# Patient Record
Sex: FEMALE | Race: WHITE | NOT HISPANIC OR LATINO | Employment: OTHER | URBAN - METROPOLITAN AREA
[De-identification: names, ages, dates, MRNs, and addresses within clinical notes are randomized per-mention and may not be internally consistent; named-entity substitution may affect disease eponyms.]

---
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
## 2023-08-31 PROBLEM — I51.89 IMPAIRED CARDIAC FUNCTION: Status: ACTIVE | Noted: 2023-08-31

## 2023-08-31 PROBLEM — R55 NEAR SYNCOPE: Status: ACTIVE | Noted: 2023-08-31

## 2023-08-31 PROBLEM — F03.90 DEMENTIA (MULTI): Status: ACTIVE | Noted: 2023-08-31

## 2023-08-31 PROBLEM — S09.90XA INJURY OF HEAD: Status: ACTIVE | Noted: 2023-08-31

## 2023-08-31 PROBLEM — I25.10 CORONARY ARTERY DISEASE: Status: ACTIVE | Noted: 2023-08-31

## 2023-08-31 PROBLEM — I10 ESSENTIAL HYPERTENSION: Status: ACTIVE | Noted: 2023-08-31

## 2023-08-31 PROBLEM — I10 HYPERTENSIVE DISORDER: Status: ACTIVE | Noted: 2023-08-31

## 2023-08-31 PROBLEM — R40.1 CLOUDED CONSCIOUSNESS: Status: ACTIVE | Noted: 2023-08-31

## 2023-08-31 PROBLEM — R53.1 ASTHENIA: Status: ACTIVE | Noted: 2023-08-31

## 2023-08-31 PROBLEM — R41.0 DELIRIUM: Status: ACTIVE | Noted: 2023-08-31

## 2023-08-31 PROBLEM — S01.511A LACERATION OF LOWER LIP: Status: ACTIVE | Noted: 2023-08-31

## 2023-08-31 PROBLEM — F41.9 ANXIETY: Status: ACTIVE | Noted: 2023-08-31

## 2023-08-31 PROBLEM — I49.9 CARDIAC RHYTHM DISORDER OR DISTURBANCE OR CHANGE: Status: ACTIVE | Noted: 2023-08-31

## 2023-08-31 PROBLEM — S49.90XA INJURY OF UPPER EXTREMITY: Status: ACTIVE | Noted: 2023-08-31

## 2023-08-31 PROBLEM — R42 LIGHTHEADEDNESS: Status: ACTIVE | Noted: 2023-08-31

## 2023-08-31 PROBLEM — S00.83XA CONTUSION OF FACE: Status: ACTIVE | Noted: 2023-08-31

## 2023-08-31 PROBLEM — R07.9 CHEST PAIN: Status: ACTIVE | Noted: 2023-08-31

## 2023-08-31 RX ORDER — METOPROLOL TARTRATE 25 MG/1
25 TABLET, FILM COATED ORAL DAILY
COMMUNITY

## 2023-08-31 RX ORDER — ATORVASTATIN CALCIUM 80 MG/1
1 TABLET, FILM COATED ORAL NIGHTLY
COMMUNITY

## 2023-08-31 RX ORDER — NAPROXEN SODIUM 220 MG/1
1 TABLET, FILM COATED ORAL 2 TIMES DAILY
COMMUNITY

## 2023-08-31 RX ORDER — LOSARTAN POTASSIUM 25 MG/1
1 TABLET ORAL DAILY
COMMUNITY

## 2023-10-10 ENCOUNTER — OFFICE VISIT (OUTPATIENT)
Dept: CARDIOLOGY | Facility: CLINIC | Age: 83
End: 2023-10-10
Payer: MEDICARE

## 2023-10-10 VITALS
HEIGHT: 62 IN | SYSTOLIC BLOOD PRESSURE: 148 MMHG | BODY MASS INDEX: 27.23 KG/M2 | HEART RATE: 57 BPM | TEMPERATURE: 98.6 F | DIASTOLIC BLOOD PRESSURE: 74 MMHG | WEIGHT: 148 LBS | RESPIRATION RATE: 18 BRPM | OXYGEN SATURATION: 98 %

## 2023-10-10 DIAGNOSIS — I25.118 CORONARY ARTERY DISEASE OF NATIVE ARTERY OF NATIVE HEART WITH STABLE ANGINA PECTORIS (CMS-HCC): Primary | ICD-10-CM

## 2023-10-10 DIAGNOSIS — I25.2 MI, OLD: ICD-10-CM

## 2023-10-10 DIAGNOSIS — I10 ESSENTIAL HYPERTENSION: ICD-10-CM

## 2023-10-10 PROCEDURE — 93000 ELECTROCARDIOGRAM COMPLETE: CPT | Performed by: INTERNAL MEDICINE

## 2023-10-10 PROCEDURE — 99213 OFFICE O/P EST LOW 20 MIN: CPT | Performed by: INTERNAL MEDICINE

## 2023-10-10 PROCEDURE — 1159F MED LIST DOCD IN RCRD: CPT | Performed by: INTERNAL MEDICINE

## 2023-10-10 PROCEDURE — 1126F AMNT PAIN NOTED NONE PRSNT: CPT | Performed by: INTERNAL MEDICINE

## 2023-10-10 PROCEDURE — 3078F DIAST BP <80 MM HG: CPT | Performed by: INTERNAL MEDICINE

## 2023-10-10 PROCEDURE — 3077F SYST BP >= 140 MM HG: CPT | Performed by: INTERNAL MEDICINE

## 2023-10-10 RX ORDER — MEMANTINE HYDROCHLORIDE 10 MG/1
5 TABLET ORAL DAILY
COMMUNITY
Start: 2023-08-12

## 2023-10-10 RX ORDER — DILTIAZEM HYDROCHLORIDE 240 MG/1
240 CAPSULE, COATED, EXTENDED RELEASE ORAL DAILY
COMMUNITY
Start: 2023-08-23 | End: 2023-12-22 | Stop reason: HOSPADM

## 2023-10-10 RX ORDER — DONEPEZIL HYDROCHLORIDE 10 MG/1
10 TABLET, FILM COATED ORAL NIGHTLY
COMMUNITY
Start: 2023-08-01

## 2023-10-10 ASSESSMENT — PATIENT HEALTH QUESTIONNAIRE - PHQ9
SUM OF ALL RESPONSES TO PHQ9 QUESTIONS 1 AND 2: 0
2. FEELING DOWN, DEPRESSED OR HOPELESS: NOT AT ALL
1. LITTLE INTEREST OR PLEASURE IN DOING THINGS: NOT AT ALL

## 2023-10-10 ASSESSMENT — ENCOUNTER SYMPTOMS
DEPRESSION: 0
OCCASIONAL FEELINGS OF UNSTEADINESS: 1
LOSS OF SENSATION IN FEET: 0

## 2023-10-10 ASSESSMENT — PAIN SCALES - GENERAL: PAINLEVEL: 0-NO PAIN

## 2023-10-10 NOTE — PROGRESS NOTES
"Subjective   Patient ID: Riya Hernandez is a 83 y.o. female who presents for Follow-up (Mrs\ Ms. Hernandez is present for 6 month  Follow up with Dr. Bright ).  HPI  83 the patient apparently is limited functionality underlying multiple cardiac risk factor, but condition  History of dementia, history of CAD, in the month of March of this year patient had a acute inferoposterior MI status post PCI of left circumflex artery was done.  Patient had a PCI of her mid circumflex with a drug-eluting stent so far stable currently patient on wheelchair-bound.  Moderate lesions in LAD could manage medically at the moment.  Patient currently on aspirin, Lipitor 80 mg, diltiazem 2040 mg once a day, losartan 25 mg tablet, metoprolol Lopressor 25 mg tablet, Brilinta 90 mg 1 tablet p.o. twice daily.  Patient denies any chest pain tightness limited functionality.  Currently with family.  Review of Systems  Unremarkable      4/12/2023    12:00 AM 10/10/2023     9:52 AM   Vitals   Systolic 124 148   Diastolic 66 74   Heart Rate 50 57   Temp  37 °C (98.6 °F)   Resp 18 18   Height (in) 1.575 m (5' 2\") 1.575 m (5' 2\")   Weight (lb) 143 148   BMI 26.16 kg/m2 27.07 kg/m2   BSA (m2) 1.68 m2 1.71 m2   Visit Report  Report     \  Objective   Physical Exam  General Cardiology:  General Appearance: Alert, oriented and in no acute distress.  HEENT: extra ocular movements intact (EOMI), pupils equal,  round, reactive to light and accommodation (PERRLA).  Carotid Upstroke: no bruit, normal.  Jugular Venous Distention (JVD): flat.  Chest: normal.  Lungs: Clear to auscultation,   Heart Sounds: no S3 or S4, normal S1, S2, regular rate.  Murmur, Click, Gallop: Soft systolic murmur.  Abdomen: no hepatomegaly, no masses felt, soft.  Extremities: Trace leg edema.  Peripheral pulses: 2 plus bilateral.  NEUROLOGY Cranial nerves II-XII grossly intact.    Assessment/Plan        Patient has above underlying multiple cardiac risk factors history of CAD, MI, " hypertension hyperlipidemia with dementia and limited functionality currently wheelchair-bound.  Continue current guideline directed medical therapy post MI  Continue aspirin, beta-blocker, ACE inhibitor, dual antiplatelet therapy 1 year post PCI.  EKG shows sinus bradycardia 55 with a nonspecific ST-T changes  Advised patient for CHF diet to avoid SALTY 6/equals 1,000MG per DAY, Advised patient to watch out for diarrhea, dehydration and dizziness with CHF care plan. Advised patient for CHF diet to avoid SALTY 6/equals 1,000MG per DAY, Guideline directed medical therapy for CHF and CMP.

## 2023-12-19 ENCOUNTER — APPOINTMENT (OUTPATIENT)
Dept: RADIOLOGY | Facility: HOSPITAL | Age: 83
DRG: 554 | End: 2023-12-19
Payer: MEDICARE

## 2023-12-19 ENCOUNTER — HOSPITAL ENCOUNTER (INPATIENT)
Facility: HOSPITAL | Age: 83
LOS: 3 days | Discharge: NURSING, REHAB OR LONG-TERM CARE | DRG: 554 | End: 2023-12-22
Attending: EMERGENCY MEDICINE | Admitting: INTERNAL MEDICINE
Payer: MEDICARE

## 2023-12-19 DIAGNOSIS — M16.12 OSTEOARTHRITIS OF LEFT HIP, UNSPECIFIED OSTEOARTHRITIS TYPE: ICD-10-CM
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
## 2023-12-19 DIAGNOSIS — I10 ESSENTIAL HYPERTENSION: ICD-10-CM

## 2023-12-19 DIAGNOSIS — M25.552 LEFT HIP PAIN: ICD-10-CM

## 2023-12-19 DIAGNOSIS — R40.1 CLOUDED CONSCIOUSNESS: ICD-10-CM

## 2023-12-19 DIAGNOSIS — R26.2 INABILITY TO WALK: ICD-10-CM

## 2023-12-19 LAB
ALBUMIN SERPL-MCNC: 3.7 G/DL (ref 3.5–5)
ALP BLD-CCNC: 244 U/L (ref 35–125)
ALT SERPL-CCNC: 16 U/L (ref 5–40)
ANION GAP SERPL CALC-SCNC: 10 MMOL/L
APPEARANCE UR: ABNORMAL
AST SERPL-CCNC: 18 U/L (ref 5–40)
BASOPHILS # BLD AUTO: 0.03 X10*3/UL (ref 0–0.1)
BASOPHILS NFR BLD AUTO: 0.3 %
BILIRUB SERPL-MCNC: 0.3 MG/DL (ref 0.1–1.2)
BILIRUB UR STRIP.AUTO-MCNC: NEGATIVE MG/DL
BUN SERPL-MCNC: 20 MG/DL (ref 8–25)
CALCIUM SERPL-MCNC: 8.9 MG/DL (ref 8.5–10.4)
CHLORIDE SERPL-SCNC: 107 MMOL/L (ref 97–107)
CO2 SERPL-SCNC: 22 MMOL/L (ref 24–31)
COLOR UR: ABNORMAL
CREAT SERPL-MCNC: 1.1 MG/DL (ref 0.4–1.6)
EOSINOPHIL # BLD AUTO: 0.13 X10*3/UL (ref 0–0.4)
EOSINOPHIL NFR BLD AUTO: 1.3 %
ERYTHROCYTE [DISTWIDTH] IN BLOOD BY AUTOMATED COUNT: 17.2 % (ref 11.5–14.5)
FLUAV RNA RESP QL NAA+PROBE: NOT DETECTED
FLUBV RNA RESP QL NAA+PROBE: NOT DETECTED
GFR SERPL CREATININE-BSD FRML MDRD: 49 ML/MIN/1.73M*2
GLUCOSE SERPL-MCNC: 91 MG/DL (ref 65–99)
GLUCOSE UR STRIP.AUTO-MCNC: NORMAL MG/DL
HCT VFR BLD AUTO: 34.7 % (ref 36–46)
HGB BLD-MCNC: 11.1 G/DL (ref 12–16)
IMM GRANULOCYTES # BLD AUTO: 0.04 X10*3/UL (ref 0–0.5)
IMM GRANULOCYTES NFR BLD AUTO: 0.4 % (ref 0–0.9)
KETONES UR STRIP.AUTO-MCNC: NEGATIVE MG/DL
LEUKOCYTE ESTERASE UR QL STRIP.AUTO: NEGATIVE
LYMPHOCYTES # BLD AUTO: 1.4 X10*3/UL (ref 0.8–3)
LYMPHOCYTES NFR BLD AUTO: 14.5 %
MCH RBC QN AUTO: 27.5 PG (ref 26–34)
MCHC RBC AUTO-ENTMCNC: 32 G/DL (ref 32–36)
MCV RBC AUTO: 86 FL (ref 80–100)
MONOCYTES # BLD AUTO: 0.82 X10*3/UL (ref 0.05–0.8)
MONOCYTES NFR BLD AUTO: 8.5 %
NEUTROPHILS # BLD AUTO: 7.26 X10*3/UL (ref 1.6–5.5)
NEUTROPHILS NFR BLD AUTO: 75 %
NITRITE UR QL STRIP.AUTO: NEGATIVE
NRBC BLD-RTO: 0 /100 WBCS (ref 0–0)
PH UR STRIP.AUTO: 5.5 [PH]
PLATELET # BLD AUTO: 344 X10*3/UL (ref 150–450)
POTASSIUM SERPL-SCNC: 4.2 MMOL/L (ref 3.4–5.1)
PROT SERPL-MCNC: 7.1 G/DL (ref 5.9–7.9)
PROT UR STRIP.AUTO-MCNC: NEGATIVE MG/DL
RBC # BLD AUTO: 4.03 X10*6/UL (ref 4–5.2)
RBC # UR STRIP.AUTO: NEGATIVE /UL
SARS-COV-2 RNA RESP QL NAA+PROBE: NOT DETECTED
SODIUM SERPL-SCNC: 139 MMOL/L (ref 133–145)
SP GR UR STRIP.AUTO: 1.01
UROBILINOGEN UR STRIP.AUTO-MCNC: NORMAL MG/DL
WBC # BLD AUTO: 9.7 X10*3/UL (ref 4.4–11.3)

## 2023-12-19 PROCEDURE — 93010 ELECTROCARDIOGRAM REPORT: CPT | Performed by: INTERNAL MEDICINE

## 2023-12-19 PROCEDURE — 96372 THER/PROPH/DIAG INJ SC/IM: CPT | Performed by: INTERNAL MEDICINE

## 2023-12-19 PROCEDURE — 2500000001 HC RX 250 WO HCPCS SELF ADMINISTERED DRUGS (ALT 637 FOR MEDICARE OP): Performed by: CLINICAL NURSE SPECIALIST

## 2023-12-19 PROCEDURE — 1100000001 HC PRIVATE ROOM DAILY

## 2023-12-19 PROCEDURE — 85025 COMPLETE CBC W/AUTO DIFF WBC: CPT | Performed by: CLINICAL NURSE SPECIALIST

## 2023-12-19 PROCEDURE — 99285 EMERGENCY DEPT VISIT HI MDM: CPT | Performed by: EMERGENCY MEDICINE

## 2023-12-19 PROCEDURE — 2500000004 HC RX 250 GENERAL PHARMACY W/ HCPCS (ALT 636 FOR OP/ED): Performed by: INTERNAL MEDICINE

## 2023-12-19 PROCEDURE — 73700 CT LOWER EXTREMITY W/O DYE: CPT | Mod: LT

## 2023-12-19 PROCEDURE — 71046 X-RAY EXAM CHEST 2 VIEWS: CPT | Mod: FY

## 2023-12-19 PROCEDURE — 36415 COLL VENOUS BLD VENIPUNCTURE: CPT | Performed by: CLINICAL NURSE SPECIALIST

## 2023-12-19 PROCEDURE — 80053 COMPREHEN METABOLIC PANEL: CPT | Performed by: CLINICAL NURSE SPECIALIST

## 2023-12-19 PROCEDURE — 87636 SARSCOV2 & INF A&B AMP PRB: CPT | Performed by: CLINICAL NURSE SPECIALIST

## 2023-12-19 PROCEDURE — 73502 X-RAY EXAM HIP UNI 2-3 VIEWS: CPT | Mod: LT,FY

## 2023-12-19 PROCEDURE — 81003 URINALYSIS AUTO W/O SCOPE: CPT | Performed by: CLINICAL NURSE SPECIALIST

## 2023-12-19 RX ORDER — DILTIAZEM HYDROCHLORIDE 180 MG/1
180 CAPSULE, COATED, EXTENDED RELEASE ORAL DAILY
Status: DISCONTINUED | OUTPATIENT
Start: 2023-12-19 | End: 2023-12-22 | Stop reason: HOSPADM

## 2023-12-19 RX ORDER — HYDROCODONE BITARTRATE AND ACETAMINOPHEN 5; 325 MG/1; MG/1
1 TABLET ORAL EVERY 6 HOURS PRN
Status: DISCONTINUED | OUTPATIENT
Start: 2023-12-19 | End: 2023-12-22 | Stop reason: HOSPADM

## 2023-12-19 RX ORDER — NAPROXEN SODIUM 220 MG/1
81 TABLET, FILM COATED ORAL DAILY
Status: DISCONTINUED | OUTPATIENT
Start: 2023-12-19 | End: 2023-12-19

## 2023-12-19 RX ORDER — ACETAMINOPHEN 325 MG/1
650 TABLET ORAL EVERY 4 HOURS PRN
Status: DISCONTINUED | OUTPATIENT
Start: 2023-12-19 | End: 2023-12-22 | Stop reason: HOSPADM

## 2023-12-19 RX ORDER — DONEPEZIL HYDROCHLORIDE 10 MG/1
10 TABLET, FILM COATED ORAL NIGHTLY
Status: DISCONTINUED | OUTPATIENT
Start: 2023-12-19 | End: 2023-12-22 | Stop reason: HOSPADM

## 2023-12-19 RX ORDER — ATORVASTATIN CALCIUM 80 MG/1
80 TABLET, FILM COATED ORAL NIGHTLY
Status: DISCONTINUED | OUTPATIENT
Start: 2023-12-19 | End: 2023-12-22 | Stop reason: HOSPADM

## 2023-12-19 RX ORDER — PANTOPRAZOLE SODIUM 40 MG/1
40 TABLET, DELAYED RELEASE ORAL
Status: DISCONTINUED | OUTPATIENT
Start: 2023-12-20 | End: 2023-12-22 | Stop reason: HOSPADM

## 2023-12-19 RX ORDER — LORAZEPAM 0.5 MG/1
0.5 TABLET ORAL ONCE
Status: COMPLETED | OUTPATIENT
Start: 2023-12-19 | End: 2023-12-19

## 2023-12-19 RX ORDER — PANTOPRAZOLE SODIUM 40 MG/10ML
40 INJECTION, POWDER, LYOPHILIZED, FOR SOLUTION INTRAVENOUS
Status: DISCONTINUED | OUTPATIENT
Start: 2023-12-20 | End: 2023-12-22 | Stop reason: HOSPADM

## 2023-12-19 RX ORDER — NAPROXEN SODIUM 220 MG/1
81 TABLET, FILM COATED ORAL 2 TIMES DAILY
Status: DISCONTINUED | OUTPATIENT
Start: 2023-12-20 | End: 2023-12-22 | Stop reason: HOSPADM

## 2023-12-19 RX ORDER — ACETAMINOPHEN 160 MG/5ML
650 SOLUTION ORAL EVERY 4 HOURS PRN
Status: DISCONTINUED | OUTPATIENT
Start: 2023-12-19 | End: 2023-12-22 | Stop reason: HOSPADM

## 2023-12-19 RX ORDER — METOPROLOL TARTRATE 25 MG/1
25 TABLET, FILM COATED ORAL DAILY
Status: DISCONTINUED | OUTPATIENT
Start: 2023-12-19 | End: 2023-12-22 | Stop reason: HOSPADM

## 2023-12-19 RX ORDER — BISACODYL 5 MG
10 TABLET, DELAYED RELEASE (ENTERIC COATED) ORAL DAILY PRN
Status: DISCONTINUED | OUTPATIENT
Start: 2023-12-19 | End: 2023-12-22 | Stop reason: HOSPADM

## 2023-12-19 RX ORDER — LIDOCAINE 560 MG/1
1 PATCH PERCUTANEOUS; TOPICAL; TRANSDERMAL DAILY
Status: DISCONTINUED | OUTPATIENT
Start: 2023-12-19 | End: 2023-12-22 | Stop reason: HOSPADM

## 2023-12-19 RX ORDER — MEMANTINE HYDROCHLORIDE 5 MG/1
5 TABLET ORAL DAILY
Status: DISCONTINUED | OUTPATIENT
Start: 2023-12-19 | End: 2023-12-22 | Stop reason: HOSPADM

## 2023-12-19 RX ORDER — LOSARTAN POTASSIUM 25 MG/1
25 TABLET ORAL DAILY
Status: DISCONTINUED | OUTPATIENT
Start: 2023-12-19 | End: 2023-12-22 | Stop reason: HOSPADM

## 2023-12-19 RX ORDER — ACETAMINOPHEN 650 MG/1
650 SUPPOSITORY RECTAL EVERY 4 HOURS PRN
Status: DISCONTINUED | OUTPATIENT
Start: 2023-12-19 | End: 2023-12-22 | Stop reason: HOSPADM

## 2023-12-19 RX ORDER — ENOXAPARIN SODIUM 100 MG/ML
40 INJECTION SUBCUTANEOUS EVERY 24 HOURS
Status: DISCONTINUED | OUTPATIENT
Start: 2023-12-19 | End: 2023-12-22 | Stop reason: HOSPADM

## 2023-12-19 RX ADMIN — LORAZEPAM 0.5 MG: 0.5 TABLET ORAL at 16:45

## 2023-12-19 RX ADMIN — ENOXAPARIN SODIUM 40 MG: 40 INJECTION SUBCUTANEOUS at 20:59

## 2023-12-19 SDOH — SOCIAL STABILITY: SOCIAL INSECURITY: WERE YOU ABLE TO COMPLETE ALL THE BEHAVIORAL HEALTH SCREENINGS?: YES

## 2023-12-19 SDOH — SOCIAL STABILITY: SOCIAL INSECURITY: ARE THERE ANY APPARENT SIGNS OF INJURIES/BEHAVIORS THAT COULD BE RELATED TO ABUSE/NEGLECT?: NO

## 2023-12-19 SDOH — SOCIAL STABILITY: SOCIAL INSECURITY: HAVE YOU HAD THOUGHTS OF HARMING ANYONE ELSE?: NO

## 2023-12-19 SDOH — SOCIAL STABILITY: SOCIAL INSECURITY: HAS ANYONE EVER THREATENED TO HURT YOUR FAMILY OR YOUR PETS?: NO

## 2023-12-19 SDOH — SOCIAL STABILITY: SOCIAL INSECURITY: ARE YOU OR HAVE YOU BEEN THREATENED OR ABUSED PHYSICALLY, EMOTIONALLY, OR SEXUALLY BY ANYONE?: NO

## 2023-12-19 SDOH — SOCIAL STABILITY: SOCIAL INSECURITY: DO YOU FEEL UNSAFE GOING BACK TO THE PLACE WHERE YOU ARE LIVING?: NO

## 2023-12-19 SDOH — SOCIAL STABILITY: SOCIAL INSECURITY: DOES ANYONE TRY TO KEEP YOU FROM HAVING/CONTACTING OTHER FRIENDS OR DOING THINGS OUTSIDE YOUR HOME?: NO

## 2023-12-19 SDOH — SOCIAL STABILITY: SOCIAL INSECURITY: ABUSE: ADULT

## 2023-12-19 SDOH — SOCIAL STABILITY: SOCIAL INSECURITY: DO YOU FEEL ANYONE HAS EXPLOITED OR TAKEN ADVANTAGE OF YOU FINANCIALLY OR OF YOUR PERSONAL PROPERTY?: NO

## 2023-12-19 ASSESSMENT — PAIN SCALES - GENERAL
PAINLEVEL_OUTOF10: 0 - NO PAIN

## 2023-12-19 ASSESSMENT — ACTIVITIES OF DAILY LIVING (ADL)
ADEQUATE_TO_COMPLETE_ADL: YES
WALKS IN HOME: INDEPENDENT
BATHING: INDEPENDENT
FEEDING YOURSELF: INDEPENDENT
ASSISTIVE_DEVICE: WALKER
HEARING - RIGHT EAR: FUNCTIONAL
PATIENT'S MEMORY ADEQUATE TO SAFELY COMPLETE DAILY ACTIVITIES?: YES
DRESSING YOURSELF: INDEPENDENT
TOILETING: INDEPENDENT
HEARING - LEFT EAR: FUNCTIONAL
GROOMING: INDEPENDENT
LACK_OF_TRANSPORTATION: NO
JUDGMENT_ADEQUATE_SAFELY_COMPLETE_DAILY_ACTIVITIES: YES

## 2023-12-19 ASSESSMENT — LIFESTYLE VARIABLES
SKIP TO QUESTIONS 9-10: 1
AUDIT-C TOTAL SCORE: 0
HOW MANY STANDARD DRINKS CONTAINING ALCOHOL DO YOU HAVE ON A TYPICAL DAY: PATIENT DOES NOT DRINK
HOW OFTEN DO YOU HAVE 6 OR MORE DRINKS ON ONE OCCASION: NEVER
HOW OFTEN DO YOU HAVE A DRINK CONTAINING ALCOHOL: NEVER
AUDIT-C TOTAL SCORE: 0

## 2023-12-19 ASSESSMENT — COGNITIVE AND FUNCTIONAL STATUS - GENERAL
PATIENT BASELINE BEDBOUND: NO
STANDING UP FROM CHAIR USING ARMS: A LITTLE
MOBILITY SCORE: 23
WALKING IN HOSPITAL ROOM: A LITTLE
CLIMB 3 TO 5 STEPS WITH RAILING: A LITTLE
DRESSING REGULAR LOWER BODY CLOTHING: A LITTLE
TOILETING: A LITTLE
DAILY ACTIVITIY SCORE: 23

## 2023-12-19 ASSESSMENT — PATIENT HEALTH QUESTIONNAIRE - PHQ9
2. FEELING DOWN, DEPRESSED OR HOPELESS: NOT AT ALL
SUM OF ALL RESPONSES TO PHQ9 QUESTIONS 1 & 2: 0
1. LITTLE INTEREST OR PLEASURE IN DOING THINGS: NOT AT ALL

## 2023-12-19 ASSESSMENT — COLUMBIA-SUICIDE SEVERITY RATING SCALE - C-SSRS
2. HAVE YOU ACTUALLY HAD ANY THOUGHTS OF KILLING YOURSELF?: NO
6. HAVE YOU EVER DONE ANYTHING, STARTED TO DO ANYTHING, OR PREPARED TO DO ANYTHING TO END YOUR LIFE?: NO
1. IN THE PAST MONTH, HAVE YOU WISHED YOU WERE DEAD OR WISHED YOU COULD GO TO SLEEP AND NOT WAKE UP?: NO

## 2023-12-19 ASSESSMENT — PAIN - FUNCTIONAL ASSESSMENT
PAIN_FUNCTIONAL_ASSESSMENT: FLACC (FACE, LEGS, ACTIVITY, CRY, CONSOLABILITY)
PAIN_FUNCTIONAL_ASSESSMENT: 0-10

## 2023-12-19 NOTE — ED PROVIDER NOTES
Department of Emergency Medicine   ED  Provider Note  Admit Date/RoomTime: 12/19/2023 12:42 PM  ED Room: ST25/ST25        History of Present Illness:  Chief Complaint   Patient presents with    Fall     Fall, left hip pain now         Riya Hernandez is a 83 y.o. female history of Alzheimer's, hypertension coronary artery disease, hyperlipidemia, presenting to the ED for fall with left hip pain.  Family believes that she may have fallen last week.  Was evaluated seen to have no pain she is unsure if she fell again today both falls were unwitnessed.  But today has not been able to bear weight on her left leg.  She complains of pain in her left hip.  Denies chest pain shortness of breath no nausea vomiting or dizziness.    Review of Systems:   Pertinent positives and negatives are stated within HPI, all other systems reviewed and are negative.        --------------------------------------------- PAST HISTORY ---------------------------------------------  Past Medical History:  has a past medical history of Alzheimer disease (CMS/HCC), Anxiety, Arrhythmia, Chest pain, Coronary artery disease, Delirium, Delirium, Dementia (CMS/HCC), Hyperlipidemia, Hypertension, Lightheadedness, Myocardial infarction (CMS/HCC), and Near syncope.  Past Surgical History:  has a past surgical history that includes Cardiac catheterization and Coronary stent placement.  Social History:  reports that she has been smoking cigarettes. She has a 2.50 pack-year smoking history. She has never been exposed to tobacco smoke. She has never used smokeless tobacco. She reports that she does not drink alcohol and does not use drugs.  Family History: family history includes Heart disease in her brother and mother; Sudden death in her brother; triple bypass in her mother.. Unless otherwise noted, family history is non contributory  The patient’s home medications have been reviewed.  Allergies: Patient has no known  "allergies.        ---------------------------------------------------PHYSICAL EXAM--------------------------------------    GENERAL APPEARANCE: Awake and alert.   VITAL SIGNS: As per the nurses' triage record.   HEENT: Normocephalic, atraumatic.  No raccoon eyes or montesinos signs noted.  No epistaxis noted no bite to the tongue or lip.  Extraocular muscles are intact. Pupils equal round and reactive to light. Conjunctiva are pink. Negative scleral icterus. Mucous membranes are moist. Tongue in the midline. Pharynx was without erythema or exudates, uvula midline  NECK: Soft Nontender and supple, full gross ROM, no meningeal signs.  No pain palpation cervical spine no step-offs crepitus or bruising  CHEST: Nontender to palpation. Clear to auscultation bilaterally. No rales, rhonchi, or wheezing.   HEART: S1, S2. Regular rate and rhythm. No murmurs, gallops or rubs.  Strong and equal pulses in the extremities.   ABDOMEN: Soft, nontender, nondistended, positive bowel sounds, no palpable masses.  MUSCULCSKELETAL: Left lower extremity: Able to straight leg with no difficulty.  Pain with palpation over the left buttocks no bruising no rashes lesions or sores noted.  Peripheral pulses intact.  Moving all extremities no difficulty no bruising or edema noted.  NEUROLOGICAL: Awake, alert and oriented x 3. Power intact in the upper and lower extremities. Sensation is intact to light touch in the upper and lower extremities.   IMMUNOLOGICAL: No lymphatic streaking noted   DERM: No petechiae, rashes, or ecchymoses.          ------------------------- NURSING NOTES AND VITALS REVIEWED ---------------------------  The nursing notes within the ED encounter and vital signs as below have been reviewed by myself  /63   Pulse 76   Temp 36.5 °C (97.7 °F) (Oral)   Resp 16   Ht 1.6 m (5' 3\")   Wt 62.6 kg (138 lb)   SpO2 97%   BMI 24.45 kg/m²     Oxygen Saturation Interpretation: Normal      The patient’s available past medical " records and past encounters were reviewed.          -----------------------DIAGNOSTIC RESULTS------------------------  LABS:    Labs Reviewed   CBC WITH AUTO DIFFERENTIAL - Abnormal       Result Value    WBC 9.7      nRBC 0.0      RBC 4.03      Hemoglobin 11.1 (*)     Hematocrit 34.7 (*)     MCV 86      MCH 27.5      MCHC 32.0      RDW 17.2 (*)     Platelets 344      Neutrophils % 75.0      Immature Granulocytes %, Automated 0.4      Lymphocytes % 14.5      Monocytes % 8.5      Eosinophils % 1.3      Basophils % 0.3      Neutrophils Absolute 7.26 (*)     Immature Granulocytes Absolute, Automated 0.04      Lymphocytes Absolute 1.40      Monocytes Absolute 0.82 (*)     Eosinophils Absolute 0.13      Basophils Absolute 0.03     COMPREHENSIVE METABOLIC PANEL - Abnormal    Glucose 91      Sodium 139      Potassium 4.2      Chloride 107      Bicarbonate 22 (*)     Urea Nitrogen 20      Creatinine 1.10      eGFR 49 (*)     Calcium 8.9      Albumin 3.7      Alkaline Phosphatase 244 (*)     Total Protein 7.1      AST 18      Bilirubin, Total 0.3      ALT 16      Anion Gap 10     URINALYSIS WITH REFLEX MICROSCOPIC - Abnormal    Color, Urine Light-Yellow      Appearance, Urine Turbid (*)     Specific Gravity, Urine 1.014      pH, Urine 5.5      Protein, Urine NEGATIVE      Glucose, Urine Normal      Blood, Urine NEGATIVE      Ketones, Urine NEGATIVE      Bilirubin, Urine NEGATIVE      Urobilinogen, Urine Normal      Nitrite, Urine NEGATIVE      Leukocyte Esterase, Urine NEGATIVE     SARS-COV-2 PCR, SYMPTOMATIC - Normal    Coronavirus 2019, PCR Not Detected      Narrative:     This assay has received FDA Emergency Use Authorization (EUA) and is only authorized for the duration of time that circumstances exist to justify the authorization of the emergency use of in vitro diagnostic tests for the detection of SARS-CoV-2 virus and/or diagnosis of COVID-19 infection under section 564(b)(1) of the Act, 21 U.S.C. 360bbb-3(b)(1). This  assay is an in vitro diagnostic nucleic acid amplification test for the qualitative detection of SARS-CoV-2 from nasopharyngeal specimens and has been validated for use at [facility]. Negative results do not preclude COVID-19 infections and should not be used as the sole basis for diagnosis, treatment, or other management decisions.     INFLUENZA A AND B PCR - Normal    Flu A Result Not Detected      Flu B Result Not Detected      Narrative:     This assay is an in vitro diagnostic multiplex nucleic acid amplification test for the detection and discrimination of Influenza A & B from nasopharyngeal specimens, and has been validated for use at [facility]. Negative results do not preclude Influenza A/B infections, and should not be used as the sole basis for diagnosis, treatment, or other management decisions. If Influenza A/B and RSV PCR results are negative, testing for Parainfluenza virus, Adenovirus and Metapneumovirus is routinely performed for [facility] [place] oncology and intensive care inpatients, and is available on other patients by placing an add-on request.       As interpreted by me, the above displayed labs are abnormal. All other labs obtained during this visit were within normal range or not returned as of this dictation.      EKG Interpretation  EKG per attending note no ST elevation or arrhythmia noted. Dr. Pickering        XR chest 2 views   Final Result   No acute cardiopulmonary process.             Signed by: Waldo Waldrop 12/19/2023 3:25 PM   Dictation workstation:   [id number]      CT hip left wo IV contrast   Final Result   No evidence for acute fracture or dislocation. Moderate degenerative   change of the left hip joint.        Signed by: Waldo Waldrop 12/19/2023 4:29 PM   Dictation workstation:   [id number]      XR hip left with pelvis when performed 2 or 3 views   Final Result   No evidence for acute fracture or dislocation.         Mild-moderate degenerative change of the hip joints.        Signed by: Waldo Waldrop 12/19/2023 2:13 PM   Dictation workstation:   [id number]              XR chest 2 views   Final Result   No acute cardiopulmonary process.             Signed by: Waldo Waldrop 12/19/2023 3:25 PM   Dictation workstation:   [id number]      CT hip left wo IV contrast   Final Result   No evidence for acute fracture or dislocation. Moderate degenerative   change of the left hip joint.        Signed by: Waldo Waldrop 12/19/2023 4:29 PM   Dictation workstation:   [id number]      XR hip left with pelvis when performed 2 or 3 views   Final Result   No evidence for acute fracture or dislocation.        Mild-moderate degenerative change of the hip joints.        Signed by: Waldo Waldrop 12/19/2023 2:13 PM   Dictation workstation:   [id number]              ------------------------------ ED COURSE/MEDICAL DECISION MAKING----------------------  Medical Decision Making:   Exam: A medically appropriate exam performed, outlined above, given the known history and presentation.    History obtained from: Review of medical record nursing notes patient's family patient is a poor historian secondary to her Alzheimer's dementia.      Social Determinants of Health considered during this visit: lives at home with family      PAST MEDICAL HISTORY/Chronic Conditions Affecting Care     has a past medical history of Alzheimer disease (CMS/HCC), Anxiety, Arrhythmia, Chest pain, Coronary artery disease, Delirium, Delirium, Dementia (CMS/HCC), Hyperlipidemia, Hypertension, Lightheadedness, Myocardial infarction (CMS/HCC), and Near syncope.       CC/HPI Summary, Social Determinants of health, Records Reviewed, DDx, testing done/not done, ED Course, Reassessment, disposition considerations/shared decision making with patient, consults, disposition:   Presents with left hip pain unsure if she had a second fall but not unable to bear  weight  Plan  X-ray left hip-No evidence for acute fracture or dislocation.    Mild-moderate degenerative change of the hip joints.  X-ray negative.  Try to ambulate patient unable to ambulate due to severe pain in the left hip with left leg appearing to give out on patient.  CT of the hip ordered as well as additional testing with plan for admission due to inability to ambulate  Chest x-ray-No acute cardiopulmonary process.   CT left hip-No evidence for acute fracture or dislocation. Moderate degenerative  change of the left hip joint.      EKG  CBC  COVID   flu   CMP  Urine    Medical Decision Making/Differential Diagnosis:  Differentials include not limited to fracture versus contusion versus sprain strain.  Versus arthritic changes  COVID-negative  Flu negative  Glucose 91  Electrolytes within normal limits  BUN 20 with creatinine 1.1  Bicarb 22  LFTs within normal meds  Alkaline phosphatase 244  Urine is not consistent with UTI  White blood cell count 9.7  Hemoglobin 11.1 no signs of bleeding    83-year-old female with significant history of Alzheimer's dementia resides with her daughter presents with fall with worsening pain after she had a second fall today causing the pain to increase.  Now unable to bear weight.  Attempted to ambulate the patient myself patient is unable to bear weight on the left leg.  X-ray showed no evidence of acute fracture or dislocation mild to moderate degenerative changes of the hip joints.  CT of the hip was ordered.  Reviewed CT with orthopedic surgeon Dr. Funes.  Due to patient's inability to ambulate recommend admission for PT OT for possible rehab placement.  CT of the hip showed no evidence of acute fracture or dislocation moderate degenerative changes of the left hip joint.  Case was discussed with hospitalist who agreed to admit the patient under his service.  Basic lab work ordered for admission COVID-negative flu negative electrolytes within normal limits normal renal  function mild electrolyte imbalance noted.  Alkaline phosphatase elevated otherwise LFT unremarkables.  Anemia noted with no signs of active bleeding.  No elevation white blood cell count indicate infection.  Urine is not consistent with UTI.  EKG showed no arrhythmia or ST elevation.  Chest x-ray showed no acute cardiopulmonary process.  Patient was seen and evaluated attending physician Dr. Pickering   Discussed test results with patient and family family feels patient not safe to go home.  She is unable to ambulate.  Reports patient does get agitated at times has had Ativan in the past with improvement.  1 dose of Ativan provided in the emergency department.  PROCEDURES  Unless otherwise noted below, none      CONSULTS:   None      ED Course as of 12/19/23 1713 Tue Dec 19, 2023   1442 Attempted to ambulate patient.  Patient was unable to take a full step without the left leg giving out on her.  With complaints of pain at the hip. [TB]   1619 Reviewed case with orthopedic surgeon Dr. Covington.  Discussed patient's CT with him.  Reviewed the CT did not see an obvious fracture but patient is unable to ambulate would recommend admission to the hospital possible PT OT rehab and he would see the patient in consult believe that her pain is mostly related to arthritic changes.  Unless radiology was able to determine a unseen fracture [TB]   1642 Discussed case with Dr. Reese hospitalist agreed to admit the patient under his service for further evaluation and treatment [TB]      ED Course User Index  [TB] Keiko Nguyen, APRN-CNP         Diagnoses as of 12/19/23 1713   Left hip pain   Osteoarthritis of left hip, unspecified osteoarthritis type   Inability to walk         This patient has remained hemodynamically stable during their ED course.      Critical Care: none       Counseling:  The emergency provider has spoken with the patient and family and discussed today’s results, in addition to providing specific details  for the plan of care and counseling regarding the diagnosis and prognosis.  Questions are answered at this time and they are agreeable with the plan.         --------------------------------- IMPRESSION AND DISPOSITION ---------------------------------    IMPRESSION  1. Left hip pain    2. Osteoarthritis of left hip, unspecified osteoarthritis type    3. Inability to walk        DISPOSITION  Disposition: Admit hospitalist service  Patient condition is stable        NOTE: This report was transcribed using voice recognition software. Every effort was made to ensure accuracy; however, inadvertent computerized transcription errors may be present      Keiko Nguyen, MATHEUS-CNP  12/19/23 [id number]

## 2023-12-19 NOTE — CARE PLAN
Problem: Pain  Goal: My pain/discomfort is manageable  Outcome: Progressing     Problem: Safety  Goal: Patient will be injury free during hospitalization  Outcome: Progressing  Goal: I will remain free of falls  Outcome: Progressing     Problem: Daily Care  Goal: Daily care needs are met  Outcome: Progressing     Problem: Psychosocial Needs  Goal: Demonstrates ability to cope with hospitalization/illness  Outcome: Progressing  Goal: Collaborate with me, my family, and caregiver to identify my specific goals  Outcome: Progressing  Flowsheets (Taken 12/19/2023 3710)  Cultural Requests During Hospitalization: denies  Spiritual Requests During Hospitalization: denies     Problem: Discharge Barriers  Goal: My discharge needs are met  Outcome: Progressing     Problem: Skin  Goal: Decreased wound size/increased tissue granulation at next dressing change  Outcome: Progressing  Goal: Participates in plan/prevention/treatment measures  Outcome: Progressing  Goal: Prevent/manage excess moisture  Outcome: Progressing  Goal: Prevent/minimize sheer/friction injuries  Outcome: Progressing  Goal: Promote/optimize nutrition  Outcome: Progressing  Goal: Promote skin healing  Outcome: Progressing     Problem: Fall/Injury  Goal: Not fall by end of shift  Outcome: Progressing  Goal: Be free from injury by end of the shift  Outcome: Progressing  Goal: Verbalize understanding of personal risk factors for fall in the hospital  Outcome: Progressing  Goal: Verbalize understanding of risk factor reduction measures to prevent injury from fall in the home  Outcome: Progressing  Goal: Use assistive devices by end of the shift  Outcome: Progressing  Goal: Pace activities to prevent fatigue by end of the shift  Outcome: Progressing   The patient's goals for the shift include      The clinical goals for the shift include saftey and comfort    Over the shift, the patient did not make progress toward the following goals. Barriers to progression  include weakness. Recommendations to address these barriers include PT/OT eval, safe ambulation.

## 2023-12-19 NOTE — H&P
"[facility] MEDICINE   HISTORY AND PHYSICAL EXAMINATION       NAME: Riya Hernandez MR#: [id number]   ATTENDING: Jitendra Engle  [facility]#: [id number]   SEX: female ROOM: [facility]   : 1940   ADMIT DATE: 2023      Chief Complaint:     \"My left hip hurts\"  History of Present Illness:   Riya Hernandez is a 83 y.o. year-old female with multiple medical conditions who presents to the ED on 2023 for worsening left hip pain. Family believes that she may have fallen last week.  Poor historian and she is unsure if she fell again today both falls were unwitnessed.  Patient denies any head trauma, palpitations or loss of consciousness during any of those falls but today has not been able to bear weight on her left leg.  She complains of pain in her left hip.  Denies chest pain shortness of breath no nausea vomiting or dizziness.  In the ED, imaging studies including CT of the leg showed no evidence of acute fractures or dislocations but patient was unable to ambulate when ER staff tried walking the patient. case with orthopedic surgeon Dr. Covington.  CT did not see an obvious fracture but patient is unable to ambulate would recommend admission to the hospital possible PT OT rehab and he would see the patient in consult believe that her pain is mostly related to arthritic changes.     The patient per the records has had no previous diagnosis of cancer, CVA, seizures, lupus, diabetes, hepatitis, tuberculosis, rheumatic fever, DVTs, PEs, thyroid, kidney conditions or sleep apnea in the past. The patient prior to admission was taking multiple medications for hypertension, hyperlipidemia, CAD, Alzheimer's dementia and osteoarthritis.  Per the daughter, patient has had cardiac stents in the past and is on aspirin as well as Brilinta. By the time that I rounded, the patient on RA, reports \"left hip still hurts\" but denies any CP/N/V/D/F/C/abdominal pain.  Medications:     No " current facility-administered medications on file prior to encounter.     Current Outpatient Medications on File Prior to Encounter   Medication Sig Dispense Refill    aspirin 81 mg chewable tablet Chew 1 tablet (81 mg) once daily. TAKE WITH BRILINTA Oral for 90      atorvastatin (Lipitor) 80 mg tablet Take 1 tablet (80 mg) by mouth once daily at bedtime. For 90      dilTIAZem CD (Cardizem CD) 240 mg 24 hr capsule Take 1 capsule (240 mg) by mouth once daily.      donepezil (Aricept) 10 mg tablet Take 1 tablet (10 mg) by mouth once daily at bedtime.      losartan (Cozaar) 25 mg tablet Take 1 tablet (25 mg) by mouth once daily. For 90      memantine (Namenda) 10 mg tablet Take 0.5 tablets (5 mg) by mouth once daily.      metoprolol tartrate (Lopressor) 25 mg tablet Take 1 tablet (25 mg) by mouth once daily. For 90      ticagrelor (Brilinta) 90 mg tablet Take 1 tablet (90 mg) by mouth 2 times a day. For 90       Allergies:   Patient has no known allergies.    Past Medical History:      She has a past medical history of Alzheimer disease (CMS/[facility]), Anxiety, Arrhythmia, Chest pain, Coronary artery disease, Delirium, Delirium, Dementia (CMS/H[facility]), Hyperlipidemia, Hypertension, Lightheadedness, Myocardial infarction (CMS/[facility]), and Near syncope.    Surgical History  She has a past surgical history that includes Cardiac catheterization and Coronary stent placement.    Social History:   She reports that she has been smoking cigarettes. She has a 2.50 pack-year smoking history. She has never been exposed to tobacco smoke. She has never used smokeless tobacco. She reports that she does not drink alcohol and does not use drugs.    Pt did is a wheelchair prior to their hospitalization and lives with her daughter as well as son-in-law.    Family History:     Family History   Problem Relation Name Age of Onset    Heart disease Mother      Other (triple bypass) Mother      Heart disease Brother      Sudden death Brother           "cardiac death     Mother  at age 81 from \"heart problems\".  Father  age 68 from colon cancer.    Review of systems:     All other systems reviewed and negative unless stated in the history of present illness.    Physical Exam:   Blood pressure 152/63, pulse 76, temperature 36.5 °C (97.7 °F), temperature source Oral, resp. rate 16, height 1.6 m (5' 3\"), weight 62.6 kg (138 lb), SpO2 97 %.  GENERAL: A  female who is  A&O x 2-3 and is following commands but confused at times.  HEENT: Normocephalic, atraumatic.  Pupils 2-4 mm OU.  Oral mucosa pink and moist without ulceration.   NECK: Supple, trachea is midline without bruits.   CARDIOVASCULAR: Regular rate and rhythm. S1, S2. No obvious S3, S4.   PULMONARY: Decreased breath sounds at the bases without rales or rhonchi .  GASTROINTESTINAL: Abdomen soft, nontender with positive bowel sounds are present.  EXTREMITIES:  Bilateral lower extremity 1/4 peripheral edema with pulses palpable throughout.   NEUROLOGIC: Cranial nerves II-XII grossly intact except for known chronic hearing and visual impairments. Muscle strength is 5/5 and DTRs are 2/4 throughout.There is no facial asymmetry and no resting tremors present  MUSCULOSKELETAL:There is left hip tenderness to palpation elicited.   LYMPHATICS: There is no obvious palpable axillary or inguinal adenopathy.   SKIN: There are mutiple echymotic areas in the left hip area without any ulcerations  OSTEOPATHIC EXAMINATION: No significant somatic dysfunction of clinical significance identified outside of pelvic and sacral area.    Labs/Imaging Studies:     Results for orders placed or performed during the hospital encounter of 23 (from the past 24 hour(s))   CBC and Auto Differential   Result Value Ref Range    WBC 9.7 4.4 - 11.3 x10*3/uL    nRBC 0.0 0.0 - 0.0 /100 WBCs    RBC 4.03 4.00 - 5.20 x10*6/uL    Hemoglobin 11.1 (L) 12.0 - 16.0 g/dL    Hematocrit 34.7 (L) 36.0 - 46.0 %    MCV 86 80 - 100 fL    MCH " 27.5 26.0 - 34.0 pg    MCHC 32.0 32.0 - 36.0 g/dL    RDW 17.2 (H) 11.5 - 14.5 %    Platelets 344 150 - 450 x10*3/uL    Neutrophils % 75.0 40.0 - 80.0 %    Immature Granulocytes %, Automated 0.4 0.0 - 0.9 %    Lymphocytes % 14.5 13.0 - 44.0 %    Monocytes % 8.5 2.0 - 10.0 %    Eosinophils % 1.3 0.0 - 6.0 %    Basophils % 0.3 0.0 - 2.0 %    Neutrophils Absolute 7.26 (H) 1.60 - 5.50 x10*3/uL    Immature Granulocytes Absolute, Automated 0.04 0.00 - 0.50 x10*3/uL    Lymphocytes Absolute 1.40 0.80 - 3.00 x10*3/uL    Monocytes Absolute 0.82 (H) 0.05 - 0.80 x10*3/uL    Eosinophils Absolute 0.13 0.00 - 0.40 x10*3/uL    Basophils Absolute 0.03 0.00 - 0.10 x10*3/uL   Comprehensive metabolic panel   Result Value Ref Range    Glucose 91 65 - 99 mg/dL    Sodium 139 133 - 145 mmol/L    Potassium 4.2 3.4 - 5.1 mmol/L    Chloride 107 97 - 107 mmol/L    Bicarbonate 22 (L) 24 - 31 mmol/L    Urea Nitrogen 20 8 - 25 mg/dL    Creatinine 1.10 0.40 - 1.60 mg/dL    eGFR 49 (L) >60 mL/min/1.73m*2    Calcium 8.9 8.5 - 10.4 mg/dL    Albumin 3.7 3.5 - 5.0 g/dL    Alkaline Phosphatase 244 (H) 35 - 125 U/L    Total Protein 7.1 5.9 - 7.9 g/dL    AST 18 5 - 40 U/L    Bilirubin, Total 0.3 0.1 - 1.2 mg/dL    ALT 16 5 - 40 U/L    Anion Gap 10 <=19 mmol/L   Urinalysis with Reflex Microscopic   Result Value Ref Range    Color, Urine Light-Yellow Light-Yellow, Yellow, Dark-Yellow    Appearance, Urine Turbid (N) Clear    Specific Gravity, Urine 1.014 1.005 - 1.035    pH, Urine 5.5 5.0, 5.5, 6.0, 6.5, 7.0, 7.5, 8.0    Protein, Urine NEGATIVE NEGATIVE, 10 (TRACE), 20 (TRACE) mg/dL    Glucose, Urine Normal Normal mg/dL    Blood, Urine NEGATIVE NEGATIVE    Ketones, Urine NEGATIVE NEGATIVE mg/dL    Bilirubin, Urine NEGATIVE NEGATIVE    Urobilinogen, Urine Normal Normal mg/dL    Nitrite, Urine NEGATIVE NEGATIVE    Leukocyte Esterase, Urine NEGATIVE NEGATIVE   SARS-CoV-2 RT PCR   Result Value Ref Range    Coronavirus 2019, PCR Not Detected Not Detected    Influenza A, and B PCR   Result Value Ref Range    Flu A Result Not Detected Not Detected    Flu B Result Not Detected Not Detected          XR chest 2 views   Final Result   No acute cardiopulmonary process.             Signed by: Waldo Waldrop 12/19/2023 3:25 PM   Dictation workstation:   [id number]       CT hip left wo IV contrast   Final Result   No evidence for acute fracture or dislocation. Moderate degenerative   change of the left hip joint.        Signed by: Waldo Waldrop 12/19/2023 4:29 PM   Dictation workstation:   [id number]       XR hip left with pelvis when performed 2 or 3 views   Final Result   No evidence for acute fracture or dislocation.        Mild-moderate degenerative change of the hip joints.        Signed by: Waldo Waldrop 12/19/2023 2:13 PM   Dictation workstation:   [id number]             XR chest 2 views   Final Result   No acute cardiopulmonary process.             Signed by: Waldo Waldrop 12/19/2023 3:25 PM   Dictation workstation:   [id number]       CT hip left wo IV contrast   Final Result   No evidence for acute fracture or dislocation. Moderate degenerative   change of the left hip joint.        Signed by: Waldo Waldrop 12/19/2023 4:29 PM   Dictation workstation:   [id number]       XR hip left with pelvis when performed 2 or 3 views   Final Result   No evidence for acute fracture or dislocation.        Mild-moderate degenerative change of the hip joints.        Signed by: Waldo Waldrop 12/19/2023 2:13 PM   Dictation workstation:   [id number]         DVT PROPHYLAXIS: Lovenox     Assessment/Impression:     82 yo female S/P fall  Worsening left hip pain due to above  Acute on chronic microcytic anemia  Essential hypertension  Hyperlipidemia  CAD with previous stent placements  Alzheimer's dementia  Osteoarthritis  Deconditioning and worsening mobility    Plan:   -The patient's labs, imaging studies and vital signs are noted with the case discussed with  the nursing staff. BP is being monitored for now on the patient's current medications (Lopressor, Cozaar and Cardizem) with repeat labs ordered in the am.  -ED case with orthopedic surgeon Dr. Covington.  Discussed patient's CT with him.  Reviewed the CT did not see an obvious fracture but patient is unable to ambulate would recommend admission to the hospital possible PT OT rehab and he would see the patient in consult believe that her pain is mostly related to arthritic changes.   -CBC noted with no need for transfusions unless pt develops severe bleeding, platelet counts less than 10,000 or Hgb becomes < 7.  -The surgeon has given instructions for wound care as well as post surgical DVT prophylaxis.  -The results of the renal labs are noted and will monitor closely. The patient's Is and Os are being monitored and the patient continues to be off nephrotoxic agents as much as possible. The patient is on IVFs as well.  -Discharge planner is on the case with PT and OT following as well.  -The patient continues to be on the rest of their chronic home medications for Alzheimer's dementia, osteoarthritis, etc.  Case discussed with patient's daughter on 12/19 and updated them of the patient's current condition as well as answered all of their questions to her satisfaction.  Approximately 50% time spent in discussing case with family  -Patient is a full code. Please see physician orders and further recommendations to follow.      Time spent examining the patient, reviewing the chart and managing the patient's care is from 40 minutes with approximately 50 percent of the time spent in coordinating care with the treatment team.    Signed:    Hector BERRY    12/19/2023    Portions of this note were dictated using [employer] and reviewed . While every effort was made to correct mistranscriptions, minor grammatical or typographical errors may be present from machine dictation

## 2023-12-20 LAB
ANION GAP SERPL CALC-SCNC: 9 MMOL/L
ATRIAL RATE: 71 BPM
BUN SERPL-MCNC: 23 MG/DL (ref 8–25)
CALCIUM SERPL-MCNC: 8.8 MG/DL (ref 8.5–10.4)
CHLORIDE SERPL-SCNC: 110 MMOL/L (ref 97–107)
CO2 SERPL-SCNC: 22 MMOL/L (ref 24–31)
CREAT SERPL-MCNC: 1.1 MG/DL (ref 0.4–1.6)
ERYTHROCYTE [DISTWIDTH] IN BLOOD BY AUTOMATED COUNT: 17.2 % (ref 11.5–14.5)
GFR SERPL CREATININE-BSD FRML MDRD: 49 ML/MIN/1.73M*2
GLUCOSE SERPL-MCNC: 99 MG/DL (ref 65–99)
HCT VFR BLD AUTO: 33.5 % (ref 36–46)
HGB BLD-MCNC: 10.8 G/DL (ref 12–16)
MAGNESIUM SERPL-MCNC: 2.2 MG/DL (ref 1.6–3.1)
MCH RBC QN AUTO: 27.9 PG (ref 26–34)
MCHC RBC AUTO-ENTMCNC: 32.2 G/DL (ref 32–36)
MCV RBC AUTO: 87 FL (ref 80–100)
NRBC BLD-RTO: 0 /100 WBCS (ref 0–0)
P AXIS: 47 DEGREES
P OFFSET: 169 MS
P ONSET: 140 MS
PHOSPHATE SERPL-MCNC: 4.2 MG/DL (ref 2.5–4.5)
PLATELET # BLD AUTO: 312 X10*3/UL (ref 150–450)
POTASSIUM SERPL-SCNC: 4.5 MMOL/L (ref 3.4–5.1)
PR INTERVAL: 176 MS
Q ONSET: 228 MS
QRS COUNT: 11 BEATS
QRS DURATION: 76 MS
QT INTERVAL: 428 MS
QTC CALCULATION(BAZETT): 465 MS
QTC FREDERICIA: 452 MS
R AXIS: 69 DEGREES
RBC # BLD AUTO: 3.87 X10*6/UL (ref 4–5.2)
SODIUM SERPL-SCNC: 141 MMOL/L (ref 133–145)
T AXIS: 50 DEGREES
T OFFSET: 442 MS
VENTRICULAR RATE: 71 BPM
WBC # BLD AUTO: 8.4 X10*3/UL (ref 4.4–11.3)

## 2023-12-20 PROCEDURE — 1100000001 HC PRIVATE ROOM DAILY

## 2023-12-20 PROCEDURE — 2500000001 HC RX 250 WO HCPCS SELF ADMINISTERED DRUGS (ALT 637 FOR MEDICARE OP): Performed by: INTERNAL MEDICINE

## 2023-12-20 PROCEDURE — 84100 ASSAY OF PHOSPHORUS: CPT | Performed by: INTERNAL MEDICINE

## 2023-12-20 PROCEDURE — 36415 COLL VENOUS BLD VENIPUNCTURE: CPT | Performed by: INTERNAL MEDICINE

## 2023-12-20 PROCEDURE — 83735 ASSAY OF MAGNESIUM: CPT | Performed by: INTERNAL MEDICINE

## 2023-12-20 PROCEDURE — 2500000004 HC RX 250 GENERAL PHARMACY W/ HCPCS (ALT 636 FOR OP/ED): Performed by: INTERNAL MEDICINE

## 2023-12-20 PROCEDURE — 2500000005 HC RX 250 GENERAL PHARMACY W/O HCPCS: Performed by: INTERNAL MEDICINE

## 2023-12-20 PROCEDURE — 96372 THER/PROPH/DIAG INJ SC/IM: CPT | Performed by: INTERNAL MEDICINE

## 2023-12-20 PROCEDURE — 80048 BASIC METABOLIC PNL TOTAL CA: CPT | Performed by: INTERNAL MEDICINE

## 2023-12-20 PROCEDURE — 85027 COMPLETE CBC AUTOMATED: CPT | Performed by: INTERNAL MEDICINE

## 2023-12-20 RX ADMIN — DILTIAZEM HYDROCHLORIDE 180 MG: 180 CAPSULE, COATED, EXTENDED RELEASE ORAL at 09:51

## 2023-12-20 RX ADMIN — TICAGRELOR 90 MG: 90 TABLET ORAL at 20:38

## 2023-12-20 RX ADMIN — ASPIRIN 81 MG CHEWABLE TABLET 81 MG: 81 TABLET CHEWABLE at 20:38

## 2023-12-20 RX ADMIN — PANTOPRAZOLE SODIUM 40 MG: 40 TABLET, DELAYED RELEASE ORAL at 06:54

## 2023-12-20 RX ADMIN — METOPROLOL TARTRATE 25 MG: 25 TABLET, FILM COATED ORAL at 09:51

## 2023-12-20 RX ADMIN — ASPIRIN 81 MG CHEWABLE TABLET 81 MG: 81 TABLET CHEWABLE at 09:51

## 2023-12-20 RX ADMIN — MEMANTINE 5 MG: 5 TABLET ORAL at 09:51

## 2023-12-20 RX ADMIN — ATORVASTATIN CALCIUM 80 MG: 80 TABLET, FILM COATED ORAL at 20:38

## 2023-12-20 RX ADMIN — LIDOCAINE 1 PATCH: 4 PATCH TOPICAL at 09:50

## 2023-12-20 RX ADMIN — TICAGRELOR 90 MG: 90 TABLET ORAL at 09:52

## 2023-12-20 RX ADMIN — ENOXAPARIN SODIUM 40 MG: 40 INJECTION SUBCUTANEOUS at 20:38

## 2023-12-20 RX ADMIN — LOSARTAN POTASSIUM 25 MG: 50 TABLET, FILM COATED ORAL at 09:51

## 2023-12-20 RX ADMIN — DONEPEZIL HYDROCHLORIDE 10 MG: 10 TABLET, FILM COATED ORAL at 20:38

## 2023-12-20 ASSESSMENT — COGNITIVE AND FUNCTIONAL STATUS - GENERAL
DAILY ACTIVITIY SCORE: 19
WALKING IN HOSPITAL ROOM: A LITTLE
PERSONAL GROOMING: A LITTLE
HELP NEEDED FOR BATHING: A LITTLE
DRESSING REGULAR UPPER BODY CLOTHING: A LITTLE
CLIMB 3 TO 5 STEPS WITH RAILING: A LITTLE
WALKING IN HOSPITAL ROOM: A LITTLE
MOVING TO AND FROM BED TO CHAIR: A LITTLE
CLIMB 3 TO 5 STEPS WITH RAILING: A LOT
DRESSING REGULAR LOWER BODY CLOTHING: A LITTLE
STANDING UP FROM CHAIR USING ARMS: A LITTLE
DRESSING REGULAR LOWER BODY CLOTHING: A LITTLE
STANDING UP FROM CHAIR USING ARMS: A LITTLE
DAILY ACTIVITIY SCORE: 22
TOILETING: A LITTLE
MOBILITY SCORE: 21
PERSONAL GROOMING: A LITTLE
MOBILITY SCORE: 19

## 2023-12-20 ASSESSMENT — PAIN SCALES - GENERAL
PAINLEVEL_OUTOF10: 0 - NO PAIN

## 2023-12-20 ASSESSMENT — PAIN - FUNCTIONAL ASSESSMENT
PAIN_FUNCTIONAL_ASSESSMENT: 0-10

## 2023-12-20 NOTE — PROGRESS NOTES
Occupational Therapy    Evaluation    Patient Name: Riya Hernandez  MRN: [id number]  Today's Date: 12/20/2023    General:  General  Missed Visit: Yes  Missed Visit Reason: Cancel  Prior to Session Communication: Bedside nurse  General Comment: Patient with active bedrest orders - notified nurse and MD of same

## 2023-12-20 NOTE — PROGRESS NOTES
Occupational Therapy                 Therapy Communication Note    Patient Name: Riya Hernandez  MRN: [id number]  Today's Date: 12/20/2023     Discipline: Occupational Therapy    Missed Visit Reason: Missed Visit Reason: Cancel, Other (Comment) (Awaiting ortho consult)    Missed Time: Cancel    Comment:

## 2023-12-20 NOTE — CONSULTS
"Reason For Consult  Leg pain    History Of Present Illness  Riya Hernandez is a 83 y.o. female presenting with pain in the left leg after a fall.  Patient states she is not quite sure when she fell may have been a couple days ago may have been yesterday either way she states the leg is feeling much better she can move around pretty happily is not really bothering her terribly much at all no other new concerns.     Past Medical History  She has a past medical history of Alzheimer disease (CMS/[facility]), Anxiety, Arrhythmia, Chest pain, Coronary artery disease, Delirium, Delirium, Dementia (CMS/[facility]), Hyperlipidemia, Hypertension, Lightheadedness, Myocardial infarction (CMS/[facility]), and Near syncope.    Surgical History  She has a past surgical history that includes Cardiac catheterization and Coronary stent placement.     Social History  She reports that she has been smoking cigarettes. She has a 2.50 pack-year smoking history. She has never been exposed to tobacco smoke. She has never used smokeless tobacco. She reports that she does not drink alcohol and does not use drugs.    Family History  Family History   Problem Relation Name Age of Onset    Heart disease Mother      Other (triple bypass) Mother      Heart disease Brother      Sudden death Brother          cardiac death        Allergies  Patient has no known allergies.    Review of Systems  No complaints     Physical Exam  Normocephalic atraumatic respirations nonlabored regular rate and rhythm abdomen soft nontender no tense palpation about the left hip or leg no pain with passive or active flexion extension internal/external rotation 5/5 strength with flexion extension no calf pain tenderness swelling sensation intact light touch over the median radial and ulnar distributions     Last Recorded Vitals  Blood pressure 133/53, pulse 78, temperature 36.3 °C (97.3 °F), temperature source Temporal, resp. rate 16, height 1.6 m (5' 3\"), weight 62.6 kg (138 lb), SpO2 96 " %.    Relevant Results  X-ray and CT scan of the left hip and leg do not show any fractures dislocation or any significant degenerative changes     Assessment/Plan     83-year-old female with a left hip contusion discussed with her that it looks like she is has a contusion she may be up weightbearing as therapy was tolerated really seems to follow-up on an as-needed basis should any issues arise.      Anil Fournier MD

## 2023-12-20 NOTE — PROGRESS NOTES
Physical Therapy                 Therapy Communication Note    Patient Name: Riya Hernandez  MRN: [id number]  Today's Date: 12/20/2023     Discipline: Physical Therapy    Missed Visit Reason: Missed Visit Reason: Cancel    Missed Time: Cancel    Comment: Pt currently w/ bedrest orders until 1736 today. Pt was (-) for Lt hip fx per CT, awaiting further input and clearance for activity as appropriate. Nurse informed.    Addendum 1540: no updated activity orders at this time.

## 2023-12-20 NOTE — PROGRESS NOTES
Spiritual Care Visit    Clinical Encounter Type  Visited With: Patient and family together  Routine Visit: Introduction  Continue Visiting: Yes         Values/Beliefs  Spiritual Requests During Hospitalization: Anointing & Communion today    Sacramental Encounters  Communion: Patient wants communion  Communion Given Indicator: Yes  Sacrament of Sick-Anointing: Anointed     Daughter: Pooja Negron

## 2023-12-20 NOTE — PROGRESS NOTES
"HOSPITALIST  PROGRESS NOTE   Riya Hernandez    :  1940    Medical Record:  [id number]    DATE OF SERVICE: 2023  ADMIT DAY: 1.    Subjective:  Riya Hernandez is a 83 y.o. year-old female who was admitted on 2023 for a fall about a week ago that led to intractable left hip pain and inability to ambulate.  The patient's labs, imaging studies and vital signs are noted with the case discussed with the nursing staff. The patient was seen and examined and the chart was reviewed. The patient is being seen for management of their BP along with the pt's other medical conditions. Today pt reports \"still feeling weak\" but she denies any F/C/CP/SOB/N/V/D/Abd pain.    Objective:  Vitals:    23 0700   BP: 133/53   Pulse: 78   Resp: 16   Temp: 36.3 °C (97.3 °F)   SpO2: 96%        I/O last 3 completed shifts:  In: 100 (1.6 mL/kg) [P.O.:100]  Out: 200 (3.2 mL/kg) [Urine:200 (0.1 mL/kg/hr)]  Weight: 62.6 kg   No intake/output data recorded.  Pulmonary: RA  GENERAL: A  female who is  A&O x 2-3 and is following commands but confused at times.  HEENT: Normocephalic, atraumatic.  Pupils 2-4 mm OU.  Oral mucosa pink and moist without ulceration.   NECK: Supple, trachea is midline without bruits.   CARDIOVASCULAR: Regular rate and rhythm. S1, S2. No obvious S3, S4.   PULMONARY: Decreased breath sounds at the bases without rales or rhonchi .  GASTROINTESTINAL: Abdomen soft, nontender with positive bowel sounds are present.  EXTREMITIES:  Bilateral lower extremity 1/4 peripheral edema with pulses palpable throughout.   NEUROLOGIC: Cranial nerves II-XII grossly intact except for known chronic hearing and visual impairments. Muscle strength is 5/5 and DTRs are 2/4 throughout.There is no facial asymmetry and no resting tremors present  MUSCULOSKELETAL:There is left hip tenderness to palpation elicited.     LABS:  Results for orders placed or performed during the hospital encounter of 23 (from the " past 24 hour(s))   CBC and Auto Differential   Result Value Ref Range    WBC 9.7 4.4 - 11.3 x10*3/uL    nRBC 0.0 0.0 - 0.0 /100 WBCs    RBC 4.03 4.00 - 5.20 x10*6/uL    Hemoglobin 11.1 (L) 12.0 - 16.0 g/dL    Hematocrit 34.7 (L) 36.0 - 46.0 %    MCV 86 80 - 100 fL    MCH 27.5 26.0 - 34.0 pg    MCHC 32.0 32.0 - 36.0 g/dL    RDW 17.2 (H) 11.5 - 14.5 %    Platelets 344 150 - 450 x10*3/uL    Neutrophils % 75.0 40.0 - 80.0 %    Immature Granulocytes %, Automated 0.4 0.0 - 0.9 %    Lymphocytes % 14.5 13.0 - 44.0 %    Monocytes % 8.5 2.0 - 10.0 %    Eosinophils % 1.3 0.0 - 6.0 %    Basophils % 0.3 0.0 - 2.0 %    Neutrophils Absolute 7.26 (H) 1.60 - 5.50 x10*3/uL    Immature Granulocytes Absolute, Automated 0.04 0.00 - 0.50 x10*3/uL    Lymphocytes Absolute 1.40 0.80 - 3.00 x10*3/uL    Monocytes Absolute 0.82 (H) 0.05 - 0.80 x10*3/uL    Eosinophils Absolute 0.13 0.00 - 0.40 x10*3/uL    Basophils Absolute 0.03 0.00 - 0.10 x10*3/uL   Comprehensive metabolic panel   Result Value Ref Range    Glucose 91 65 - 99 mg/dL    Sodium 139 133 - 145 mmol/L    Potassium 4.2 3.4 - 5.1 mmol/L    Chloride 107 97 - 107 mmol/L    Bicarbonate 22 (L) 24 - 31 mmol/L    Urea Nitrogen 20 8 - 25 mg/dL    Creatinine 1.10 0.40 - 1.60 mg/dL    eGFR 49 (L) >60 mL/min/1.73m*2    Calcium 8.9 8.5 - 10.4 mg/dL    Albumin 3.7 3.5 - 5.0 g/dL    Alkaline Phosphatase 244 (H) 35 - 125 U/L    Total Protein 7.1 5.9 - 7.9 g/dL    AST 18 5 - 40 U/L    Bilirubin, Total 0.3 0.1 - 1.2 mg/dL    ALT 16 5 - 40 U/L    Anion Gap 10 <=19 mmol/L   Urinalysis with Reflex Microscopic   Result Value Ref Range    Color, Urine Light-Yellow Light-Yellow, Yellow, Dark-Yellow    Appearance, Urine Turbid (N) Clear    Specific Gravity, Urine 1.014 1.005 - 1.035    pH, Urine 5.5 5.0, 5.5, 6.0, 6.5, 7.0, 7.5, 8.0    Protein, Urine NEGATIVE NEGATIVE, 10 (TRACE), 20 (TRACE) mg/dL    Glucose, Urine Normal Normal mg/dL    Blood, Urine NEGATIVE NEGATIVE    Ketones, Urine NEGATIVE NEGATIVE  mg/dL    Bilirubin, Urine NEGATIVE NEGATIVE    Urobilinogen, Urine Normal Normal mg/dL    Nitrite, Urine NEGATIVE NEGATIVE    Leukocyte Esterase, Urine NEGATIVE NEGATIVE   SARS-CoV-2 RT PCR   Result Value Ref Range    Coronavirus 2019, PCR Not Detected Not Detected   Influenza A, and B PCR   Result Value Ref Range    Flu A Result Not Detected Not Detected    Flu B Result Not Detected Not Detected   ECG 12 lead   Result Value Ref Range    Ventricular Rate 71 BPM    Atrial Rate 71 BPM    PA Interval 176 ms    QRS Duration 76 ms    QT Interval 428 ms    QTC Calculation(Bazett) 465 ms    P Axis 47 degrees    R Axis 69 degrees    T Axis 50 degrees    QRS Count 11 beats    Q Onset 228 ms    P Onset 140 ms    P Offset 169 ms    T Offset 442 ms    QTC Fredericia 452 ms   Phosphorus   Result Value Ref Range    Phosphorus 4.2 2.5 - 4.5 mg/dL   Magnesium   Result Value Ref Range    Magnesium 2.20 1.60 - 3.10 mg/dL   Basic Metabolic Panel   Result Value Ref Range    Glucose 99 65 - 99 mg/dL    Sodium 141 133 - 145 mmol/L    Potassium 4.5 3.4 - 5.1 mmol/L    Chloride 110 (H) 97 - 107 mmol/L    Bicarbonate 22 (L) 24 - 31 mmol/L    Urea Nitrogen 23 8 - 25 mg/dL    Creatinine 1.10 0.40 - 1.60 mg/dL    eGFR 49 (L) >60 mL/min/1.73m*2    Calcium 8.8 8.5 - 10.4 mg/dL    Anion Gap 9 <=19 mmol/L   CBC   Result Value Ref Range    WBC 8.4 4.4 - 11.3 x10*3/uL    nRBC 0.0 0.0 - 0.0 /100 WBCs    RBC 3.87 (L) 4.00 - 5.20 x10*6/uL    Hemoglobin 10.8 (L) 12.0 - 16.0 g/dL    Hematocrit 33.5 (L) 36.0 - 46.0 %    MCV 87 80 - 100 fL    MCH 27.9 26.0 - 34.0 pg    MCHC 32.2 32.0 - 36.0 g/dL    RDW 17.2 (H) 11.5 - 14.5 %    Platelets 312 150 - 450 x10*3/uL      MEDICATIONS:  Scheduled medications  aspirin, 81 mg, oral, BID  atorvastatin, 80 mg, oral, Nightly  dilTIAZem CD, 180 mg, oral, Daily  donepezil, 10 mg, oral, Nightly  enoxaparin, 40 mg, subcutaneous, q24h  lidocaine, 1 patch, transdermal, Daily  losartan, 25 mg, oral, Daily  memantine, 5 mg,  oral, Daily  metoprolol tartrate, 25 mg, oral, Daily  pantoprazole, 40 mg, oral, Daily before breakfast   Or  pantoprazole, 40 mg, intravenous, Daily before breakfast  ticagrelor, 90 mg, oral, BID      Continuous medications     PRN medications  PRN medications: acetaminophen **OR** acetaminophen **OR** acetaminophen, bisacodyl, HYDROcodone-acetaminophen    PERTINENT IMAGING STUDIES/PROCEDURES:    ECG 12 lead 12/20/2023  Normal sinus rhythm Nonspecific ST and T wave abnormality Abnormal ECG No previous ECGs available    XR chest 2 views   Final Result   No acute cardiopulmonary process.             Signed by: Waldo Waldrop 12/19/2023 3:25 PM   Dictation workstation:   [id number]       CT hip left wo IV contrast   Final Result   No evidence for acute fracture or dislocation. Moderate degenerative   change of the left hip joint.        Signed by: Waldo Waldrop 12/19/2023 4:29 PM   Dictation workstation:   [id number]       XR hip left with pelvis when performed 2 or 3 views   Final Result   No evidence for acute fracture or dislocation.        Mild-moderate degenerative change of the hip joints.        Signed by: Waldo Waldrop 12/19/2023 2:13 PM   Dictation workstation:   [id number]             XR chest 2 views   Final Result   No acute cardiopulmonary process.             Signed by: Waldo Waldrop 12/19/2023 3:25 PM   Dictation workstation:   [id number]       CT hip left wo IV contrast   Final Result   No evidence for acute fracture or dislocation. Moderate degenerative   change of the left hip joint.        Signed by: Waldo Waldrop 12/19/2023 4:29 PM   Dictation workstation:   [id number]       XR hip left with pelvis when performed 2 or 3 views   Final Result   No evidence for acute fracture or dislocation.        Mild-moderate degenerative change of the hip joints.        Signed by: Waldo Waldrop 12/19/2023 2:13 PM   Dictation workstation:   [id number]         Assessment:  Riya SMITH  Mary is a 83 y.o. year-old female on admission for 1 days    82 yo female S/P fall  Worsening left hip pain due to above  Acute on chronic microcytic anemia  Essential hypertension  Hyperlipidemia  CAD with previous stent placements  Alzheimer's dementia  Osteoarthritis  Deconditioning and worsening mobility    MUSCULOSKELETAL:     1- Dr. Covington of orthopedic surgery consulted and await recommendations.  2-generalized weakness: PT&OT    CARDIOLOGY:  -BP is being monitored on the patient's current medications (Lopressor, Cozaar and Cardizem)  with repeat labs reviewed from 12/20.    ID:  Pt continues to be off antibiotics    PSYCHIATRY/NEUROLOGY:   -Pt given 1 dose of oral Ativan on 12/19, discontinue benzodiazepine and start as needed Vistaril for anxiet    GI:   Protonix     HEMATOLOGY:  -CBC noted with no need for transfusions unless pt develops severe bleeding, platelet counts less than 10,000 or Hgb becomes < 7.     OTHER:    -The patient continues to be on the rest of their chronic home medications for Alzheimer's dementia, osteoarthritis, etc.  Case discussed with patient's son as well as daughter once again on 12/20 and updated them of the patient's current condition as well as answered all of their questions to her satisfaction.  Approximately 50% time spent in discussing case with family   -Discharge planner is on the case.    *CODE STATUS:Full     *DVT prophylaxis:  Lovenox     *PUD PROPHYLAXIS:  Protonix     Time spent managing patient's care is greater than 40 minutes with approximately 50% or more spent in counseling and coordination of care.    Hector BERRY    Portions of this Progress note were dictated using [email or web address] and reviewed . While every effort was made to correct mistranscriptions, minor grammatical or typographical errors may be present from machine dictation

## 2023-12-20 NOTE — CARE PLAN
Problem: Pain  Goal: My pain/discomfort is manageable  Outcome: Progressing  Flowsheets (Taken 12/19/2023 2205)  Resident's pain/discomfort is manageable: Include resident/family/caregiver in decisions related to pain management     Problem: Safety  Goal: Patient will be injury free during hospitalization  Outcome: Progressing  Goal: I will remain free of falls  Outcome: Progressing  Flowsheets (Taken 12/19/2023 2205)  Resident will remain free of falls:   Apply bed/chair alarms as appropriate   Assist with toileting as orderd   Maintain bed at position as ordered (chair height, low bed)   Assess and monitor medications that may increase fall risk   Visual checks per facility policy   Consider transfer to room close to nurses' station     Problem: Daily Care  Goal: Daily care needs are met  Outcome: Progressing  Flowsheets (Taken 12/19/2023 2205)  Daily care needs are met:   Assess and monitor ability to perform self care and identify potential discharge needs   Assist patient with activities of daily living as needed   Encourage independent activity per ability   Include patient/family/caregiver in decisions related to daily care     Problem: Psychosocial Needs  Goal: Demonstrates ability to cope with hospitalization/illness  Outcome: Progressing  Flowsheets (Taken 12/19/2023 2205)  Demonstrates ability to cope with hospitalization/illness: Encourage verbalization of feelings/concerns/expectations     Problem: Discharge Barriers  Goal: My discharge needs are met  Outcome: Progressing  Flowsheets (Taken 12/19/2023 2205)  Resident's discharge needs are met:   Identify potential discharge barriers on admission and throughout stay   Involve resident/family/caregiver in discharge planning process     Problem: Skin  Goal: Decreased wound size/increased tissue granulation at next dressing change  Outcome: Progressing  Flowsheets (Taken 12/19/2023 2205)  Decreased wound size/increased tissue granulation at next dressing  change: Promote sleep for wound healing  Goal: Participates in plan/prevention/treatment measures  Outcome: Progressing  Flowsheets (Taken 12/19/2023 2205)  Participates in plan/prevention/treatment measures: Elevate heels  Goal: Prevent/manage excess moisture  Outcome: Progressing  Flowsheets (Taken 12/19/2023 2205)  Prevent/manage excess moisture:   Monitor for/manage infection if present   Moisturize dry skin  Goal: Prevent/minimize sheer/friction injuries  Outcome: Progressing  Flowsheets (Taken 12/19/2023 2205)  Prevent/minimize sheer/friction injuries:   Use pull sheet   HOB 30 degrees or less  Goal: Promote/optimize nutrition  Outcome: Progressing  Flowsheets (Taken 12/19/2023 2205)  Promote/optimize nutrition:   Offer water/supplements/favorite foods   Monitor/record intake including meals  Goal: Promote skin healing  Outcome: Progressing  Flowsheets (Taken 12/19/2023 2205)  Promote skin healing: Turn/reposition every 2 hours/use positioning/transfer devices     Problem: Fall/Injury  Goal: Not fall by end of shift  Outcome: Progressing  Goal: Be free from injury by end of the shift  Outcome: Progressing  Goal: Verbalize understanding of personal risk factors for fall in the hospital  Outcome: Progressing     Problem: Chronic Conditions and Co-morbidities  Goal: Patient's chronic conditions and co-morbidity symptoms are monitored and maintained or improved  Outcome: Progressing  Flowsheets (Taken 12/19/2023 2205)  Care Plan - Patient's Chronic Conditions and Co-Morbidity Symptoms are Monitored and Maintained or Improved: Monitor and assess patient's chronic conditions and comorbid symptoms for stability, deterioration, or improvement   The patient's goals for the shift include  rest,     The clinical goals for the shift include Safety and comfort      12/19/23 at 10:08 PM - Alisa Romano RN

## 2023-12-20 NOTE — CARE PLAN
The patient's goals for the shift include      The clinical goals for the shift include Safety and comfort      Problem: Pain  Goal: My pain/discomfort is manageable  Outcome: Progressing     Problem: Safety  Goal: Patient will be injury free during hospitalization  Outcome: Progressing  Goal: I will remain free of falls  Outcome: Progressing     Problem: Daily Care  Goal: Daily care needs are met  Outcome: Progressing     Problem: Psychosocial Needs  Goal: Demonstrates ability to cope with hospitalization/illness  Outcome: Progressing  Goal: Collaborate with me, my family, and caregiver to identify my specific goals  Outcome: Progressing     Problem: Discharge Barriers  Goal: My discharge needs are met  Outcome: Progressing     Problem: Skin  Goal: Decreased wound size/increased tissue granulation at next dressing change  Outcome: Progressing  Goal: Participates in plan/prevention/treatment measures  Outcome: Progressing  Goal: Prevent/manage excess moisture  Outcome: Progressing  Goal: Prevent/minimize sheer/friction injuries  Outcome: Progressing  Goal: Promote/optimize nutrition  Outcome: Progressing  Goal: Promote skin healing  Outcome: Progressing     Problem: Fall/Injury  Goal: Not fall by end of shift  Outcome: Progressing  Goal: Be free from injury by end of the shift  Outcome: Progressing  Goal: Verbalize understanding of personal risk factors for fall in the hospital  Outcome: Progressing  Goal: Verbalize understanding of risk factor reduction measures to prevent injury from fall in the home  Outcome: Progressing  Goal: Use assistive devices by end of the shift  Outcome: Progressing  Goal: Pace activities to prevent fatigue by end of the shift  Outcome: Progressing     Problem: Chronic Conditions and Co-morbidities  Goal: Patient's chronic conditions and co-morbidity symptoms are monitored and maintained or improved  Outcome: Progressing

## 2023-12-20 NOTE — PROGRESS NOTES
Met with patient and family bedside.  Discussed discharge planning.  Daughter would like SNF (memory care unit).  Choice obtained and sent to [facility] for referral processing to [facility].  Patient will need 3 midnights which will be discharge on Friday.    Chantal Robert RN

## 2023-12-21 PROCEDURE — 97166 OT EVAL MOD COMPLEX 45 MIN: CPT | Mod: GO

## 2023-12-21 PROCEDURE — 2500000001 HC RX 250 WO HCPCS SELF ADMINISTERED DRUGS (ALT 637 FOR MEDICARE OP): Performed by: INTERNAL MEDICINE

## 2023-12-21 PROCEDURE — 2500000004 HC RX 250 GENERAL PHARMACY W/ HCPCS (ALT 636 FOR OP/ED): Performed by: INTERNAL MEDICINE

## 2023-12-21 PROCEDURE — 96372 THER/PROPH/DIAG INJ SC/IM: CPT | Performed by: INTERNAL MEDICINE

## 2023-12-21 PROCEDURE — 1100000001 HC PRIVATE ROOM DAILY

## 2023-12-21 PROCEDURE — 97162 PT EVAL MOD COMPLEX 30 MIN: CPT | Mod: GP

## 2023-12-21 PROCEDURE — 2500000005 HC RX 250 GENERAL PHARMACY W/O HCPCS: Performed by: INTERNAL MEDICINE

## 2023-12-21 RX ORDER — HYDROXYZINE PAMOATE 25 MG/1
25 CAPSULE ORAL EVERY 8 HOURS PRN
Status: DISCONTINUED | OUTPATIENT
Start: 2023-12-21 | End: 2023-12-22 | Stop reason: HOSPADM

## 2023-12-21 RX ORDER — IBUPROFEN 200 MG
1 TABLET ORAL DAILY
Status: DISCONTINUED | OUTPATIENT
Start: 2023-12-21 | End: 2023-12-22 | Stop reason: HOSPADM

## 2023-12-21 RX ADMIN — LIDOCAINE 1 PATCH: 4 PATCH TOPICAL at 10:05

## 2023-12-21 RX ADMIN — TICAGRELOR 90 MG: 90 TABLET ORAL at 20:18

## 2023-12-21 RX ADMIN — HYDROCODONE BITARTRATE AND ACETAMINOPHEN 1 TABLET: 5; 325 TABLET ORAL at 10:03

## 2023-12-21 RX ADMIN — ASPIRIN 81 MG CHEWABLE TABLET 81 MG: 81 TABLET CHEWABLE at 10:02

## 2023-12-21 RX ADMIN — DONEPEZIL HYDROCHLORIDE 10 MG: 10 TABLET, FILM COATED ORAL at 20:18

## 2023-12-21 RX ADMIN — DILTIAZEM HYDROCHLORIDE 180 MG: 180 CAPSULE, COATED, EXTENDED RELEASE ORAL at 10:03

## 2023-12-21 RX ADMIN — LOSARTAN POTASSIUM 25 MG: 50 TABLET, FILM COATED ORAL at 10:04

## 2023-12-21 RX ADMIN — TICAGRELOR 90 MG: 90 TABLET ORAL at 10:05

## 2023-12-21 RX ADMIN — METOPROLOL TARTRATE 25 MG: 25 TABLET, FILM COATED ORAL at 10:04

## 2023-12-21 RX ADMIN — ATORVASTATIN CALCIUM 80 MG: 80 TABLET, FILM COATED ORAL at 20:18

## 2023-12-21 RX ADMIN — ASPIRIN 81 MG CHEWABLE TABLET 81 MG: 81 TABLET CHEWABLE at 20:18

## 2023-12-21 RX ADMIN — MEMANTINE 5 MG: 5 TABLET ORAL at 10:04

## 2023-12-21 RX ADMIN — ENOXAPARIN SODIUM 40 MG: 40 INJECTION SUBCUTANEOUS at 20:18

## 2023-12-21 RX ADMIN — PANTOPRAZOLE SODIUM 40 MG: 40 TABLET, DELAYED RELEASE ORAL at 06:35

## 2023-12-21 ASSESSMENT — PAIN SCALES - GENERAL
PAINLEVEL_OUTOF10: 4
PAINLEVEL_OUTOF10: 0 - NO PAIN
PAINLEVEL_OUTOF10: 8
PAINLEVEL_OUTOF10: 8
PAINLEVEL_OUTOF10: 0 - NO PAIN
PAINLEVEL_OUTOF10: 6
PAINLEVEL_OUTOF10: 0 - NO PAIN
PAINLEVEL_OUTOF10: 0 - NO PAIN

## 2023-12-21 ASSESSMENT — COGNITIVE AND FUNCTIONAL STATUS - GENERAL
HELP NEEDED FOR BATHING: A LITTLE
DRESSING REGULAR LOWER BODY CLOTHING: A LITTLE
MOBILITY SCORE: 19
DAILY ACTIVITIY SCORE: 20
TOILETING: A LITTLE
MOVING TO AND FROM BED TO CHAIR: A LITTLE
CLIMB 3 TO 5 STEPS WITH RAILING: A LITTLE
DRESSING REGULAR UPPER BODY CLOTHING: A LITTLE
STANDING UP FROM CHAIR USING ARMS: A LITTLE
DRESSING REGULAR UPPER BODY CLOTHING: A LITTLE
TURNING FROM BACK TO SIDE WHILE IN FLAT BAD: A LITTLE
WALKING IN HOSPITAL ROOM: A LITTLE
WALKING IN HOSPITAL ROOM: A LITTLE
DRESSING REGULAR LOWER BODY CLOTHING: A LITTLE
MOVING TO AND FROM BED TO CHAIR: A LITTLE
TURNING FROM BACK TO SIDE WHILE IN FLAT BAD: A LITTLE
STANDING UP FROM CHAIR USING ARMS: A LITTLE
CLIMB 3 TO 5 STEPS WITH RAILING: A LITTLE
MOBILITY SCORE: 19
HELP NEEDED FOR BATHING: A LITTLE
TOILETING: A LITTLE

## 2023-12-21 ASSESSMENT — ACTIVITIES OF DAILY LIVING (ADL)
ADL_ASSISTANCE: INDEPENDENT
HOME_MANAGEMENT_TIME_ENTRY: 5
BATHING_ASSISTANCE: STAND BY

## 2023-12-21 ASSESSMENT — PAIN - FUNCTIONAL ASSESSMENT
PAIN_FUNCTIONAL_ASSESSMENT: 0-10

## 2023-12-21 NOTE — PROGRESS NOTES
Spoke with family bedside again.  Firmed up plan for Discharge tomorrow/ Friday to [facility] with transition to LTC.    Chantal Robert RN

## 2023-12-21 NOTE — CARE PLAN
Problem: Balance  Goal: LTG - Patient will maintain balance to allow for safe mobility  Outcome: Progressing     Problem: Mobility  Goal: STG - Patient will ambulate 90' w/ distant supervision and RW  Outcome: Progressing  Goal: STG - Patient will ascend and descend four stairs w/ 2 rails and min assist  Outcome: Progressing     Problem: Transfers  Goal: STG - Patient to transfer to and from sit to supine w/ distant supervision   Outcome: Progressing  Goal: STG - Patient will transfer sit to and from stand w/ distant supervision  Outcome: Progressing

## 2023-12-21 NOTE — CARE PLAN
Problem: Pain  Goal: My pain/discomfort is manageable  Outcome: Progressing   The patient's goals for the shift include discharge     The clinical goals for the shift include safety

## 2023-12-21 NOTE — PROGRESS NOTES
Physical Therapy    Physical Therapy Evaluation    Patient Name: Riya Hernandez  MRN: [id number]  Today's Date: 12/21/2023   Time Calculation  Start Time: 0815  Stop Time: 0830  Time Calculation (min): 15 min    Assessment/Plan   PT Assessment  PT Assessment Results: Pain, Decreased safety awareness, Impaired judgement, Decreased cognition, Decreased mobility, Impaired balance, Decreased strength  Rehab Prognosis: Good  Evaluation/Treatment Tolerance: Patient tolerated treatment well (did have increased pain when weightbearing LLE.)  Strengths: Attitude of self, Premorbid level of function  Barriers to Participation: Comorbidities, Ability to acquire knowledge  Assessment Comment: Pt is an 83 y.o. female adm for Lt hip pain following 2 unwitnessed falls. Pt w/ cardiac hx as well as  dementia and near syncope. Pt lives w/ dtr and S-I-L, reports is MOD I w/ ADLs and amb. Pt is moving at CGA level, requires use of 2WW due to pain w/ LLE weightbearing. Pt able to amb at least household distance on eval.  End of Session Patient Position: Up in chair, Alarm on (set up w/ breakfast)  IP OR SWING BED PT PLAN  Inpatient or Swing Bed: Inpatient  PT Plan  Treatment/Interventions: Bed mobility, Transfer training, Gait training, Stair training, Balance training, Strengthening, Endurance training, Range of motion, Therapeutic exercise, Therapeutic activity  PT Plan: Skilled PT  PT Frequency: 4 times per week  PT Discharge Recommendations: Low intensity level of continued care, 24 hr supervision due to cognition  Equipment Recommended upon Discharge: Wheeled walker  PT Recommended Transfer Status: Contact guard, Assistive device  PT - OK to Discharge: Yes      Subjective   General Visit Information:  General  Reason for Referral: impaired mobility, recent falls  Referred By: Dr Reese  Past Medical History Relevant to Rehab: dementia, CAD, HTN, HLD  Missed Visit: Yes  Missed Visit Reason: Cancel  Family/Caregiver Present:  "No  Prior to Session Communication: Bedside nurse  Patient Position Received: Up in chair, Alarm on  Preferred Learning Style: verbal  General Comment: Pt was cleared for PT eval, pt agreeable  Home Living:  Home Living  Type of Home: House  Lives With: Adult children (dtr, S-I-L)  Home Adaptive Equipment: Walker rolling or standard  Home Layout: One level  Home Access: Stairs to enter with rails  Entrance Stairs-Rails: Both  Entrance Stairs-Number of Steps: 3  Bathroom Shower/Tub: Walk-in shower  Bathroom Equipment: Grab bars in shower  Home Living Comments: patient provided home/intake information and is a questionable historian. Reports dtr and S-I-L both work.  Prior Level of Function:  Prior Function Per Pt/Caregiver Report  Level of [place]: Independent with ADLs and functional transfers (Grossly IND amb w/ RW prn)  Receives Help From: Family  ADL Assistance: Independent  Homemaking Assistance: Needs assistance (per family)  Ambulatory Assistance: Independent (uses a walker \"as needed\" per patient)  Precautions:  Precautions  Hearing/Visual Limitations: corrective lenses for reading; hearing intact  LE Weight Bearing Status: Weight Bearing as Tolerated (LLE)  Medical Precautions: Fall precautions  Precautions Comment: Pt w/ dementia  Vital Signs:       Objective   Pain:  Pain Assessment  Pain Assessment: 0-10  Pain Score: 0 - No pain (0-rest, notes ~ 5/10 w/ activity)  Pain Type: Acute pain  Pain Location: Hip  Pain Orientation: Left  Cognition:  Cognition  Overall Cognitive Status: Impaired at baseline  Orientation Level: Disoriented to time, Disoriented to situation  Safety/Judgement: Exceptions to WFL  Other (Comment): very pleasant  Insight: Moderate, Mild    General Assessments:                  Activity Tolerance  Endurance: Decreased tolerance for upright activites (pain LLE w/ weightbearing)    Sensation  Sensation Comment: Denies tingling/numbness    Strength  Strength Comments: BLE 3+/5 or " >  Strength  Strength Comments: BLE 3+/5 or >                          Dynamic Standing Balance  Dynamic Standing-Balance Support: Bilateral upper extremity supported  Dynamic Standing-Comments: Fair/Fair- w/ 2WW; slightly unsteadey on turns but no LOB during session  Functional Assessments:  Bed Mobility  Bed Mobility: No    Transfers  Transfer: Yes  Transfer 1  Technique 1: Sit to stand, Stand to sit  Transfer Device 1: Walker (none on first attempt)  Transfer Level of Assistance 1: Close supervision (verbal cues for safe hand placement)  Trials/Comments 1: Performed from/to bedside nad to/from commode    Ambulation/Gait Training  Ambulation/Gait Training Performed: Yes  Ambulation/Gait Training 1  Surface 1: Level tile  Device 1: No device, Rolling walker  Assistance 1: Contact guard, Close supervision  Comments/Distance (ft) 1: Pt took a couple of steps after standing from bedside chair, limping LLE due to pain; continued w 2WW w/ improved performance and tolerance. Pt amb ~ 70', slow to fair continuous pace. Cues to slow down on turns w/ mild unsteadiness but no LOB. Pt keeps good positioning to RW.  Extremity/Trunk Assessments:  RLE   RLE : Within Functional Limits  LLE   LLE : Within Functional Limits  Outcome Measures:  [facility] Basic Mobility  Turning from your back to your side while in a flat bed without using bedrails: None  Moving from lying on your back to sitting on the side of a flat bed without using bedrails: A little  Moving to and from bed to chair (including a wheelchair): A little  Standing up from a chair using your arms (e.g. wheelchair or bedside chair): A little  To walk in hospital room: A little  Climbing 3-5 steps with railing: A little  Basic Mobility - Total Score: 19    Encounter Problems       Encounter Problems (Active)       Balance       LTG - Patient will maintain balance to allow for safe mobility (Progressing)       Start:  12/21/23    Expected End:  12/24/23                Mobility       STG - Patient will ambulate 90' w/ distant supervision and RW (Progressing)       Start:  12/21/23    Expected End:  12/24/23            STG - Patient will ascend and descend four stairs w/ 2 rails and min assist (Progressing)       Start:  12/21/23    Expected End:  12/24/23                 Transfers       STG - Patient to transfer to and from sit to supine w/ distant supervision  (Progressing)       Start:  12/21/23    Expected End:  12/24/23            STG - Patient will transfer sit to and from stand w/ distant supervision (Progressing)       Start:  12/21/23    Expected End:  12/24/23                   Education Documentation  Precautions, taught by Vanessa Seth, PT at 12/21/2023 12:12 PM.  Learner: Patient  Readiness: Acceptance  Method: Explanation  Response: Needs Reinforcement    Mobility Training, taught by Vanessa Seth, PT at 12/21/2023 12:12 PM.  Learner: Patient  Readiness: Acceptance  Method: Explanation  Response: Needs Reinforcement    Education Comments  No comments found.

## 2023-12-21 NOTE — PROGRESS NOTES
"HOSPITALIST  PROGRESS NOTE   Riya Hernandez    :  1940    Medical Record:  [id number]    DATE OF SERVICE: 2023  ADMIT DAY: 2.    Subjective:  Riya Hernandez is a 83 y.o. year-old female who was admitted on 2023 for a fall about a week ago that led to intractable left hip pain and inability to ambulate.  The patient's labs, imaging studies and vital signs are noted with the case discussed with the nursing staff. The patient was seen and examined and the chart was reviewed. The patient is being seen for management of their BP along with the pt's other medical conditions. Today pt reports \"getting ready for therapy\" but she denies any F/C/CP/SOB/N/V/D/Abd pain.    Objective:  Vitals:    23 0745   BP: 177/85   Pulse: 87   Resp: 18   Temp: 36.7 °C (98.1 °F)   SpO2: 96%   reports \"still feeling weak\" but she        I/O last 3 completed shifts:  In: 322 (5.1 mL/kg) [P.O.:322]  Out: 200 (3.2 mL/kg) [Urine:200 (0.1 mL/kg/hr)]  Weight: 62.6 kg   I/O this shift:  In: 350 [P.O.:350]  Out: -   Pulmonary: RA  GENERAL: A  female who is  A&O x 2-3 and is following commands   HEENT: Normocephalic, atraumatic.  Pupils 2-4 mm OU.  Oral mucosa pink and moist without ulceration.   NECK: Supple, trachea is midline without bruits.   CARDIOVASCULAR: Regular rate and rhythm. S1, S2. No obvious S3, S4.   PULMONARY: Decreased breath sounds at the bases without rales or rhonchi .  GASTROINTESTINAL: Abdomen soft, nontender with positive bowel sounds are present.  EXTREMITIES:  Bilateral lower extremity 1/4 peripheral edema with pulses palpable throughout.   NEUROLOGIC: Cranial nerves II-XII grossly intact except for known chronic hearing and visual impairments. Muscle strength is 5/5 and DTRs are 2/4 throughout.There is no facial asymmetry and no resting tremors present  MUSCULOSKELETAL:There is left hip tenderness to palpation elicited.     LABS:  No results found for this or any previous visit (from " the past 24 hour(s)).     MEDICATIONS:  Scheduled medications  aspirin, 81 mg, oral, BID  atorvastatin, 80 mg, oral, Nightly  dilTIAZem CD, 180 mg, oral, Daily  donepezil, 10 mg, oral, Nightly  enoxaparin, 40 mg, subcutaneous, q24h  lidocaine, 1 patch, transdermal, Daily  losartan, 25 mg, oral, Daily  memantine, 5 mg, oral, Daily  metoprolol tartrate, 25 mg, oral, Daily  pantoprazole, 40 mg, oral, Daily before breakfast   Or  pantoprazole, 40 mg, intravenous, Daily before breakfast  ticagrelor, 90 mg, oral, BID      Continuous medications     PRN medications  PRN medications: acetaminophen **OR** acetaminophen **OR** acetaminophen, bisacodyl, HYDROcodone-acetaminophen    PERTINENT IMAGING STUDIES/PROCEDURES:    ECG 12 lead 12/20/2023  Normal sinus rhythm Nonspecific ST and T wave abnormality Abnormal ECG No previous ECGs available    XR chest 2 views   Final Result   No acute cardiopulmonary process.             Signed by: Waldo Waldrop 12/19/2023 3:25 PM   Dictation workstation:   [id number]       CT hip left wo IV contrast   Final Result   No evidence for acute fracture or dislocation. Moderate degenerative   change of the left hip joint.        Signed by: Waldo Waldrop 12/19/2023 4:29 PM   Dictation workstation:   [id number]       XR hip left with pelvis when performed 2 or 3 views   Final Result   No evidence for acute fracture or dislocation.        Mild-moderate degenerative change of the hip joints.        Signed by: Waldo Waldrop 12/19/2023 2:13 PM   Dictation workstation:   [id number]             XR chest 2 views   Final Result   No acute cardiopulmonary process.             Signed by: Waldo Waldrop 12/19/2023 3:25 PM   Dictation workstation:   [id number]       CT hip left wo IV contrast   Final Result   No evidence for acute fracture or dislocation. Moderate degenerative   change of the left hip joint.        Signed by: Waldo Waldrop 12/19/2023 4:29 PM   Dictation workstation:    [id number]       XR hip left with pelvis when performed 2 or 3 views   Final Result   No evidence for acute fracture or dislocation.        Mild-moderate degenerative change of the hip joints.        Signed by: Waldo Waldrop 12/19/2023 2:13 PM   Dictation workstation:   [id number]         Assessment:  Riya Hernandez is a 83 y.o. year-old female on admission for 2 days    82 yo female S/P fall  Worsening left hip pain due to above  Acute on chronic microcytic anemia  Essential hypertension  Hyperlipidemia  CAD with previous stent placements  Alzheimer's dementia  Osteoarthritis  Deconditioning and worsening mobility    MUSCULOSKELETAL:     1- Dr. C[place] of orthopedic surgery on the case and stated conservative management.  2-generalized weakness: PT&OT    CARDIOLOGY:  -BP is being watched closely on the patient's current medications (Lopressor, Cozaar and Cardizem)  with repeat labs reviewed from 12/20.    ID:  Pt continues to be off antibiotics    PSYCHIATRY/NEUROLOGY:   -Pt given 1 dose of oral Ativan on 12/19, discontinue benzodiazepine and start as needed Vistaril for anxiety.    GI:   Protonix     HEMATOLOGY:  -CBC noted with no need for transfusions unless pt develops severe bleeding, platelet counts less than 10,000 or Hgb becomes < 7.     OTHER:    -The patient continues to be on the rest of their chronic home medications for Alzheimer's dementia, osteoarthritis, etc.  Case discussed with patient's son once again on 12/21 and updated them of the patient's current condition as well as answered all of their questions to her satisfaction.  Approximately 50% time spent in discussing case with family   -Discharge planner is on the case with expected discharge to ECF soon once cleared by patient's insurance.    *CODE STATUS:Full     *DVT prophylaxis:  Lovenox     *PUD PROPHYLAXIS:  Protonix     Time spent managing patient's care is greater than 35 minutes with approximately 50% or more spent in  counseling and coordination of care.    Hector BERRY    Portions of this Progress note were dictated using MModal and reviewed . While every effort was made to correct mistranscriptions, minor grammatical or typographical errors may be present from machine dictation

## 2023-12-21 NOTE — PROGRESS NOTES
Spiritual Care Visit    Clinical Encounter Type  Visited With: Patient and family together  Routine Visit: Follow-up  Continue Visiting: Yes         Values/Beliefs  Spiritual Requests During Hospitalization: [place] Only. No Communion in the future per request by patient. Daughter understands this request too    Sacramental Encounters  Communion: Does not want communion  Communion Given Indicator: No     Fahad Negron

## 2023-12-21 NOTE — CARE PLAN
The patient's goals for the shift include  manage pain, safety.    The clinical goals for the shift include Safety, no falls, manage pain    No barriers to meeting these goals.       Problem: Pain  Goal: My pain/discomfort is manageable  Outcome: Progressing     Problem: Safety  Goal: Patient will be injury free during hospitalization  Outcome: Progressing  Goal: I will remain free of falls  Outcome: Progressing     Problem: Daily Care  Goal: Daily care needs are met  Outcome: Progressing     Problem: Psychosocial Needs  Goal: Demonstrates ability to cope with hospitalization/illness  Outcome: Progressing  Goal: Collaborate with me, my family, and caregiver to identify my specific goals  Outcome: Progressing     Problem: Discharge Barriers  Goal: My discharge needs are met  Outcome: Progressing     Problem: Skin  Goal: Decreased wound size/increased tissue granulation at next dressing change  Outcome: Progressing  Goal: Participates in plan/prevention/treatment measures  Outcome: Progressing  Goal: Prevent/manage excess moisture  Outcome: Progressing  Goal: Prevent/minimize sheer/friction injuries  Outcome: Progressing  Goal: Promote/optimize nutrition  Outcome: Progressing  Goal: Promote skin healing  Outcome: Progressing     Problem: Fall/Injury  Goal: Not fall by end of shift  Outcome: Progressing  Goal: Be free from injury by end of the shift  Outcome: Progressing  Goal: Verbalize understanding of personal risk factors for fall in the hospital  Outcome: Progressing  Goal: Verbalize understanding of risk factor reduction measures to prevent injury from fall in the home  Outcome: Progressing  Goal: Use assistive devices by end of the shift  Outcome: Progressing  Goal: Pace activities to prevent fatigue by end of the shift  Outcome: Progressing     Problem: Chronic Conditions and Co-morbidities  Goal: Patient's chronic conditions and co-morbidity symptoms are monitored and maintained or improved  Outcome:  Progressing

## 2023-12-21 NOTE — PROGRESS NOTES
Occupational Therapy    Evaluation    Patient Name: Riya Hernandez  MRN: [id number]  Today's Date: 12/21/2023  Time Calculation  Start Time: 0810  Stop Time: 0830  Time Calculation (min): 20 min      Assessment:  OT Assessment: 84 yo female presents with slightly impaired balance, activity tolerance, strength, and pain in left LE with mobility following a fall.  Patient would benefit from in-house OT to provide ADL retraining utilizing compensatory techniques and progressive strengthening to maximize safety with mobility and independence with self care tasks prior to discharge.  Prognosis: Good  Barriers to Discharge: Other (Comment) (24 hour supervision recommended d/t baseline cognitive deficits and risk for future falls)  Evaluation/Treatment Tolerance: Patient tolerated treatment well  Medical Staff Made Aware: Yes  End of Session Communication: Bedside nurse  End of Session Patient Position: Up in chair, Alarm on  OT Assessment Results: Decreased ADL status, Decreased safe judgment during ADL, Decreased cognition, Decreased endurance, Decreased IADLs, Decreased functional mobility  Prognosis: Good  Barriers to Discharge: Other (Comment) (24 hour supervision recommended d/t baseline cognitive deficits and risk for future falls)  Evaluation/Treatment Tolerance: Patient tolerated treatment well  Medical Staff Made Aware: Yes  Strengths: Attitude of self  Barriers to Participation: Ability to acquire knowledge    Plan:  OT Frequency: 3 times per week  OT Discharge Recommendations: Low intensity level of continued care, 24 hr supervision due to cognition  Equipment Recommended upon Discharge: Wheeled walker  OT Recommended Transfer Status: Assist of 1  OT - OK to Discharge: Yes       Subjective   Current Problem:  1. Fall as cause of accidental injury in home as place of occurrence, initial encounter        2. Left hip pain        3. Osteoarthritis of left hip, unspecified osteoarthritis type        4. Inability  "to walk          General:  General  Reason for Referral: Decline in ADLs s/p fall  Referred By: Dr Reese  Past Medical History Relevant to Rehab: dementia, CAD, HTN, HLD  Missed Visit: Yes  Missed Visit Reason: Cancel  Prior to Session Communication: Bedside nurse  Patient Position Received: Up in chair, Alarm on  Preferred Learning Style: verbal  General Comment:  (Patient cleared by Ortho and nursing for therapy and agreeable for same)    Precautions:  Hearing/Visual Limitations: corrective lenses for reading; hearing intact  LE Weight Bearing Status: Weight Bearing as Tolerated  Medical Precautions: Fall precautions    Pain:  Pain Assessment  Pain Assessment: 0-10  Pain Score: 6  Pain Type: Acute pain (s/p fall with mobility (none at rest))  Pain Location: Leg  Pain Orientation: Left    Objective     Cognition:  Overall Cognitive Status: Impaired at baseline  Orientation Level:  (oriented to person and setting)  Safety/Judgement: Exceptions to WFL     Home Living:  Type of Home: House  Lives With:  (Daughter and son-in-law)  Home Adaptive Equipment: Walker rolling or standard  Home Layout: One level  Home Access: Stairs to enter with rails  Entrance Stairs-Rails: Both  Entrance Stairs-Number of Steps:  (~3)  Bathroom Shower/Tub: Walk-in shower  Bathroom Equipment: Grab bars in shower, Other (Comment) (believes she has a seat)  Home Living Comments: patient provided home/intake information and is a questionable historian    Prior Function:  Level of [place]: Independent with ADLs and functional transfers  Homemaking Assistance: Needs assistance (family does all IADLs per patient)  Ambulatory Assistance: Independent (uses a walker \"as needed\" per patient)    ADL:  Eating Assistance: Independent  Grooming Assistance: Stand by  Grooming Deficit: Setup (to wash hands standing at sink)  Bathing Assistance: Stand by  Bathing Deficit: Setup (per clinical judgement)  UE Dressing Assistance: Stand by  UE Dressing " Deficit: Setup (per clinical judgement)  LE Dressing Assistance: Stand by  LE Dressing Deficit: Setup (donning left sock while seated in chair)  Toileting Assistance with Device: Stand by  Toileting Deficit: Supervison/safety    Activity Tolerance:  Endurance: Decreased tolerance for upright activites    Transfers  Transfer: Yes  Transfer 1  Transfer From 1: Chair with arms to  Transfer to 1:  (2 wheeled walker)  Technique 1: Sit to stand, Stand to sit  Transfer Level of Assistance 1: Close supervision (verbal cues for safe hand placement)  Trials/Comments 1: x 2  Transfers 2  Transfer Level of Assistance 2: Close supervision  Trials/Comments 2: on/off slightly elevated toilet with bilateral arm supports and cues for hand placement     Sensation:  Sensation Comment: Denies tingling/numbness    Strength:  Strength Comments: B UEs = ~4-/5 grossly    Outcome Measures:[facility] Daily Activity  Putting on and taking off regular lower body clothing: A little  Bathing (including washing, rinsing, drying): A little  Putting on and taking off regular upper body clothing: A little  Toileting, which includes using toilet, bedpan or urinal: A little  Taking care of personal grooming such as brushing teeth: None    OP EDUCATION:  Education  Individual(s) Educated: Patient  Education Provided: Fall precautons, Risk and benefits of OT discussed with patient or other, POC discussed and agreed upon  Risk and Benefits Discussed with Patient/Caregiver/Other: yes  Patient/Caregiver Demonstrated Understanding: yes  Plan of Care Discussed and Agreed Upon: yes  Patient Response to Education: Patient/Caregiver Verbalized Understanding of Information  Education Comment: Patient was receptive to therapy and instructions throughout.    Goals:  Encounter Problems       Encounter Problems (Active)       OT Goals       ADLs (Progressing)       Start:  12/21/23    Expected End:  12/28/23       Patient will complete bathing, toileting, and dressing  tasks with modified independence.         Functional transfers (Progressing)       Start:  12/21/23    Expected End:  12/28/23       Patient will complete bed, chair, and toilet transfers at a modified independent level from elevated seat heights and with bilateral arm supports as needed.         Safety (Progressing)       Start:  12/21/23    Expected End:  12/28/23       Patient will demonstrate safety with ADLs, transfers, and mobility using a 2 wheeled walker to reduce risk of falls.         Functional endurance (Progressing)       Start:  12/21/23    Expected End:  12/28/23       Patient will tolerate 20 minutes of therapeutic activity in order to increase functional endurance needed for ADLs.

## 2023-12-22 VITALS
HEIGHT: 63 IN | TEMPERATURE: 97.7 F | RESPIRATION RATE: 18 BRPM | DIASTOLIC BLOOD PRESSURE: 61 MMHG | OXYGEN SATURATION: 97 % | SYSTOLIC BLOOD PRESSURE: 102 MMHG | HEART RATE: 51 BPM | WEIGHT: 138 LBS | BODY MASS INDEX: 24.45 KG/M2

## 2023-12-22 PROCEDURE — 2500000004 HC RX 250 GENERAL PHARMACY W/ HCPCS (ALT 636 FOR OP/ED): Performed by: INTERNAL MEDICINE

## 2023-12-22 PROCEDURE — 97116 GAIT TRAINING THERAPY: CPT | Mod: GP,CQ

## 2023-12-22 PROCEDURE — S4991 NICOTINE PATCH NONLEGEND: HCPCS | Performed by: INTERNAL MEDICINE

## 2023-12-22 PROCEDURE — 2500000005 HC RX 250 GENERAL PHARMACY W/O HCPCS: Performed by: INTERNAL MEDICINE

## 2023-12-22 PROCEDURE — 2500000001 HC RX 250 WO HCPCS SELF ADMINISTERED DRUGS (ALT 637 FOR MEDICARE OP): Performed by: INTERNAL MEDICINE

## 2023-12-22 PROCEDURE — 97110 THERAPEUTIC EXERCISES: CPT | Mod: GP,CQ

## 2023-12-22 PROCEDURE — 2500000002 HC RX 250 W HCPCS SELF ADMINISTERED DRUGS (ALT 637 FOR MEDICARE OP, ALT 636 FOR OP/ED): Performed by: INTERNAL MEDICINE

## 2023-12-22 RX ORDER — LIDOCAINE 560 MG/1
1 PATCH PERCUTANEOUS; TOPICAL; TRANSDERMAL DAILY
Qty: 5 PATCH | Refills: 0 | Status: SHIPPED | OUTPATIENT
Start: 2023-12-23 | End: 2024-01-16 | Stop reason: ALTCHOICE

## 2023-12-22 RX ORDER — HYDROXYZINE PAMOATE 25 MG/1
25 CAPSULE ORAL EVERY 8 HOURS PRN
Qty: 30 CAPSULE | Refills: 0 | Status: SHIPPED | OUTPATIENT
Start: 2023-12-22

## 2023-12-22 RX ORDER — DILTIAZEM HYDROCHLORIDE 180 MG/1
180 CAPSULE, COATED, EXTENDED RELEASE ORAL DAILY
Qty: 30 CAPSULE | Refills: 0 | Status: SHIPPED | OUTPATIENT
Start: 2023-12-23 | End: 2024-01-16 | Stop reason: DRUGHIGH

## 2023-12-22 RX ADMIN — LIDOCAINE 1 PATCH: 4 PATCH TOPICAL at 09:09

## 2023-12-22 RX ADMIN — Medication 1 PATCH: at 09:08

## 2023-12-22 RX ADMIN — MEMANTINE 5 MG: 5 TABLET ORAL at 09:09

## 2023-12-22 RX ADMIN — ASPIRIN 81 MG CHEWABLE TABLET 81 MG: 81 TABLET CHEWABLE at 09:09

## 2023-12-22 RX ADMIN — TICAGRELOR 90 MG: 90 TABLET ORAL at 09:15

## 2023-12-22 RX ADMIN — DILTIAZEM HYDROCHLORIDE 180 MG: 180 CAPSULE, COATED, EXTENDED RELEASE ORAL at 09:09

## 2023-12-22 RX ADMIN — LOSARTAN POTASSIUM 25 MG: 50 TABLET, FILM COATED ORAL at 09:09

## 2023-12-22 RX ADMIN — METOPROLOL TARTRATE 25 MG: 25 TABLET, FILM COATED ORAL at 09:09

## 2023-12-22 ASSESSMENT — COGNITIVE AND FUNCTIONAL STATUS - GENERAL
CLIMB 3 TO 5 STEPS WITH RAILING: A LITTLE
STANDING UP FROM CHAIR USING ARMS: A LITTLE
MOVING TO AND FROM BED TO CHAIR: A LITTLE
WALKING IN HOSPITAL ROOM: A LITTLE
TURNING FROM BACK TO SIDE WHILE IN FLAT BAD: A LITTLE
MOVING FROM LYING ON BACK TO SITTING ON SIDE OF FLAT BED WITH BEDRAILS: A LITTLE
MOBILITY SCORE: 18

## 2023-12-22 ASSESSMENT — PAIN SCALES - GENERAL: PAINLEVEL_OUTOF10: 3

## 2023-12-22 ASSESSMENT — PAIN - FUNCTIONAL ASSESSMENT: PAIN_FUNCTIONAL_ASSESSMENT: 0-10

## 2023-12-22 NOTE — PROGRESS NOTES
Dc plan secured to [facility]. Dc order in and [place] completed. Transport is arranged and MSW informed both pt and son at bedside along with RN.    The Wheelchair Van you requested for Riya SYLVESTER in unit/room 450A on 12/22/2023 is scheduled to arrive at 2:00pm EST! [facility] is handling this ride and you can contact them at [phone].    Milli GRIFFITHSA, LSW

## 2023-12-22 NOTE — PROGRESS NOTES
DC plan [facility]. Will need final dc orders and [facility] CERTIFIED BY MD in order to obtain 7000 required by the state prior to SNF admit.    Will arrange transport once final orders are in. MSW called pt na [place] [phone] to update her. MSW will call her once we can arrange transport.     Milli Leroy [facility], LSW

## 2023-12-22 NOTE — DISCHARGE SUMMARY
[facility] MEDICINE  DISCHARGE SUMMARY   _______________________________________________________________________    NAME: Riya Hernandez MR#: [id number]   ATTENDING: Hector Reese DO  CSN#: [id number]   SEX: female ROOM: 450/[facility]   : 1940   DISCHDATE: 2023   ADMIT DATE: 2023       Admitting Physician: Hector Reese DO     Discharge Physician: Hector Reese DO    Consultants:  Anil Fournier MD-Orthopedic Surgeon        Admission Diagnoses:     Left hip pain [M25.552]  Inability to walk [R26.2]  Osteoarthritis of left hip, unspecified osteoarthritis type [M16.12]  Fall as cause of accidental injury in home as place of occurrence, initial encounter [W19.XXXA, Y92.009]    Discharge  Diagnoses:   82 yo female S/P fall  Worsening left hip pain due to above  Acute on chronic microcytic anemia  Essential hypertension  Hyperlipidemia  CAD with previous stent placements  Alzheimer's dementia  Osteoarthritis  Deconditioning and worsening mobility    Pertinent radiological studies/procedures:   ECG 12 lead 2023  Normal sinus rhythm Nonspecific ST and T wave abnormality Abnormal ECG No previous ECGs available     XR chest 2 views   Final Result   No acute cardiopulmonary process.             Signed by: Waldo Waldrop 2023 3:25 PM   Dictation workstation:   [id number]       CT hip left wo IV contrast   Final Result   No evidence for acute fracture or dislocation. Moderate degenerative   change of the left hip joint.        Signed by: Waldo Waldrop 2023 4:29 PM   Dictation workstation:   [id number]       XR hip left with pelvis when performed 2 or 3 views   Final Result   No evidence for acute fracture or dislocation.        Mild-moderate degenerative change of the hip joints.        Signed by: Waldo Waldrop 2023 2:13 PM   Dictation workstation:   [id number]             XR chest 2 views   Final Result   No acute cardiopulmonary process.              Signed by: Waldo Waldrop 12/19/2023 3:25 PM   Dictation workstation:   [id number]       CT hip left wo IV contrast   Final Result   No evidence for acute fracture or dislocation. Moderate degenerative   change of the left hip joint.        Signed by: Waldo Waldrop 12/19/2023 4:29 PM   Dictation workstation:   [id number]       XR hip left with pelvis when performed 2 or 3 views   Final Result   No evidence for acute fracture or dislocation.        Mild-moderate degenerative change of the hip joints.        Signed by: Waldo Waldrop 12/19/2023 2:13 PM   Dictation workstation:   [id number]     HISTORY OF PRESENT ILLNESS:  Riya Hernandez is a 83 y.o. year-old female with multiple medical conditions who presents to the ED on 12/19/2023 for worsening left hip pain. Family believes that she may have fallen last week.  Poor historian and she is unsure if she fell again today both falls were unwitnessed.  Patient denies any head trauma, palpitations or loss of consciousness during any of those falls but today has not been able to bear weight on her left leg.  She complains of pain in her left hip.  Denies chest pain shortness of breath no nausea vomiting or dizziness.  In the ED, imaging studies including CT of the leg showed no evidence of acute fractures or dislocations but patient was unable to ambulate when ER staff tried walking the patient. case with orthopedic surgeon Dr. Covington.  CT did not see an obvious fracture but patient is unable to ambulate would recommend admission to the hospital possible PT OT rehab and he would see the patient in consult believe that her pain is mostly related to arthritic changes.    Hospital Course:   Riya Hernandez is a 83 y.o. year-old  female who was admitted on 12/19/2023 for a fall about a week ago that led to intractable left hip pain and inability to ambulate.   Pt was followed this admission by orthopedic surgery.  Dr. Covington saw the patient  "and felt patient would not need any surgical intervention. The patient's blood pressure was initially low but this improved after the patient's BP medications were adjusted (Decreased Cardizem dose but kept same home doses of Cozaar and Lopressor). Pt did continue to have anemia but the patient had no evidence of bleeding nor did they require any transfusions. The patient's Hgb stabilized and pt's electrolyte abnormalities were also corrected. Patient also did not require any antibiotics during this admission and the patient was afebrile the last few days prior to discharge. Patient's mental status returned to baseline while the patient was at the hospital and the pt had no new neurological deficits. Patient overall did improve while recovering from their illness and the patient's pain was eventually controlled with oral pain medications.     The patient was then followed by physical therapy,occupational therapy and speech therapy during this hospitalization. It was felt patient would benefit going to an ECF facility. The patient was deemed safe for discharge to the ECF facility by myself and the specialist(s) on 12/22/2023.  Discharge Examination:   Blood pressure 143/68, pulse 65, temperature 36.9 °C (98.4 °F), temperature source Temporal, resp. rate 19, height 1.6 m (5' 3\"), weight 62.6 kg (138 lb), SpO2 99 %.  GENERAL: A  female who is  A&O x 2-3 and is following commands but confused at times.  HEENT: Normocephalic, atraumatic.  Pupils 2-4 mm OU.  Oral mucosa pink and moist without ulceration.   NECK: Supple, trachea is midline without bruits.   CARDIOVASCULAR: Regular rate and rhythm. S1, S2. No obvious S3, S4.   PULMONARY: Decreased breath sounds at the bases without rales or rhonchi .  GASTROINTESTINAL: Abdomen soft, nontender with positive bowel sounds are present.  EXTREMITIES:  Bilateral lower extremity 1/4 peripheral edema with pulses palpable throughout.   NEUROLOGIC: Cranial nerves II-XII " grossly intact except for known chronic hearing and visual impairments. Muscle strength is 5/5 and DTRs are 2/4 throughout.There is no facial asymmetry and no resting tremors present  MUSCULOSKELETAL:There is less lft hip tenderness to palpation elicited.   Laboratory Results:        Results for orders placed or performed during the hospital encounter of 12/19/23 (from the past 24 hour(s))   CBC and Auto Differential   Result Value Ref Range     WBC 9.7 4.4 - 11.3 x10*3/uL     nRBC 0.0 0.0 - 0.0 /100 WBCs     RBC 4.03 4.00 - 5.20 x10*6/uL     Hemoglobin 11.1 (L) 12.0 - 16.0 g/dL     Hematocrit 34.7 (L) 36.0 - 46.0 %     MCV 86 80 - 100 fL     MCH 27.5 26.0 - 34.0 pg     MCHC 32.0 32.0 - 36.0 g/dL     RDW 17.2 (H) 11.5 - 14.5 %     Platelets 344 150 - 450 x10*3/uL     Neutrophils % 75.0 40.0 - 80.0 %     Immature Granulocytes %, Automated 0.4 0.0 - 0.9 %     Lymphocytes % 14.5 13.0 - 44.0 %     Monocytes % 8.5 2.0 - 10.0 %     Eosinophils % 1.3 0.0 - 6.0 %     Basophils % 0.3 0.0 - 2.0 %     Neutrophils Absolute 7.26 (H) 1.60 - 5.50 x10*3/uL     Immature Granulocytes Absolute, Automated 0.04 0.00 - 0.50 x10*3/uL     Lymphocytes Absolute 1.40 0.80 - 3.00 x10*3/uL     Monocytes Absolute 0.82 (H) 0.05 - 0.80 x10*3/uL     Eosinophils Absolute 0.13 0.00 - 0.40 x10*3/uL     Basophils Absolute 0.03 0.00 - 0.10 x10*3/uL   Comprehensive metabolic panel   Result Value Ref Range     Glucose 91 65 - 99 mg/dL     Sodium 139 133 - 145 mmol/L     Potassium 4.2 3.4 - 5.1 mmol/L     Chloride 107 97 - 107 mmol/L     Bicarbonate 22 (L) 24 - 31 mmol/L     Urea Nitrogen 20 8 - 25 mg/dL     Creatinine 1.10 0.40 - 1.60 mg/dL     eGFR 49 (L) >60 mL/min/1.73m*2     Calcium 8.9 8.5 - 10.4 mg/dL     Albumin 3.7 3.5 - 5.0 g/dL     Alkaline Phosphatase 244 (H) 35 - 125 U/L     Total Protein 7.1 5.9 - 7.9 g/dL     AST 18 5 - 40 U/L     Bilirubin, Total 0.3 0.1 - 1.2 mg/dL     ALT 16 5 - 40 U/L     Anion Gap 10 <=19 mmol/L   Urinalysis with Reflex  Microscopic   Result Value Ref Range     Color, Urine Light-Yellow Light-Yellow, Yellow, Dark-Yellow     Appearance, Urine Turbid (N) Clear     Specific Gravity, Urine 1.014 1.005 - 1.035     pH, Urine 5.5 5.0, 5.5, 6.0, 6.5, 7.0, 7.5, 8.0     Protein, Urine NEGATIVE NEGATIVE, 10 (TRACE), 20 (TRACE) mg/dL     Glucose, Urine Normal Normal mg/dL     Blood, Urine NEGATIVE NEGATIVE     Ketones, Urine NEGATIVE NEGATIVE mg/dL     Bilirubin, Urine NEGATIVE NEGATIVE     Urobilinogen, Urine Normal Normal mg/dL     Nitrite, Urine NEGATIVE NEGATIVE     Leukocyte Esterase, Urine NEGATIVE NEGATIVE   SARS-CoV-2 RT PCR   Result Value Ref Range     Coronavirus 2019, PCR Not Detected Not Detected   Influenza A, and B PCR   Result Value Ref Range     Flu A Result Not Detected Not Detected     Flu B Result Not Detected Not Detected   ECG 12 lead   Result Value Ref Range     Ventricular Rate 71 BPM     Atrial Rate 71 BPM     KY Interval 176 ms     QRS Duration 76 ms     QT Interval 428 ms     QTC Calculation(Bazett) 465 ms     P Axis 47 degrees     R Axis 69 degrees     T Axis 50 degrees     QRS Count 11 beats     Q Onset 228 ms     P Onset 140 ms     P Offset 169 ms     T Offset 442 ms     QTC Fredericia 452 ms   Phosphorus   Result Value Ref Range     Phosphorus 4.2 2.5 - 4.5 mg/dL   Magnesium   Result Value Ref Range     Magnesium 2.20 1.60 - 3.10 mg/dL   Basic Metabolic Panel   Result Value Ref Range     Glucose 99 65 - 99 mg/dL     Sodium 141 133 - 145 mmol/L     Potassium 4.5 3.4 - 5.1 mmol/L     Chloride 110 (H) 97 - 107 mmol/L     Bicarbonate 22 (L) 24 - 31 mmol/L     Urea Nitrogen 23 8 - 25 mg/dL     Creatinine 1.10 0.40 - 1.60 mg/dL     eGFR 49 (L) >60 mL/min/1.73m*2     Calcium 8.8 8.5 - 10.4 mg/dL     Anion Gap 9 <=19 mmol/L   CBC   Result Value Ref Range     WBC 8.4 4.4 - 11.3 x10*3/uL     nRBC 0.0 0.0 - 0.0 /100 WBCs     RBC 3.87 (L) 4.00 - 5.20 x10*6/uL     Hemoglobin 10.8 (L) 12.0 - 16.0 g/dL     Hematocrit 33.5 (L)  36.0 - 46.0 %     MCV 87 80 - 100 fL     MCH 27.9 26.0 - 34.0 pg     MCHC 32.2 32.0 - 36.0 g/dL     RDW 17.2 (H) 11.5 - 14.5 %     Platelets 312 150 - 450 x10*3/uL      MEDICATIONS:  Patient Instructions:     PLEASE FOLLOW-UP WITH:  No Assigned Pcp Generic Provider, MD Le[facility] [address]  [place] [id number]            Activity: as instructed by PT and OT  Diet: Cardiac  Wound Care: As instructed       Your medication list        START taking these medications        Instructions Last Dose Given Next Dose Due   hydrOXYzine pamoate 25 mg capsule  Commonly known as: Vistaril      Take 1 capsule (25 mg) by mouth every 8 hours if needed for itching.       lidocaine 4 % patch  Start taking on: December 23, 2023      Place 1 patch over 12 hours on the skin once daily. Remove & discard patch within 12 hours or as directed by MD. Do not start before December 23, 2023.              CHANGE how you take these medications        Instructions Last Dose Given Next Dose Due   dilTIAZem  mg 24 hr capsule  Commonly known as: Cardizem CD  Start taking on: December 23, 2023  What changed:   medication strength  how much to take      Take 1 capsule (180 mg) by mouth once daily. Do not start before December 23, 2023.              CONTINUE taking these medications        Instructions Last Dose Given Next Dose Due   aspirin 81 mg chewable tablet           atorvastatin 80 mg tablet  Commonly known as: Lipitor           Brilinta 90 mg tablet  Generic drug: ticagrelor           donepezil 10 mg tablet  Commonly known as: Aricept           losartan 25 mg tablet  Commonly known as: Cozaar           memantine 10 mg tablet  Commonly known as: Namenda           metoprolol tartrate 25 mg tablet  Commonly known as: Lopressor                     Where to Get Your Medications        These medications were sent to [id number] - [place], [place] - [address] AT [address]  [address] [place] [address] [facility] [address]       Phone: [phone]   dilTIAZem  mg 24 hr capsule  hydrOXYzine pamoate 25 mg capsule  lidocaine 4 % patch       CODE STATUS UPON DISCHARGE: FULL CODE    DISCHARGE INSTRUCTIONS:  The patient is being transferred to an ECF facility and the ECF doctor will assume the pt's care upon arrival. Pt was asked to follow up with their family doctor after discharge from subacute rehab and with orthopedic surgeon as instructed.  The pt will need a repeat of their CBC and BMP when they see the ECF doctor.  I will leave it up to the ECF doctor to dose the BP medications accordingly and to manage the patient's other chronic medical conditions in an outpatient setting. The patient was advised if their symptoms persist, to call their doctor or to go to the ED. The patient and their family understands events of their hospitalization and all their questions were answered.     Time spent with patient care,coordinating care with the treatment team, medication reconciliation of multiple medications and going over the discharge plan of care with the patient's family was 29 minutes.    Signed:    Hector Reese D.O.  12/22/2023    Portions of this note were dictated using [employer] and reviewed . While every effort was made to correct mistranscriptions, minor grammatical or typographical errors may be present from machine dictation

## 2023-12-22 NOTE — CARE PLAN
The patient's goals for the shift include  safety    The clinical goals for the shift include Safety, no falls, manage pain    Problem: Pain  Goal: My pain/discomfort is manageable  Outcome: Progressing     Problem: Safety  Goal: Patient will be injury free during hospitalization  Outcome: Progressing  Goal: I will remain free of falls  Outcome: Progressing     Problem: Daily Care  Goal: Daily care needs are met  Outcome: Progressing     Problem: Psychosocial Needs  Goal: Demonstrates ability to cope with hospitalization/illness  Outcome: Progressing  Goal: Collaborate with me, my family, and caregiver to identify my specific goals  Outcome: Progressing     Problem: Discharge Barriers  Goal: My discharge needs are met  Outcome: Progressing     Problem: Skin  Goal: Decreased wound size/increased tissue granulation at next dressing change  Outcome: Progressing  Goal: Participates in plan/prevention/treatment measures  Outcome: Progressing  Goal: Prevent/manage excess moisture  Outcome: Progressing  Goal: Prevent/minimize sheer/friction injuries  Outcome: Progressing  Goal: Promote/optimize nutrition  Outcome: Progressing  Goal: Promote skin healing  Outcome: Progressing     Problem: Fall/Injury  Goal: Not fall by end of shift  Outcome: Progressing  Goal: Be free from injury by end of the shift  Outcome: Progressing  Goal: Verbalize understanding of personal risk factors for fall in the hospital  Outcome: Progressing  Goal: Verbalize understanding of risk factor reduction measures to prevent injury from fall in the home  Outcome: Progressing  Goal: Use assistive devices by end of the shift  Outcome: Progressing  Goal: Pace activities to prevent fatigue by end of the shift  Outcome: Progressing     Problem: Chronic Conditions and Co-morbidities  Goal: Patient's chronic conditions and co-morbidity symptoms are monitored and maintained or improved  Outcome: Progressing

## 2024-01-04 ENCOUNTER — APPOINTMENT (OUTPATIENT)
Dept: PRIMARY CARE | Facility: CLINIC | Age: 84
End: 2024-01-04
Payer: COMMERCIAL

## 2024-01-05 ENCOUNTER — TELEPHONE (OUTPATIENT)
Dept: PRIMARY CARE | Facility: CLINIC | Age: 84
End: 2024-01-05
Payer: COMMERCIAL

## 2024-01-05 NOTE — TELEPHONE ENCOUNTER
----- Message from Trini Regan MD sent at 1/4/2024  1:47 PM EST -----  Ok to do as long as patient establishes  ----- Message -----  From: Lien Cool MA  Sent: 1/4/2024  10:05 AM EST  To: Trini Regan MD    Patient is discharging from [facility] today and [facility] said they needed verbal orders for pt, ot, and skilled nursing, pt. Has arthritis of left hip. She is coming in on 01/11/24. [phone] ([facility])

## 2024-01-05 NOTE — TELEPHONE ENCOUNTER
spoke to [facility] and told them the orders will be okay as long as the patient establishes with Dr. Regan.

## 2024-01-11 ENCOUNTER — OFFICE VISIT (OUTPATIENT)
Dept: PRIMARY CARE | Facility: CLINIC | Age: 84
End: 2024-01-11
Payer: MEDICARE

## 2024-01-11 VITALS — SYSTOLIC BLOOD PRESSURE: 114 MMHG | DIASTOLIC BLOOD PRESSURE: 64 MMHG

## 2024-01-11 DIAGNOSIS — I10 ESSENTIAL HYPERTENSION: Primary | ICD-10-CM

## 2024-01-11 DIAGNOSIS — M25.552 LEFT HIP PAIN: ICD-10-CM

## 2024-01-11 DIAGNOSIS — E43 UNSPECIFIED SEVERE PROTEIN-CALORIE MALNUTRITION (MULTI): ICD-10-CM

## 2024-01-11 DIAGNOSIS — K21.9 GASTROESOPHAGEAL REFLUX DISEASE WITHOUT ESOPHAGITIS: ICD-10-CM
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
## 2024-01-11 DIAGNOSIS — M16.12 PRIMARY OSTEOARTHRITIS OF LEFT HIP: ICD-10-CM

## 2024-01-11 DIAGNOSIS — E78.2 MIXED HYPERLIPIDEMIA: ICD-10-CM

## 2024-01-11 PROBLEM — K57.30 DIVERTICULOSIS OF COLON: Status: ACTIVE | Noted: 2024-01-11

## 2024-01-11 PROBLEM — K86.2 CYST OF PANCREAS (HHS-HCC): Status: ACTIVE | Noted: 2017-01-01

## 2024-01-11 PROBLEM — M51.36 DDD (DEGENERATIVE DISC DISEASE), LUMBAR: Status: ACTIVE | Noted: 2024-01-11

## 2024-01-11 PROBLEM — M51.369 DDD (DEGENERATIVE DISC DISEASE), LUMBAR: Status: ACTIVE | Noted: 2024-01-11

## 2024-01-11 PROBLEM — D64.9 ANEMIA: Status: ACTIVE | Noted: 2024-01-11

## 2024-01-11 PROBLEM — E55.9 VITAMIN D DEFICIENCY: Status: ACTIVE | Noted: 2017-06-13

## 2024-01-11 PROBLEM — K86.9 PANCREATIC LESION (HHS-HCC): Status: ACTIVE | Noted: 2017-03-21

## 2024-01-11 PROCEDURE — 1159F MED LIST DOCD IN RCRD: CPT | Performed by: INTERNAL MEDICINE

## 2024-01-11 PROCEDURE — 1126F AMNT PAIN NOTED NONE PRSNT: CPT | Performed by: INTERNAL MEDICINE

## 2024-01-11 PROCEDURE — 1111F DSCHRG MED/CURRENT MED MERGE: CPT | Performed by: INTERNAL MEDICINE

## 2024-01-11 PROCEDURE — 3074F SYST BP LT 130 MM HG: CPT | Performed by: INTERNAL MEDICINE

## 2024-01-11 PROCEDURE — 3078F DIAST BP <80 MM HG: CPT | Performed by: INTERNAL MEDICINE

## 2024-01-11 PROCEDURE — 99204 OFFICE O/P NEW MOD 45 MIN: CPT | Performed by: INTERNAL MEDICINE

## 2024-01-11 RX ORDER — DILTIAZEM HYDROCHLORIDE 240 MG/1
240 CAPSULE, COATED, EXTENDED RELEASE ORAL DAILY
COMMUNITY

## 2024-01-11 ASSESSMENT — ENCOUNTER SYMPTOMS
LOSS OF SENSATION IN FEET: 0
OCCASIONAL FEELINGS OF UNSTEADINESS: 1
DEPRESSION: 0

## 2024-01-11 NOTE — PROGRESS NOTES
Subjective   Patient ID: Riya Hernandez is a 83 y.o. female who presents for New Patient Visit.    HPI   To establish PCP  Hospital follow up  Patient was admitted at [facility] for worsening left hip pain.  Possibility of a fall.  Patient is a poor historian.  CT did not show any fracture.  Blood pressure was low but better with adjustment H&H was stable.  Patient has not required any antibiotics    Past medical history: Coronary artery disease s/p stent, MI, Alzheimer's dementia, osteoarthritis left hip, hypertension, high cholesterol, D&C  Social history: Lives with daughter who smokes 3 to 5 cigarettes a day no drinking no drugs  Family history: Father colon cancer mother is 70 coronary artery disease, brother MI at age of 49 and  grandmother with dementia CABG      Review of Systems    Objective   /64     Physical Exam  Vitals reviewed.   Constitutional:       Appearance: Normal appearance.   HENT:      Head: Normocephalic and atraumatic.      Right Ear: Tympanic membrane, ear canal and external ear normal.      Left Ear: Tympanic membrane, ear canal and external ear normal.      Nose: Nose normal.      Mouth/Throat:      Pharynx: Oropharynx is clear.   Eyes:      Extraocular Movements: Extraocular movements intact.      Conjunctiva/sclera: Conjunctivae normal.      Pupils: Pupils are equal, round, and reactive to light.   Cardiovascular:      Rate and Rhythm: Normal rate and regular rhythm.      Pulses: Normal pulses.      Heart sounds: Normal heart sounds.   Pulmonary:      Effort: Pulmonary effort is normal.      Breath sounds: Normal breath sounds.   Abdominal:      General: Abdomen is flat. Bowel sounds are normal.      Palpations: Abdomen is soft.   Musculoskeletal:         General: Tenderness present.      Cervical back: Normal range of motion and neck supple.      Comments: Tenderness left hip   Skin:     General: Skin is warm and dry.   Neurological:      Mental Status: She is alert. Mental  status is at baseline.   Psychiatric:         Mood and Affect: Mood normal.         Assessment/Plan   Problem List Items Addressed This Visit             ICD-10-CM       Cardiac and Vasculature    Essential hypertension - Primary I10    Relevant Orders    CBC    Comprehensive Metabolic Panel    Lipid Panel    Vitamin B12    Vitamin D 25-Hydroxy,Total (for eval of Vitamin D levels)    Hyperlipidemia E78.5       Gastrointestinal and Abdominal    Esophageal reflux K21.9       Musculoskeletal and Injuries    Left hip pain M25.552    Osteoarthritis of left hip M16.12    Relevant Orders    Vitamin D 25-Hydroxy,Total (for eval of Vitamin D levels)    Referral to Orthopaedic Surgery    Referral to Pain Medicine       Other    Fall as cause of accidental injury in home as place of occurrence, initial encounter W19.XXXA, Y92.009     Other Visit Diagnoses         Codes    Unspecified severe protein-calorie malnutrition (CMS/HCC)     E43    Relevant Orders    Vitamin D 25-Hydroxy,Total (for eval of Vitamin D levels)        Patient's hospital chart reviewed  Labs reviewed  Blood pressure stable  Mild anemia probably nutritional deficiency start vitamins  For hip pain refer to orthopedic  Refer to pain management if nerve block is an option  Dr. Rowland retired goes to Dr. Bright for cardiology  Blood work ordered to be done in a month follow-up in a month

## 2024-07-05 ENCOUNTER — APPOINTMENT (OUTPATIENT)
Dept: RADIOLOGY | Facility: HOSPITAL | Age: 84
DRG: 565 | End: 2024-07-05
Payer: MEDICARE

## 2024-07-05 ENCOUNTER — APPOINTMENT (OUTPATIENT)
Dept: CARDIOLOGY | Facility: HOSPITAL | Age: 84
DRG: 565 | End: 2024-07-05
Payer: MEDICARE

## 2024-07-05 ENCOUNTER — HOSPITAL ENCOUNTER (INPATIENT)
Facility: HOSPITAL | Age: 84
DRG: 565 | End: 2024-07-05
Attending: STUDENT IN AN ORGANIZED HEALTH CARE EDUCATION/TRAINING PROGRAM | Admitting: INTERNAL MEDICINE
Payer: MEDICARE

## 2024-07-05 ENCOUNTER — APPOINTMENT (OUTPATIENT)
Dept: PRIMARY CARE | Facility: CLINIC | Age: 84
End: 2024-07-05
Payer: MEDICARE
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
## 2024-07-05 DIAGNOSIS — R42 FEELS AS THOUGH WILL FALL: ICD-10-CM

## 2024-07-05 DIAGNOSIS — R55 SYNCOPE, UNSPECIFIED SYNCOPE TYPE: ICD-10-CM

## 2024-07-05 DIAGNOSIS — R74.8 ELEVATED CREATINE KINASE LEVEL: ICD-10-CM

## 2024-07-05 PROBLEM — R41.0 CONFUSION: Status: ACTIVE | Noted: 2024-07-05

## 2024-07-05 PROBLEM — N39.0 UTI (URINARY TRACT INFECTION): Status: ACTIVE | Noted: 2024-07-05

## 2024-07-05 PROBLEM — T79.6XXA TRAUMATIC RHABDOMYOLYSIS (CMS-HCC): Status: ACTIVE | Noted: 2024-07-05

## 2024-07-05 LAB
ALBUMIN SERPL-MCNC: 3.5 G/DL (ref 3.5–5)
ALP BLD-CCNC: 265 U/L (ref 35–125)
ALT SERPL-CCNC: 18 U/L (ref 5–40)
ANION GAP SERPL CALC-SCNC: 13 MMOL/L
APPEARANCE UR: ABNORMAL
AST SERPL-CCNC: 40 U/L (ref 5–40)
ATRIAL RATE: 84 BPM
BACTERIA #/AREA URNS AUTO: ABNORMAL /HPF
BASOPHILS # BLD AUTO: 0.02 X10*3/UL (ref 0–0.1)
BASOPHILS NFR BLD AUTO: 0.2 %
BILIRUB DIRECT SERPL-MCNC: <0.2 MG/DL (ref 0–0.2)
BILIRUB SERPL-MCNC: 0.4 MG/DL (ref 0.1–1.2)
BILIRUB UR STRIP.AUTO-MCNC: NEGATIVE MG/DL
BUN SERPL-MCNC: 23 MG/DL (ref 8–25)
CALCIUM SERPL-MCNC: 9.1 MG/DL (ref 8.5–10.4)
CHLORIDE SERPL-SCNC: 106 MMOL/L (ref 97–107)
CK SERPL-CCNC: 1536 U/L (ref 24–195)
CO2 SERPL-SCNC: 20 MMOL/L (ref 24–31)
COLOR UR: YELLOW
CREAT SERPL-MCNC: 1.1 MG/DL (ref 0.4–1.6)
EGFRCR SERPLBLD CKD-EPI 2021: 49 ML/MIN/1.73M*2
EOSINOPHIL # BLD AUTO: 0.01 X10*3/UL (ref 0–0.4)
EOSINOPHIL NFR BLD AUTO: 0.1 %
ERYTHROCYTE [DISTWIDTH] IN BLOOD BY AUTOMATED COUNT: 13.7 % (ref 11.5–14.5)
GLUCOSE SERPL-MCNC: 119 MG/DL (ref 65–99)
GLUCOSE UR STRIP.AUTO-MCNC: NORMAL MG/DL
HCT VFR BLD AUTO: 39 % (ref 36–46)
HGB BLD-MCNC: 12.6 G/DL (ref 12–16)
IMM GRANULOCYTES # BLD AUTO: 0.07 X10*3/UL (ref 0–0.5)
IMM GRANULOCYTES NFR BLD AUTO: 0.5 % (ref 0–0.9)
KETONES UR STRIP.AUTO-MCNC: NEGATIVE MG/DL
LEUKOCYTE ESTERASE UR QL STRIP.AUTO: ABNORMAL
LIPASE SERPL-CCNC: 26 U/L (ref 16–63)
LYMPHOCYTES # BLD AUTO: 0.63 X10*3/UL (ref 0.8–3)
LYMPHOCYTES NFR BLD AUTO: 4.9 %
MCH RBC QN AUTO: 31 PG (ref 26–34)
MCHC RBC AUTO-ENTMCNC: 32.3 G/DL (ref 32–36)
MCV RBC AUTO: 96 FL (ref 80–100)
MONOCYTES # BLD AUTO: 1.26 X10*3/UL (ref 0.05–0.8)
MONOCYTES NFR BLD AUTO: 9.8 %
MUCOUS THREADS #/AREA URNS AUTO: ABNORMAL /LPF
NEUTROPHILS # BLD AUTO: 10.83 X10*3/UL (ref 1.6–5.5)
NEUTROPHILS NFR BLD AUTO: 84.5 %
NITRITE UR QL STRIP.AUTO: ABNORMAL
NRBC BLD-RTO: 0 /100 WBCS (ref 0–0)
P AXIS: 5 DEGREES
P OFFSET: 197 MS
P ONSET: 148 MS
PH UR STRIP.AUTO: 6 [PH]
PLATELET # BLD AUTO: 310 X10*3/UL (ref 150–450)
POTASSIUM SERPL-SCNC: 3.7 MMOL/L (ref 3.4–5.1)
PR INTERVAL: 162 MS
PROT SERPL-MCNC: 7.3 G/DL (ref 5.9–7.9)
PROT UR STRIP.AUTO-MCNC: ABNORMAL MG/DL
Q ONSET: 229 MS
QRS COUNT: 14 BEATS
QRS DURATION: 80 MS
QT INTERVAL: 372 MS
QTC CALCULATION(BAZETT): 439 MS
QTC FREDERICIA: 416 MS
R AXIS: 83 DEGREES
RBC # BLD AUTO: 4.07 X10*6/UL (ref 4–5.2)
RBC # UR STRIP.AUTO: ABNORMAL /UL
RBC #/AREA URNS AUTO: >20 /HPF
SODIUM SERPL-SCNC: 139 MMOL/L (ref 133–145)
SP GR UR STRIP.AUTO: 1.02
SQUAMOUS #/AREA URNS AUTO: ABNORMAL /HPF
T AXIS: 29 DEGREES
T OFFSET: 415 MS
UROBILINOGEN UR STRIP.AUTO-MCNC: NORMAL MG/DL
VENTRICULAR RATE: 84 BPM
WBC # BLD AUTO: 12.8 X10*3/UL (ref 4.4–11.3)
WBC #/AREA URNS AUTO: >50 /HPF
WBC CLUMPS #/AREA URNS AUTO: ABNORMAL /HPF

## 2024-07-05 PROCEDURE — 90715 TDAP VACCINE 7 YRS/> IM: CPT | Performed by: PHYSICIAN ASSISTANT

## 2024-07-05 PROCEDURE — 93005 ELECTROCARDIOGRAM TRACING: CPT

## 2024-07-05 PROCEDURE — 70450 CT HEAD/BRAIN W/O DYE: CPT

## 2024-07-05 PROCEDURE — 81001 URINALYSIS AUTO W/SCOPE: CPT | Performed by: PHYSICIAN ASSISTANT

## 2024-07-05 PROCEDURE — 83690 ASSAY OF LIPASE: CPT | Performed by: STUDENT IN AN ORGANIZED HEALTH CARE EDUCATION/TRAINING PROGRAM

## 2024-07-05 PROCEDURE — 82248 BILIRUBIN DIRECT: CPT | Performed by: STUDENT IN AN ORGANIZED HEALTH CARE EDUCATION/TRAINING PROGRAM

## 2024-07-05 PROCEDURE — 70450 CT HEAD/BRAIN W/O DYE: CPT | Performed by: STUDENT IN AN ORGANIZED HEALTH CARE EDUCATION/TRAINING PROGRAM

## 2024-07-05 PROCEDURE — 85025 COMPLETE CBC W/AUTO DIFF WBC: CPT | Performed by: PHYSICIAN ASSISTANT

## 2024-07-05 PROCEDURE — 72125 CT NECK SPINE W/O DYE: CPT

## 2024-07-05 PROCEDURE — 80048 BASIC METABOLIC PNL TOTAL CA: CPT | Performed by: PHYSICIAN ASSISTANT

## 2024-07-05 PROCEDURE — 2500000001 HC RX 250 WO HCPCS SELF ADMINISTERED DRUGS (ALT 637 FOR MEDICARE OP): Performed by: INTERNAL MEDICINE

## 2024-07-05 PROCEDURE — 72125 CT NECK SPINE W/O DYE: CPT | Performed by: STUDENT IN AN ORGANIZED HEALTH CARE EDUCATION/TRAINING PROGRAM

## 2024-07-05 PROCEDURE — 1100000001 HC PRIVATE ROOM DAILY

## 2024-07-05 PROCEDURE — 2500000004 HC RX 250 GENERAL PHARMACY W/ HCPCS (ALT 636 FOR OP/ED): Performed by: INTERNAL MEDICINE

## 2024-07-05 PROCEDURE — 93880 EXTRACRANIAL BILAT STUDY: CPT | Performed by: RADIOLOGY

## 2024-07-05 PROCEDURE — 99285 EMERGENCY DEPT VISIT HI MDM: CPT | Mod: 25

## 2024-07-05 PROCEDURE — 87086 URINE CULTURE/COLONY COUNT: CPT | Mod: TRILAB,WESLAB | Performed by: PHYSICIAN ASSISTANT

## 2024-07-05 PROCEDURE — 99222 1ST HOSP IP/OBS MODERATE 55: CPT | Performed by: INTERNAL MEDICINE

## 2024-07-05 PROCEDURE — 36415 COLL VENOUS BLD VENIPUNCTURE: CPT | Performed by: PHYSICIAN ASSISTANT

## 2024-07-05 PROCEDURE — 82550 ASSAY OF CK (CPK): CPT | Performed by: STUDENT IN AN ORGANIZED HEALTH CARE EDUCATION/TRAINING PROGRAM

## 2024-07-05 PROCEDURE — 2500000002 HC RX 250 W HCPCS SELF ADMINISTERED DRUGS (ALT 637 FOR MEDICARE OP, ALT 636 FOR OP/ED): Performed by: INTERNAL MEDICINE

## 2024-07-05 PROCEDURE — 71045 X-RAY EXAM CHEST 1 VIEW: CPT

## 2024-07-05 PROCEDURE — 2500000004 HC RX 250 GENERAL PHARMACY W/ HCPCS (ALT 636 FOR OP/ED): Performed by: PHYSICIAN ASSISTANT

## 2024-07-05 PROCEDURE — 71045 X-RAY EXAM CHEST 1 VIEW: CPT | Performed by: RADIOLOGY

## 2024-07-05 PROCEDURE — 90471 IMMUNIZATION ADMIN: CPT | Performed by: PHYSICIAN ASSISTANT

## 2024-07-05 PROCEDURE — 73564 X-RAY EXAM KNEE 4 OR MORE: CPT | Mod: LEFT SIDE | Performed by: RADIOLOGY

## 2024-07-05 PROCEDURE — 93880 EXTRACRANIAL BILAT STUDY: CPT

## 2024-07-05 PROCEDURE — 73564 X-RAY EXAM KNEE 4 OR MORE: CPT | Mod: LT

## 2024-07-05 RX ORDER — ENOXAPARIN SODIUM 100 MG/ML
40 INJECTION SUBCUTANEOUS EVERY 24 HOURS
Status: DISCONTINUED | OUTPATIENT
Start: 2024-07-05 | End: 2024-07-08 | Stop reason: HOSPADM

## 2024-07-05 RX ORDER — ACETAMINOPHEN 325 MG/1
650 TABLET ORAL ONCE
Status: COMPLETED | OUTPATIENT
Start: 2024-07-05 | End: 2024-07-05

## 2024-07-05 RX ORDER — POLYETHYLENE GLYCOL 3350 17 G/17G
17 POWDER, FOR SOLUTION ORAL DAILY
Status: DISCONTINUED | OUTPATIENT
Start: 2024-07-05 | End: 2024-07-08 | Stop reason: HOSPADM

## 2024-07-05 RX ORDER — LOSARTAN POTASSIUM 25 MG/1
25 TABLET ORAL DAILY
Status: DISCONTINUED | OUTPATIENT
Start: 2024-07-05 | End: 2024-07-08 | Stop reason: HOSPADM

## 2024-07-05 RX ORDER — ONDANSETRON 4 MG/1
4 TABLET, FILM COATED ORAL EVERY 8 HOURS PRN
Status: DISCONTINUED | OUTPATIENT
Start: 2024-07-05 | End: 2024-07-05

## 2024-07-05 RX ORDER — GUAIFENESIN 600 MG/1
600 TABLET, EXTENDED RELEASE ORAL EVERY 12 HOURS PRN
Status: DISCONTINUED | OUTPATIENT
Start: 2024-07-05 | End: 2024-07-08 | Stop reason: HOSPADM

## 2024-07-05 RX ORDER — METOPROLOL TARTRATE 25 MG/1
25 TABLET, FILM COATED ORAL DAILY
Status: DISCONTINUED | OUTPATIENT
Start: 2024-07-05 | End: 2024-07-08 | Stop reason: HOSPADM

## 2024-07-05 RX ORDER — DONEPEZIL HYDROCHLORIDE 10 MG/1
10 TABLET, FILM COATED ORAL NIGHTLY
Status: DISCONTINUED | OUTPATIENT
Start: 2024-07-05 | End: 2024-07-08 | Stop reason: HOSPADM

## 2024-07-05 RX ORDER — MEMANTINE HYDROCHLORIDE 5 MG/1
5 TABLET ORAL DAILY
Status: DISCONTINUED | OUTPATIENT
Start: 2024-07-05 | End: 2024-07-08 | Stop reason: HOSPADM

## 2024-07-05 RX ORDER — HYDROXYZINE PAMOATE 25 MG/1
25 CAPSULE ORAL EVERY 8 HOURS PRN
Status: DISCONTINUED | OUTPATIENT
Start: 2024-07-05 | End: 2024-07-08 | Stop reason: HOSPADM

## 2024-07-05 RX ORDER — TALC
3 POWDER (GRAM) TOPICAL NIGHTLY PRN
Status: DISCONTINUED | OUTPATIENT
Start: 2024-07-05 | End: 2024-07-08 | Stop reason: HOSPADM

## 2024-07-05 RX ORDER — DOCUSATE SODIUM 100 MG/1
100 CAPSULE, LIQUID FILLED ORAL 2 TIMES DAILY
Status: DISCONTINUED | OUTPATIENT
Start: 2024-07-05 | End: 2024-07-08 | Stop reason: HOSPADM

## 2024-07-05 RX ORDER — ACETAMINOPHEN 650 MG/1
650 SUPPOSITORY RECTAL EVERY 4 HOURS PRN
Status: DISCONTINUED | OUTPATIENT
Start: 2024-07-05 | End: 2024-07-08 | Stop reason: HOSPADM

## 2024-07-05 RX ORDER — GUAIFENESIN/DEXTROMETHORPHAN 100-10MG/5
5 SYRUP ORAL EVERY 4 HOURS PRN
Status: DISCONTINUED | OUTPATIENT
Start: 2024-07-05 | End: 2024-07-08 | Stop reason: HOSPADM

## 2024-07-05 RX ORDER — ONDANSETRON HYDROCHLORIDE 2 MG/ML
4 INJECTION, SOLUTION INTRAVENOUS EVERY 8 HOURS PRN
Status: DISCONTINUED | OUTPATIENT
Start: 2024-07-05 | End: 2024-07-08 | Stop reason: HOSPADM

## 2024-07-05 RX ORDER — DILTIAZEM HYDROCHLORIDE 240 MG/1
240 CAPSULE, COATED, EXTENDED RELEASE ORAL DAILY
Status: DISCONTINUED | OUTPATIENT
Start: 2024-07-05 | End: 2024-07-08 | Stop reason: HOSPADM

## 2024-07-05 RX ORDER — ACETAMINOPHEN 325 MG/1
650 TABLET ORAL EVERY 4 HOURS PRN
Status: DISCONTINUED | OUTPATIENT
Start: 2024-07-05 | End: 2024-07-08 | Stop reason: HOSPADM

## 2024-07-05 RX ORDER — NAPROXEN SODIUM 220 MG/1
81 TABLET, FILM COATED ORAL 2 TIMES DAILY
Status: DISCONTINUED | OUTPATIENT
Start: 2024-07-05 | End: 2024-07-08 | Stop reason: HOSPADM

## 2024-07-05 RX ORDER — ONDANSETRON 4 MG/1
4 TABLET, ORALLY DISINTEGRATING ORAL EVERY 8 HOURS PRN
Status: DISCONTINUED | OUTPATIENT
Start: 2024-07-05 | End: 2024-07-08 | Stop reason: HOSPADM

## 2024-07-05 RX ORDER — CEFTRIAXONE 1 G/50ML
1 INJECTION, SOLUTION INTRAVENOUS EVERY 24 HOURS
Status: DISCONTINUED | OUTPATIENT
Start: 2024-07-05 | End: 2024-07-08 | Stop reason: HOSPADM

## 2024-07-05 RX ORDER — PANTOPRAZOLE SODIUM 40 MG/1
40 TABLET, DELAYED RELEASE ORAL
Status: DISCONTINUED | OUTPATIENT
Start: 2024-07-06 | End: 2024-07-08 | Stop reason: HOSPADM

## 2024-07-05 RX ORDER — DEXTROSE MONOHYDRATE, SODIUM CHLORIDE, AND POTASSIUM CHLORIDE 50; 1.49; 4.5 G/1000ML; G/1000ML; G/1000ML
100 INJECTION, SOLUTION INTRAVENOUS CONTINUOUS
Status: DISCONTINUED | OUTPATIENT
Start: 2024-07-05 | End: 2024-07-07

## 2024-07-05 RX ORDER — ATORVASTATIN CALCIUM 80 MG/1
80 TABLET, FILM COATED ORAL NIGHTLY
Status: DISCONTINUED | OUTPATIENT
Start: 2024-07-05 | End: 2024-07-08 | Stop reason: HOSPADM

## 2024-07-05 RX ORDER — PANTOPRAZOLE SODIUM 40 MG/10ML
40 INJECTION, POWDER, LYOPHILIZED, FOR SOLUTION INTRAVENOUS
Status: DISCONTINUED | OUTPATIENT
Start: 2024-07-06 | End: 2024-07-08 | Stop reason: HOSPADM

## 2024-07-05 RX ORDER — ACETAMINOPHEN 160 MG/5ML
650 SOLUTION ORAL EVERY 4 HOURS PRN
Status: DISCONTINUED | OUTPATIENT
Start: 2024-07-05 | End: 2024-07-08 | Stop reason: HOSPADM

## 2024-07-05 SDOH — SOCIAL STABILITY: SOCIAL INSECURITY: ARE THERE ANY APPARENT SIGNS OF INJURIES/BEHAVIORS THAT COULD BE RELATED TO ABUSE/NEGLECT?: NO

## 2024-07-05 SDOH — SOCIAL STABILITY: SOCIAL INSECURITY: ARE YOU OR HAVE YOU BEEN THREATENED OR ABUSED PHYSICALLY, EMOTIONALLY, OR SEXUALLY BY ANYONE?: NO

## 2024-07-05 SDOH — SOCIAL STABILITY: SOCIAL INSECURITY: HAVE YOU HAD THOUGHTS OF HARMING ANYONE ELSE?: NO

## 2024-07-05 SDOH — SOCIAL STABILITY: SOCIAL INSECURITY: ABUSE: ADULT

## 2024-07-05 SDOH — SOCIAL STABILITY: SOCIAL INSECURITY: HAVE YOU HAD ANY THOUGHTS OF HARMING ANYONE ELSE?: NO

## 2024-07-05 SDOH — SOCIAL STABILITY: SOCIAL INSECURITY: DO YOU FEEL UNSAFE GOING BACK TO THE PLACE WHERE YOU ARE LIVING?: NO

## 2024-07-05 SDOH — SOCIAL STABILITY: SOCIAL INSECURITY: DO YOU FEEL ANYONE HAS EXPLOITED OR TAKEN ADVANTAGE OF YOU FINANCIALLY OR OF YOUR PERSONAL PROPERTY?: NO

## 2024-07-05 SDOH — SOCIAL STABILITY: SOCIAL INSECURITY: DOES ANYONE TRY TO KEEP YOU FROM HAVING/CONTACTING OTHER FRIENDS OR DOING THINGS OUTSIDE YOUR HOME?: NO

## 2024-07-05 SDOH — SOCIAL STABILITY: SOCIAL INSECURITY: HAS ANYONE EVER THREATENED TO HURT YOUR FAMILY OR YOUR PETS?: NO

## 2024-07-05 SDOH — SOCIAL STABILITY: SOCIAL INSECURITY: WERE YOU ABLE TO COMPLETE ALL THE BEHAVIORAL HEALTH SCREENINGS?: YES

## 2024-07-05 ASSESSMENT — COGNITIVE AND FUNCTIONAL STATUS - GENERAL
EATING MEALS: A LITTLE
PERSONAL GROOMING: A LITTLE
HELP NEEDED FOR BATHING: A LITTLE
DRESSING REGULAR LOWER BODY CLOTHING: A LITTLE
STANDING UP FROM CHAIR USING ARMS: A LITTLE
WALKING IN HOSPITAL ROOM: A LITTLE
HELP NEEDED FOR BATHING: A LITTLE
MOVING TO AND FROM BED TO CHAIR: A LITTLE
WALKING IN HOSPITAL ROOM: A LITTLE
MOVING FROM LYING ON BACK TO SITTING ON SIDE OF FLAT BED WITH BEDRAILS: A LITTLE
MOVING TO AND FROM BED TO CHAIR: A LITTLE
DRESSING REGULAR UPPER BODY CLOTHING: A LITTLE
TURNING FROM BACK TO SIDE WHILE IN FLAT BAD: A LITTLE
TOILETING: A LITTLE
CLIMB 3 TO 5 STEPS WITH RAILING: A LITTLE
DAILY ACTIVITIY SCORE: 18
CLIMB 3 TO 5 STEPS WITH RAILING: A LITTLE
EATING MEALS: A LITTLE
TURNING FROM BACK TO SIDE WHILE IN FLAT BAD: A LITTLE
PATIENT BASELINE BEDBOUND: NO
DRESSING REGULAR LOWER BODY CLOTHING: A LITTLE
STANDING UP FROM CHAIR USING ARMS: A LITTLE
MOVING FROM LYING ON BACK TO SITTING ON SIDE OF FLAT BED WITH BEDRAILS: A LITTLE
MOBILITY SCORE: 18
DRESSING REGULAR UPPER BODY CLOTHING: A LITTLE
MOBILITY SCORE: 18
PERSONAL GROOMING: A LITTLE
TOILETING: A LITTLE
DAILY ACTIVITIY SCORE: 18

## 2024-07-05 ASSESSMENT — PAIN SCALES - GENERAL
PAINLEVEL_OUTOF10: 3
PAINLEVEL_OUTOF10: 0 - NO PAIN
PAINLEVEL_OUTOF10: 4
PAINLEVEL_OUTOF10: 0 - NO PAIN

## 2024-07-05 ASSESSMENT — ACTIVITIES OF DAILY LIVING (ADL)
ADEQUATE_TO_COMPLETE_ADL: YES
TOILETING: NEEDS ASSISTANCE
DRESSING YOURSELF: NEEDS ASSISTANCE
GROOMING: NEEDS ASSISTANCE
PATIENT'S MEMORY ADEQUATE TO SAFELY COMPLETE DAILY ACTIVITIES?: YES
JUDGMENT_ADEQUATE_SAFELY_COMPLETE_DAILY_ACTIVITIES: YES
ASSISTIVE_DEVICE: WALKER
BATHING: NEEDS ASSISTANCE
HEARING - RIGHT EAR: FUNCTIONAL
FEEDING YOURSELF: NEEDS ASSISTANCE
HEARING - LEFT EAR: FUNCTIONAL
LACK_OF_TRANSPORTATION: NO
WALKS IN HOME: NEEDS ASSISTANCE

## 2024-07-05 ASSESSMENT — LIFESTYLE VARIABLES
SUBSTANCE_ABUSE_PAST_12_MONTHS: NO
AUDIT-C TOTAL SCORE: 0
HOW OFTEN DO YOU HAVE 6 OR MORE DRINKS ON ONE OCCASION: NEVER
AUDIT-C TOTAL SCORE: 0
HOW OFTEN DO YOU HAVE A DRINK CONTAINING ALCOHOL: NEVER
PRESCIPTION_ABUSE_PAST_12_MONTHS: NO
HOW MANY STANDARD DRINKS CONTAINING ALCOHOL DO YOU HAVE ON A TYPICAL DAY: PATIENT DOES NOT DRINK
SKIP TO QUESTIONS 9-10: 1

## 2024-07-05 ASSESSMENT — PATIENT HEALTH QUESTIONNAIRE - PHQ9
SUM OF ALL RESPONSES TO PHQ9 QUESTIONS 1 & 2: 0
2. FEELING DOWN, DEPRESSED OR HOPELESS: NOT AT ALL
1. LITTLE INTEREST OR PLEASURE IN DOING THINGS: NOT AT ALL

## 2024-07-05 ASSESSMENT — PAIN - FUNCTIONAL ASSESSMENT
PAIN_FUNCTIONAL_ASSESSMENT: 0-10
PAIN_FUNCTIONAL_ASSESSMENT: 0-10

## 2024-07-05 NOTE — H&P
History Of Present Illness  Riya Hernandez is a 83 y.o. female presenting with falls.  She lives with her daughter.  Daughter found her in the hallway on the ground around 4:30 AM.  Few hours later patient had another fall.  Patient has been last week.  Patient herself is a poor historian     Past medical history: Coronary artery disease s/p stent, MI, Alzheimer's dementia, osteoarthritis left hip, hypertension, high cholesterol, D&C  Social history: Lives with daughter who smokes 3 to 5 cigarettes a day no drinking no drugs  Family history: Father colon cancer mother is 70 coronary artery disease, brother MI at age of 49 and  grandmother with dementia CABG  Past Medical History  Past Medical History:   Diagnosis Date    Alzheimer disease (Multi)     Anxiety     Arrhythmia     Chest pain     Coronary artery disease     Delirium     Delirium     Dementia (Multi)     Hyperlipidemia     Hypertension     Lightheadedness     Myocardial infarction (Multi)     Near syncope        Surgical History  Past Surgical History:   Procedure Laterality Date    CARDIAC CATHETERIZATION      CORONARY STENT PLACEMENT          Social History  She reports that she has been smoking cigarettes. She has a 2.5 pack-year smoking history. She has never been exposed to tobacco smoke. She has never used smokeless tobacco. She reports that she does not drink alcohol and does not use drugs.    Family History  Family History   Problem Relation Name Age of Onset    Heart disease Mother      Other (triple bypass) Mother      Heart disease Brother      Sudden death Brother          cardiac death        Allergies  Patient has no known allergies.    Review of Systems  Patient poor historian  Physical Exam  Vitals reviewed.   Constitutional:       Appearance: Normal appearance.   HENT:      Head: Normocephalic and atraumatic.      Right Ear: Tympanic membrane, ear canal and external ear normal.      Left Ear: Tympanic membrane, ear canal and  "external ear normal.      Nose: Nose normal.      Mouth/Throat:      Pharynx: Oropharynx is clear.   Eyes:      Extraocular Movements: Extraocular movements intact.      Conjunctiva/sclera: Conjunctivae normal.      Pupils: Pupils are equal, round, and reactive to light.   Cardiovascular:      Rate and Rhythm: Normal rate and regular rhythm.      Pulses: Normal pulses.      Heart sounds: Normal heart sounds.   Pulmonary:      Effort: Pulmonary effort is normal.      Breath sounds: Normal breath sounds.   Abdominal:      General: Abdomen is flat. Bowel sounds are normal.      Palpations: Abdomen is soft.   Musculoskeletal:      Cervical back: Normal range of motion and neck supple.   Skin:     General: Skin is warm and dry.   Neurological:      General: No focal deficit present.      Mental Status: She is alert.   Psychiatric:         Mood and Affect: Mood normal.          Last Recorded Vitals  Blood pressure 141/66, pulse 73, temperature 35.7 °C (96.3 °F), temperature source Temporal, resp. rate 18, height 1.651 m (5' 5\"), weight 64.1 kg (141 lb 6.4 oz), SpO2 98%.    Relevant Results        Scheduled medications  aspirin, 81 mg, oral, BID  atorvastatin, 80 mg, oral, Nightly  dilTIAZem CD, 240 mg, oral, Daily  docusate sodium, 100 mg, oral, BID  donepezil, 10 mg, oral, Nightly  enoxaparin, 40 mg, subcutaneous, q24h  losartan, 25 mg, oral, Daily  memantine, 5 mg, oral, Daily  metoprolol tartrate, 25 mg, oral, Daily  [START ON 7/6/2024] pantoprazole, 40 mg, oral, Daily before breakfast   Or  [START ON 7/6/2024] pantoprazole, 40 mg, intravenous, Daily before breakfast  polyethylene glycol, 17 g, oral, Daily  ticagrelor, 90 mg, oral, BID      Continuous medications  potassium uhfidhj-X5-1.45%NaCl, 100 mL/hr, Last Rate: 100 mL/hr (07/05/24 1607)      PRN medications  PRN medications: acetaminophen **OR** acetaminophen **OR** acetaminophen, benzocaine-menthol, dextromethorphan-guaifenesin, guaiFENesin, hydrOXYzine pamoate, " melatonin, [DISCONTINUED] ondansetron **OR** ondansetron, ondansetron ODT  Results for orders placed or performed during the hospital encounter of 07/05/24 (from the past 24 hour(s))   CBC and Auto Differential   Result Value Ref Range    WBC 12.8 (H) 4.4 - 11.3 x10*3/uL    nRBC 0.0 0.0 - 0.0 /100 WBCs    RBC 4.07 4.00 - 5.20 x10*6/uL    Hemoglobin 12.6 12.0 - 16.0 g/dL    Hematocrit 39.0 36.0 - 46.0 %    MCV 96 80 - 100 fL    MCH 31.0 26.0 - 34.0 pg    MCHC 32.3 32.0 - 36.0 g/dL    RDW 13.7 11.5 - 14.5 %    Platelets 310 150 - 450 x10*3/uL    Neutrophils % 84.5 40.0 - 80.0 %    Immature Granulocytes %, Automated 0.5 0.0 - 0.9 %    Lymphocytes % 4.9 13.0 - 44.0 %    Monocytes % 9.8 2.0 - 10.0 %    Eosinophils % 0.1 0.0 - 6.0 %    Basophils % 0.2 0.0 - 2.0 %    Neutrophils Absolute 10.83 (H) 1.60 - 5.50 x10*3/uL    Immature Granulocytes Absolute, Automated 0.07 0.00 - 0.50 x10*3/uL    Lymphocytes Absolute 0.63 (L) 0.80 - 3.00 x10*3/uL    Monocytes Absolute 1.26 (H) 0.05 - 0.80 x10*3/uL    Eosinophils Absolute 0.01 0.00 - 0.40 x10*3/uL    Basophils Absolute 0.02 0.00 - 0.10 x10*3/uL   Basic metabolic panel   Result Value Ref Range    Glucose 119 (H) 65 - 99 mg/dL    Sodium 139 133 - 145 mmol/L    Potassium 3.7 3.4 - 5.1 mmol/L    Chloride 106 97 - 107 mmol/L    Bicarbonate 20 (L) 24 - 31 mmol/L    Urea Nitrogen 23 8 - 25 mg/dL    Creatinine 1.10 0.40 - 1.60 mg/dL    eGFR 49 (L) >60 mL/min/1.73m*2    Calcium 9.1 8.5 - 10.4 mg/dL    Anion Gap 13 <=19 mmol/L   Creatine Kinase   Result Value Ref Range    Creatine Kinase 1,536 (H) 24 - 195 U/L   Hepatic function panel   Result Value Ref Range    AST 40 5 - 40 U/L    ALT 18 5 - 40 U/L    Alkaline Phosphatase 265 (H) 35 - 125 U/L    Bilirubin, Total 0.4 0.1 - 1.2 mg/dL    Bilirubin, Direct <0.2 0.0 - 0.2 mg/dL    Total Protein 7.3 5.9 - 7.9 g/dL    Albumin 3.5 3.5 - 5.0 g/dL   Lipase   Result Value Ref Range    Lipase 26 16 - 63 U/L   ECG 12 lead   Result Value Ref Range     Ventricular Rate 84 BPM    Atrial Rate 84 BPM    KY Interval 162 ms    QRS Duration 80 ms    QT Interval 372 ms    QTC Calculation(Bazett) 439 ms    P Axis 5 degrees    R Axis 83 degrees    T Axis 29 degrees    QRS Count 14 beats    Q Onset 229 ms    P Onset 148 ms    P Offset 197 ms    T Offset 415 ms    QTC Fredericia 416 ms   Urinalysis with Reflex Culture and Microscopic   Result Value Ref Range    Color, Urine Yellow Light-Yellow, Yellow, Dark-Yellow    Appearance, Urine Turbid (N) Clear    Specific Gravity, Urine 1.017 1.005 - 1.035    pH, Urine 6.0 5.0, 5.5, 6.0, 6.5, 7.0, 7.5, 8.0    Protein, Urine 100 (2+) (A) NEGATIVE, 10 (TRACE), 20 (TRACE) mg/dL    Glucose, Urine Normal Normal mg/dL    Blood, Urine 0.2 (2+) (A) NEGATIVE    Ketones, Urine NEGATIVE NEGATIVE mg/dL    Bilirubin, Urine NEGATIVE NEGATIVE    Urobilinogen, Urine Normal Normal mg/dL    Nitrite, Urine 2+ (A) NEGATIVE    Leukocyte Esterase, Urine 500 Rogelio/µL (A) NEGATIVE   Microscopic Only, Urine   Result Value Ref Range    WBC, Urine >50 (A) 1-5, NONE /HPF    WBC Clumps, Urine FEW Reference range not established. /HPF    RBC, Urine >20 (A) NONE, 1-2, 3-5 /HPF    Squamous Epithelial Cells, Urine 10-25 (FEW) Reference range not established. /HPF    Bacteria, Urine 2+ (A) NONE SEEN /HPF    Mucus, Urine FEW Reference range not established. /LPF            Assessment/Plan   Principal Problem:    Fall, initial encounter  Active Problems:    Anxiety    Coronary artery disease    Hyperlipidemia    Traumatic rhabdomyolysis (CMS-HCC)    UTI (urinary tract infection)    Confusion      Gentle IV fluids  IV antibiotics  MRI of the brain to rule out stroke  Ultrasound carotids  Continue home medications  PT OT   consult for rehab  Family wants patient to go to [place] for rehab       I spent  minutes in the professional and overall care of this patient.      Trini Regan MD

## 2024-07-05 NOTE — NURSING NOTE
Patient arrived to room 450 at this time. Alert and oriented to room. Call light and personal belongings within reach. Patient's family at bedside and aware of transfer. Dr. Regan called for admission orders at this time. Dr. Regan states she is on her way in to see the patient.

## 2024-07-05 NOTE — ED PROVIDER NOTES
HPI   Chief Complaint   Patient presents with    Fall     Bib ems from home after a trip and fall this morning at home, increased weakness over the last week. Denies loc, denies blood thinners. NAD noted, no obvious injury. Pt does have recent diagnosis of dementia        Is a 83-year-old female presenting to the emergency department for mechanical fall.  Patient does live at home alone, however, her daughter does come to check on patient frequently.  Daughter found patient to day in her bathroom and believes she fell last night at some point.  Does not know how long she was on the ground but leave she is on the ground since 4:30 AM.  Patient denies any current pain upon presentation.  She does have an abrasion on her left knee but no other obvious injuries.  Patient does have a history of dementia, however, she is able to answer questions.  She denies headache, neck pain, back pain, chest pain, abdominal pain, and she does admit to some pain in the left knee.      Please see HPI for pertinent positive and negative ROS.                   Symone Coma Scale Score: 15                     Patient History   Past Medical History:   Diagnosis Date    Alzheimer disease (Multi)     Anxiety     Arrhythmia     Chest pain     Coronary artery disease     Delirium     Delirium     Dementia (Multi)     Hyperlipidemia     Hypertension     Lightheadedness     Myocardial infarction (Multi)     Near syncope      Past Surgical History:   Procedure Laterality Date    CARDIAC CATHETERIZATION      CORONARY STENT PLACEMENT       Family History   Problem Relation Name Age of Onset    Heart disease Mother      Other (triple bypass) Mother      Heart disease Brother      Sudden death Brother          cardiac death     Social History     Tobacco Use    Smoking status: Every Day     Current packs/day: 0.25     Average packs/day: 0.3 packs/day for 10.0 years (2.5 ttl pk-yrs)     Types: Cigarettes     Passive exposure: Never    Smokeless  tobacco: Never   Substance Use Topics    Alcohol use: Never    Drug use: Never       Physical Exam   ED Triage Vitals   Temperature Heart Rate Respirations BP   07/05/24 1112 07/05/24 1112 07/05/24 1112 07/05/24 1112   36.5 °C (97.7 °F) 95 16 139/69      Pulse Ox Temp Source Heart Rate Source Patient Position   07/05/24 1112 07/05/24 1112 07/05/24 1112 07/05/24 1349   95 % Temporal Monitor Lying      BP Location FiO2 (%)     07/05/24 1349 --     Left arm        Physical Exam  GENERAL APPEARANCE: Awake and alert. No acute respiratory distress.   VITAL SIGNS: As per the nurses' triage record.  HEENT: Normocephalic, atraumatic. Extraocular muscles are intact. Conjunctiva are pink. Negative scleral icterus. Mucous membranes are moist. Tongue in the midline. Oropharynx clear, uvula midline.   NECK: Soft, nontender and supple, no cervical spinous bony step-offs appreciated.  CHEST: Nontender to palpation. Clear to auscultation bilaterally. Symmetric rise and fall of chest wall.   HEART: Clear S1 and S2. Regular rate and rhythm.  Strong and equal pulses in the extremities.  ABDOMEN: Soft, nontender, nondistended  MUSCULOSKELETAL: The calves are nontender to palpation. Full gross active range of motion. Ambulating on own with no acute difficulties.  Patient does not have tenderness palpation over the thoracic or lumbar spinous processes.  No bony step-offs appreciated.  No tenderness palpation over the long bones of upper extremities bilaterally.  Full active range of motion of upper extremities bilaterally.  Full active range of motion of lower extremities bilaterally without tenderness palpation long bones of lower extremities bilaterally.  2+ pedal pulses bilaterally.  NEUROLOGICAL: Awake, alert and oriented x 3. Motor power intact in the upper and lower extremities. Sensation is intact to light touch in the upper and lower extremities. Patient answering questions appropriately.   IMMUNOLOGICAL: No lymphatic streaking  noted  DERMATOLOGIC: Warm and dry without petechiae, rashes, or ecchymosis noted on visible skin.  Superficial abrasion over the anterior aspect of left knee.  PYSCH: Cooperative with appropriate mood and affect.  ED Course & MDM   ED Course as of 07/05/24 1810 Fri Jul 05, 2024   1141 ECG 12 lead  Performed at  1136, HR of 84, NSR, NAD, QTc 439, no sign of STEMI, inverted T waves V1-V3    Reviewed and interpreted by me at time performed   [JM]      ED Course User Index  [JM] Annalise Lares MD         Diagnoses as of 07/05/24 1810   Fall, initial encounter   Elevated creatine kinase level       Medical Decision Making  Parts of this chart have been completed using voice recognition software. Please excuse any errors of transcription.  My thought process and reason for plan has been formulated from the time that I saw the patient until the time of disposition and is not specific to one specific moment during their visit and furthermore my MDM encompasses this entire chart and not only this text box.      HPI: Detailed above.    Exam: A medically appropriate exam performed, outlined above, given the known history and presentation.    History obtained from: Patient    EKG: See my supervising physician's EKG interpretation    Medications given during visit:  Medications   aspirin chewable tablet 81 mg (has no administration in time range)   atorvastatin (Lipitor) tablet 80 mg (has no administration in time range)   dilTIAZem CD (Cardizem CD) 24 hr capsule 240 mg (240 mg oral Not Given 7/5/24 1530)   donepezil (Aricept) tablet 10 mg (has no administration in time range)   hydrOXYzine pamoate (Vistaril) capsule 25 mg (has no administration in time range)   losartan (Cozaar) tablet 25 mg (25 mg oral Not Given 7/5/24 1530)   memantine (Namenda) tablet 5 mg (5 mg oral Not Given 7/5/24 1530)   metoprolol tartrate (Lopressor) tablet 25 mg (25 mg oral Not Given 7/5/24 1530)   ticagrelor (Brilinta) tablet 90 mg (has  no administration in time range)   dextrose 5 % and sodium chloride 0.45 % with KCl 20 mEq/L infusion (100 mL/hr intravenous Rate/Dose Verify 7/5/24 1607)   acetaminophen (Tylenol) tablet 650 mg (has no administration in time range)     Or   acetaminophen (Tylenol) oral liquid 650 mg (has no administration in time range)     Or   acetaminophen (Tylenol) suppository 650 mg (has no administration in time range)   melatonin tablet 3 mg (has no administration in time range)   polyethylene glycol (Glycolax, Miralax) packet 17 g (17 g oral Not Given 7/5/24 1545)   docusate sodium (Colace) capsule 100 mg (has no administration in time range)   benzocaine-menthol (Cepastat Sore Throat) lozenge 1 lozenge (has no administration in time range)   dextromethorphan-guaifenesin (Robitussin DM)  mg/5 mL oral liquid 5 mL (has no administration in time range)   guaiFENesin (Mucinex) 12 hr tablet 600 mg (has no administration in time range)   enoxaparin (Lovenox) syringe 40 mg (40 mg subcutaneous Not Given 7/5/24 1545)   ondansetron (Zofran) injection 4 mg (has no administration in time range)   pantoprazole (ProtoNix) EC tablet 40 mg (has no administration in time range)     Or   pantoprazole (ProtoNix) injection 40 mg (has no administration in time range)   ondansetron ODT (Zofran-ODT) disintegrating tablet 4 mg (has no administration in time range)   cefTRIAXone (Rocephin) IVPB 1 g (has no administration in time range)   diphth,pertus(acell),tetanus (BoostRIX) 2.5-8-5 Lf-mcg-Lf/0.5mL vaccine 0.5 mL (0.5 mL intramuscular Given 7/5/24 1134)   acetaminophen (Tylenol) tablet 650 mg (650 mg oral Given 7/5/24 1134)   sodium chloride 0.9 % bolus 1,000 mL (0 mL intravenous Stopped 7/5/24 1412)        Diagnostic/tests  Labs Reviewed   CBC WITH AUTO DIFFERENTIAL - Abnormal       Result Value    WBC 12.8 (*)     nRBC 0.0      RBC 4.07      Hemoglobin 12.6      Hematocrit 39.0      MCV 96      MCH 31.0      MCHC 32.3      RDW 13.7       Platelets 310      Neutrophils % 84.5      Immature Granulocytes %, Automated 0.5      Lymphocytes % 4.9      Monocytes % 9.8      Eosinophils % 0.1      Basophils % 0.2      Neutrophils Absolute 10.83 (*)     Immature Granulocytes Absolute, Automated 0.07      Lymphocytes Absolute 0.63 (*)     Monocytes Absolute 1.26 (*)     Eosinophils Absolute 0.01      Basophils Absolute 0.02     BASIC METABOLIC PANEL - Abnormal    Glucose 119 (*)     Sodium 139      Potassium 3.7      Chloride 106      Bicarbonate 20 (*)     Urea Nitrogen 23      Creatinine 1.10      eGFR 49 (*)     Calcium 9.1      Anion Gap 13     URINALYSIS WITH REFLEX CULTURE AND MICROSCOPIC - Abnormal    Color, Urine Yellow      Appearance, Urine Turbid (*)     Specific Gravity, Urine 1.017      pH, Urine 6.0      Protein, Urine 100 (2+) (*)     Glucose, Urine Normal      Blood, Urine 0.2 (2+) (*)     Ketones, Urine NEGATIVE      Bilirubin, Urine NEGATIVE      Urobilinogen, Urine Normal      Nitrite, Urine 2+ (*)     Leukocyte Esterase, Urine 500 Rogelio/µL (*)    CREATINE KINASE - Abnormal    Creatine Kinase 1,536 (*)    HEPATIC FUNCTION PANEL - Abnormal    AST 40      ALT 18      Alkaline Phosphatase 265 (*)     Bilirubin, Total 0.4      Bilirubin, Direct <0.2      Total Protein 7.3      Albumin 3.5     MICROSCOPIC ONLY, URINE - Abnormal    WBC, Urine >50 (*)     WBC Clumps, Urine FEW      RBC, Urine >20 (*)     Squamous Epithelial Cells, Urine 10-25 (FEW)      Bacteria, Urine 2+ (*)     Mucus, Urine FEW     LIPASE - Normal    Lipase 26     URINE CULTURE   URINALYSIS WITH REFLEX CULTURE AND MICROSCOPIC    Narrative:     The following orders were created for panel order Urinalysis with Reflex Culture and Microscopic.  Procedure                               Abnormality         Status                     ---------                               -----------         ------                     Urinalysis with Reflex C...[[id number]]  Abnormal            Final  result               Extra Urine Gray Tube[[id number]]                                                         Please view results for these tests on the individual orders.   EXTRA URINE GRAY TUBE      XR knee left 4+ views   Final Result   No acute process.        Signed by: Lui Mccoy 7/5/2024 12:49 PM   Dictation workstation:   [id number]      XR chest 1 view   Final Result   Allowing for the aforementioned limitation, no acute cardiopulmonary   disease.        Signed by: Lui Mccoy 7/5/2024 12:47 PM   Dictation workstation:   [id number]      CT head wo IV contrast   Final Result   No acute intracranial hemorrhage or calvarial fracture.        MACRO   None        Signed by: Rei Issa 7/5/2024 12:10 PM   Dictation workstation:   [id number]      CT cervical spine wo IV contrast   Final Result   No acute fracture or traumatic malalignment.        Signed by: Rei Issa 7/5/2024 12:15 PM   Dictation workstation:   [id number]      MR brain wo IV contrast    (Results Pending)   Carotid duplex bilateral    (Results Pending)   Transthoracic Echo (TTE) Complete    (Results Pending)        Considerations/further MDM:  Patient was seen in conjucntion with my supervising physician,  Dr. Solo. Please refer to her note.    Patient presents with stable vital signs.  Blood pressure 139/69, temperature 97.7 °F, heart rate 95 beats per no, respirations 16, 95% room air    Creatinine kinase is elevated at 1536.  Creatinine kinase was done as patient reportedly was in her bathroom and fell.  Her daughter believes that she was in the bathroom floor overnight.  Daughter clarifies that the patient does live at home alone but daughter comes to patient's home.  CBC shows a very mild leukocytosis at 12.8.  Alk phos is elevated however has been elevated on previous hepatic function panels.  Urinalysis was pending at time of admission.  Imaging including CT scan of head and cervical spine was unremarkable.   Chest x-ray and left knee x-ray were unremarkable as well.  Due to patient having increased falls at home with evidence of rhabdomyolysis, plan for admission.    The patient was evaluated in the emergency department and an etiology requiring emergent treatment or admission to hospital was identified.  The patient/family was counseled on clinical expression, expectations, and plan along with recommendations for admission.  All questions were answered and involved parties were understanding and agreeable to course of treatment.  Case was discussed with admitting physician, Dr. Regan.  Bed type ED treatment and further ED work-up decided by joint decision making with admitting team and any consultants.  Patient stable for admission per my assessment and further management of patient will be deferred to the inpatient setting.      Procedure  Procedures     Misty Carreno PA-C  07/05/24 8542

## 2024-07-05 NOTE — CARE PLAN
The patient's goals for the shift include  pain control, safety  Problem: Pain  Goal: Takes deep breaths with improved pain control throughout the shift  Outcome: Progressing  Goal: Turns in bed with improved pain control throughout the shift  Outcome: Progressing  Goal: Walks with improved pain control throughout the shift  Outcome: Progressing  Goal: Performs ADL's with improved pain control throughout shift  Outcome: Progressing  Goal: Participates in PT with improved pain control throughout the shift  Outcome: Progressing  Goal: Free from opioid side effects throughout the shift  Outcome: Progressing  Goal: Free from acute confusion related to pain meds throughout the shift  Outcome: Progressing     Problem: Skin  Goal: Decreased wound size/increased tissue granulation at next dressing change  Outcome: Progressing  Goal: Participates in plan/prevention/treatment measures  Outcome: Progressing  Goal: Prevent/manage excess moisture  Outcome: Progressing  Goal: Prevent/minimize sheer/friction injuries  Outcome: Progressing  Goal: Promote/optimize nutrition  Outcome: Progressing  Goal: Promote skin healing  Outcome: Progressing       The clinical goals for the shift include monitor vital signs and labs, work with therapy, safety

## 2024-07-06 LAB
ALBUMIN SERPL-MCNC: 3.1 G/DL (ref 3.5–5)
ALP BLD-CCNC: 238 U/L (ref 35–125)
ALT SERPL-CCNC: 19 U/L (ref 5–40)
ANION GAP SERPL CALC-SCNC: 12 MMOL/L
AST SERPL-CCNC: 39 U/L (ref 5–40)
BILIRUB SERPL-MCNC: 0.2 MG/DL (ref 0.1–1.2)
BUN SERPL-MCNC: 18 MG/DL (ref 8–25)
C DIF TOX TCDA+TCDB STL QL NAA+PROBE: NOT DETECTED
CALCIUM SERPL-MCNC: 8.5 MG/DL (ref 8.5–10.4)
CHLORIDE SERPL-SCNC: 107 MMOL/L (ref 97–107)
CO2 SERPL-SCNC: 21 MMOL/L (ref 24–31)
CREAT SERPL-MCNC: 0.9 MG/DL (ref 0.4–1.6)
EGFRCR SERPLBLD CKD-EPI 2021: 63 ML/MIN/1.73M*2
ERYTHROCYTE [DISTWIDTH] IN BLOOD BY AUTOMATED COUNT: 13.7 % (ref 11.5–14.5)
GLUCOSE SERPL-MCNC: 119 MG/DL (ref 65–99)
HCT VFR BLD AUTO: 36.8 % (ref 36–46)
HGB BLD-MCNC: 11.8 G/DL (ref 12–16)
MCH RBC QN AUTO: 31.2 PG (ref 26–34)
MCHC RBC AUTO-ENTMCNC: 32.1 G/DL (ref 32–36)
MCV RBC AUTO: 97 FL (ref 80–100)
NRBC BLD-RTO: 0 /100 WBCS (ref 0–0)
PLATELET # BLD AUTO: 321 X10*3/UL (ref 150–450)
POTASSIUM SERPL-SCNC: 3.8 MMOL/L (ref 3.4–5.1)
PROT SERPL-MCNC: 6.2 G/DL (ref 5.9–7.9)
RBC # BLD AUTO: 3.78 X10*6/UL (ref 4–5.2)
SODIUM SERPL-SCNC: 140 MMOL/L (ref 133–145)
WBC # BLD AUTO: 11.7 X10*3/UL (ref 4.4–11.3)

## 2024-07-06 PROCEDURE — C9113 INJ PANTOPRAZOLE SODIUM, VIA: HCPCS | Performed by: INTERNAL MEDICINE

## 2024-07-06 PROCEDURE — 2500000002 HC RX 250 W HCPCS SELF ADMINISTERED DRUGS (ALT 637 FOR MEDICARE OP, ALT 636 FOR OP/ED): Performed by: INTERNAL MEDICINE

## 2024-07-06 PROCEDURE — 97530 THERAPEUTIC ACTIVITIES: CPT | Mod: GP

## 2024-07-06 PROCEDURE — 85027 COMPLETE CBC AUTOMATED: CPT | Performed by: INTERNAL MEDICINE

## 2024-07-06 PROCEDURE — 82374 ASSAY BLOOD CARBON DIOXIDE: CPT | Performed by: INTERNAL MEDICINE

## 2024-07-06 PROCEDURE — 97535 SELF CARE MNGMENT TRAINING: CPT | Mod: GO

## 2024-07-06 PROCEDURE — 1100000001 HC PRIVATE ROOM DAILY

## 2024-07-06 PROCEDURE — 36415 COLL VENOUS BLD VENIPUNCTURE: CPT | Performed by: INTERNAL MEDICINE

## 2024-07-06 PROCEDURE — 97165 OT EVAL LOW COMPLEX 30 MIN: CPT | Mod: GO

## 2024-07-06 PROCEDURE — 99232 SBSQ HOSP IP/OBS MODERATE 35: CPT | Performed by: INTERNAL MEDICINE

## 2024-07-06 PROCEDURE — 2500000001 HC RX 250 WO HCPCS SELF ADMINISTERED DRUGS (ALT 637 FOR MEDICARE OP): Performed by: INTERNAL MEDICINE

## 2024-07-06 PROCEDURE — 2500000004 HC RX 250 GENERAL PHARMACY W/ HCPCS (ALT 636 FOR OP/ED): Performed by: INTERNAL MEDICINE

## 2024-07-06 PROCEDURE — 87493 C DIFF AMPLIFIED PROBE: CPT | Performed by: INTERNAL MEDICINE

## 2024-07-06 PROCEDURE — 97161 PT EVAL LOW COMPLEX 20 MIN: CPT | Mod: GP

## 2024-07-06 ASSESSMENT — COGNITIVE AND FUNCTIONAL STATUS - GENERAL
TURNING FROM BACK TO SIDE WHILE IN FLAT BAD: A LOT
CLIMB 3 TO 5 STEPS WITH RAILING: A LOT
DRESSING REGULAR LOWER BODY CLOTHING: A LOT
CLIMB 3 TO 5 STEPS WITH RAILING: A LOT
STANDING UP FROM CHAIR USING ARMS: A LOT
WALKING IN HOSPITAL ROOM: A LOT
MOVING TO AND FROM BED TO CHAIR: A LOT
MOVING FROM LYING ON BACK TO SITTING ON SIDE OF FLAT BED WITH BEDRAILS: A LITTLE
HELP NEEDED FOR BATHING: A LITTLE
EATING MEALS: A LITTLE
DRESSING REGULAR LOWER BODY CLOTHING: A LOT
MOBILITY SCORE: 13
MOBILITY SCORE: 13
MOVING TO AND FROM BED TO CHAIR: A LOT
PERSONAL GROOMING: A LITTLE
TOILETING: A LOT
EATING MEALS: A LITTLE
WALKING IN HOSPITAL ROOM: A LOT
DRESSING REGULAR UPPER BODY CLOTHING: A LITTLE
STANDING UP FROM CHAIR USING ARMS: A LOT
HELP NEEDED FOR BATHING: A LITTLE
DRESSING REGULAR UPPER BODY CLOTHING: A LITTLE
PERSONAL GROOMING: A LITTLE
DAILY ACTIVITIY SCORE: 16
TURNING FROM BACK TO SIDE WHILE IN FLAT BAD: A LOT
DAILY ACTIVITIY SCORE: 16
TOILETING: A LOT
MOVING FROM LYING ON BACK TO SITTING ON SIDE OF FLAT BED WITH BEDRAILS: A LITTLE

## 2024-07-06 ASSESSMENT — ACTIVITIES OF DAILY LIVING (ADL)
BATHING_ASSISTANCE: MINIMAL
ADL_ASSISTANCE: INDEPENDENT
HOME_MANAGEMENT_TIME_ENTRY: 12
ADL_ASSISTANCE: NEEDS ASSISTANCE

## 2024-07-06 ASSESSMENT — PAIN SCALES - GENERAL
PAINLEVEL_OUTOF10: 0 - NO PAIN
PAINLEVEL_OUTOF10: 3
PAINLEVEL_OUTOF10: 0 - NO PAIN

## 2024-07-06 ASSESSMENT — PAIN DESCRIPTION - LOCATION: LOCATION: HEAD

## 2024-07-06 ASSESSMENT — PAIN - FUNCTIONAL ASSESSMENT
PAIN_FUNCTIONAL_ASSESSMENT: 0-10

## 2024-07-06 NOTE — PROGRESS NOTES
Riya Hernandez is a 83 y.o. female on day 1 of admission presenting with Fall, initial encounter.      Subjective   Remains pleasantly confused       Objective     Last Recorded Vitals  /66 (BP Location: Left arm, Patient Position: Sitting)   Pulse 66   Temp 36.3 °C (97.3 °F) (Temporal)   Resp 16   Wt 64.1 kg (141 lb 6.4 oz)   SpO2 98%   Intake/Output last 3 Shifts:    Intake/Output Summary (Last 24 hours) at 7/6/2024 1833  Last data filed at 7/6/2024 1808  Gross per 24 hour   Intake 3050 ml   Output 0 ml   Net 3050 ml       Admission Weight  Weight: 64.1 kg (141 lb 6.4 oz) (07/05/24 1112)    Daily Weight  07/05/24 : 64.1 kg (141 lb 6.4 oz)    Image Results  Carotid duplex bilateral  Narrative: Interpreted By:  Lui Mccoy,   STUDY:  [id number]; 7/5/2024 7:33 pm      INDICATION:  Signs/Symptoms:Confusion :      COMPARISON:  None.      ACCESSION NUMBER(S):  [id number]      ORDERING CLINICIAN:  GENARO OCAMPO      TECHNIQUE:  Carotid Examination using B-mode, color flow and doppler spectral  analysis.      FINDINGS:  RIGHT CAROTID SYSTEM  DCCA PSV/EDV: 64.8 cm/s / 11.9 cm/s cm/sec  ECA PSV: 78.7 cm/s cm/sec  PICA PSV/EDV: 89.3 / 18.1 cm/sec  ARON PSV/EDV: 103.9 cm/s / 19.9 cm/s cm/sec  DICA PSV/EDV: 100.2 cm/s / 18.1 cm/s cm/sec  EULALIA/VCC Ratio: 1.4  VERT : Antegrade      LEFT CAROTID SYSTEM  DCCA PSV/EDV: 69.8 cm/s,17.1 cm/s / 17.1 cm/s cm/sec  ECA PSV: 155.6 cm/s cm/sec  PICA PSV/EDV:  /  cm/sec  ARON PSV/EDV: 145 / 26 cm/sec  DICA PSV/EDV:  / 37.3 cm/s cm/sec  EULALIA/VCC Ratio: 3.5  VERT : Antegrade      DUPLEX IMAGES:  Right Carotid System: Less than 50% stenosis of the right ICA.      Left Carotid System: 50-69% stenosis of the left ICA.      The estimate of the degree of stenosis included in this report is  based on the NASCET method for calculating stenosis, using the  internal carotid artery distal to the stenosis as the reference point.      Impression: 50-69% stenosis of the left ICA and less than 50%  stenosis of the  right ICA due to atherosclerotic plaque bilaterally.      MACRO:  None.      Signed by: Lui Ruthchano 7/6/2024 11:56 AM  Dictation workstation:   [id number]      Physical Exam  Vitals reviewed.   Constitutional:       Appearance: Normal appearance.   HENT:      Head: Normocephalic and atraumatic.      Right Ear: Tympanic membrane, ear canal and external ear normal.      Left Ear: Tympanic membrane, ear canal and external ear normal.      Nose: Nose normal.      Mouth/Throat:      Pharynx: Oropharynx is clear.   Eyes:      Extraocular Movements: Extraocular movements intact.      Conjunctiva/sclera: Conjunctivae normal.      Pupils: Pupils are equal, round, and reactive to light.   Cardiovascular:      Rate and Rhythm: Normal rate and regular rhythm.      Pulses: Normal pulses.      Heart sounds: Normal heart sounds.   Pulmonary:      Effort: Pulmonary effort is normal.      Breath sounds: Normal breath sounds.   Abdominal:      General: Abdomen is flat. Bowel sounds are normal.      Palpations: Abdomen is soft.   Musculoskeletal:      Cervical back: Normal range of motion and neck supple.   Skin:     General: Skin is warm and dry.   Neurological:      General: No focal deficit present.      Mental Status: She is alert. She is disoriented.   Psychiatric:         Mood and Affect: Mood normal.         Relevant Results             Scheduled medications  aspirin, 81 mg, oral, BID  atorvastatin, 80 mg, oral, Nightly  cefTRIAXone, 1 g, intravenous, q24h  dilTIAZem CD, 240 mg, oral, Daily  docusate sodium, 100 mg, oral, BID  donepezil, 10 mg, oral, Nightly  enoxaparin, 40 mg, subcutaneous, q24h  losartan, 25 mg, oral, Daily  memantine, 5 mg, oral, Daily  metoprolol tartrate, 25 mg, oral, Daily  pantoprazole, 40 mg, oral, Daily before breakfast   Or  pantoprazole, 40 mg, intravenous, Daily before breakfast  polyethylene glycol, 17 g, oral, Daily  ticagrelor, 90 mg, oral, BID      Continuous  medications  potassium rljcqqr-L4-4.45%NaCl, 100 mL/hr, Last Rate: 100 mL/hr (07/06/24 1808)      PRN medications  PRN medications: acetaminophen **OR** acetaminophen **OR** acetaminophen, benzocaine-menthol, dextromethorphan-guaifenesin, guaiFENesin, hydrOXYzine pamoate, melatonin, [DISCONTINUED] ondansetron **OR** ondansetron, ondansetron ODT  Results for orders placed or performed during the hospital encounter of 07/05/24 (from the past 24 hour(s))   CBC   Result Value Ref Range    WBC 11.7 (H) 4.4 - 11.3 x10*3/uL    nRBC 0.0 0.0 - 0.0 /100 WBCs    RBC 3.78 (L) 4.00 - 5.20 x10*6/uL    Hemoglobin 11.8 (L) 12.0 - 16.0 g/dL    Hematocrit 36.8 36.0 - 46.0 %    MCV 97 80 - 100 fL    MCH 31.2 26.0 - 34.0 pg    MCHC 32.1 32.0 - 36.0 g/dL    RDW 13.7 11.5 - 14.5 %    Platelets 321 150 - 450 x10*3/uL   Comprehensive metabolic panel   Result Value Ref Range    Glucose 119 (H) 65 - 99 mg/dL    Sodium 140 133 - 145 mmol/L    Potassium 3.8 3.4 - 5.1 mmol/L    Chloride 107 97 - 107 mmol/L    Bicarbonate 21 (L) 24 - 31 mmol/L    Urea Nitrogen 18 8 - 25 mg/dL    Creatinine 0.90 0.40 - 1.60 mg/dL    eGFR 63 >60 mL/min/1.73m*2    Calcium 8.5 8.5 - 10.4 mg/dL    Albumin 3.1 (L) 3.5 - 5.0 g/dL    Alkaline Phosphatase 238 (H) 35 - 125 U/L    Total Protein 6.2 5.9 - 7.9 g/dL    AST 39 5 - 40 U/L    Bilirubin, Total 0.2 0.1 - 1.2 mg/dL    ALT 19 5 - 40 U/L    Anion Gap 12 <=19 mmol/L   C. difficile, PCR    Specimen: Stool   Result Value Ref Range    C. difficile, PCR Not Detected Not Detected     Carotid duplex bilateral    Result Date: 7/6/2024  Interpreted By:  Lui Mccoy, STUDY: [id number]; 7/5/2024 7:33 pm   INDICATION: Signs/Symptoms:Confusion :   COMPARISON: None.   ACCESSION NUMBER(S): [id number]   ORDERING CLINICIAN: GENARO OCAMPO   TECHNIQUE: Carotid Examination using B-mode, color flow and doppler spectral analysis.   FINDINGS: RIGHT CAROTID SYSTEM DCCA PSV/EDV: 64.8 cm/s / 11.9 cm/s cm/sec ECA PSV: 78.7 cm/s cm/sec PICA  PSV/EDV: 89.3 / 18.1 cm/sec ARON PSV/EDV: 103.9 cm/s / 19.9 cm/s cm/sec DICA PSV/EDV: 100.2 cm/s / 18.1 cm/s cm/sec EULALIA/VCC Ratio: 1.4 VERT : Antegrade   LEFT CAROTID SYSTEM DCCA PSV/EDV: 69.8 cm/s,17.1 cm/s / 17.1 cm/s cm/sec ECA PSV: 155.6 cm/s cm/sec PICA PSV/EDV:  /  cm/sec ARON PSV/EDV: 145 / 26 cm/sec DICA PSV/EDV:  / 37.3 cm/s cm/sec EULALIA/VCC Ratio: 3.5 VERT : Antegrade   DUPLEX IMAGES: Right Carotid System: Less than 50% stenosis of the right ICA.   Left Carotid System: 50-69% stenosis of the left ICA.   The estimate of the degree of stenosis included in this report is based on the NASCET method for calculating stenosis, using the internal carotid artery distal to the stenosis as the reference point.       50-69% stenosis of the left ICA and less than 50% stenosis of the right ICA due to atherosclerotic plaque bilaterally.   MACRO: None.   Signed by: Lui Mccoy 7/6/2024 11:56 AM Dictation workstation:   [id number]    ECG 12 lead    Result Date: 7/5/2024  Normal sinus rhythm Nonspecific ST and T wave abnormality Abnormal ECG When compared with ECG of 19-DEC-2023 15:09, Inverted T waves have replaced nonspecific T wave abnormality in Inferior leads Inverted T waves have replaced nonspecific T wave abnormality in Anterior leads Confirmed by Alber Blue ([id number]) on 7/5/2024 5:21:45 PM    XR knee left 4+ views    Result Date: 7/5/2024  Interpreted By:  Lui Mccoy, STUDY: XR KNEE LEFT 4+ VIEWS; 7/5/2024 12:13 pm   INDICATION: Signs/Symptoms:left knee pain after fall   COMPARISON: None.   ACCESSION NUMBER(S): [id number]   ORDERING CLINICIAN: GLEN QUINTEROS   TECHNIQUE: Number of films: Four view radiographs of the left knee.   FINDINGS: No fractures or destructive lesions are identified. The joint spaces and articular surfaces are maintained considering patient's age. The alignment is anatomic. Vascular calcifications are noted. There is no significant effusion in the suprapatellar bursa.       No  acute process.   Signed by: Lui Mccoy 7/5/2024 12:49 PM Dictation workstation:   [id number]    XR chest 1 view    Result Date: 7/5/2024  Interpreted By:  Lui Mccoy, STUDY: XR CHEST 1 VIEW; 7/5/2024 12:13 pm   INDICATION: Signs/Symptoms:weakness   COMPARISON: December 2023   ACCESSION NUMBER(S): [id number]   ORDERING CLINICIAN: GLEN QUINTEROS   FINDINGS: The study is limited due to rotation. The cardiomediastinal silhouette is within normal limits for the technique. Calcifications involve the tortuous aorta. There is mild prominence of the interstitial markings bilaterally. There is no pneumothorax, confluent infiltrates or significant effusion. The osseous structures are unchanged.       Allowing for the aforementioned limitation, no acute cardiopulmonary disease.   Signed by: Lui Mccoy 7/5/2024 12:47 PM Dictation workstation:   [id number]    CT cervical spine wo IV contrast    Result Date: 7/5/2024  Interpreted By:  Rei Issa, STUDY: CT CERVICAL SPINE WO IV CONTRAST;  7/5/2024 12:05 pm   INDICATION: Signs/Symptoms:fall.   COMPARISON: CT C-spine 06/14/2022.   ACCESSION NUMBER(S): [id number]   ORDERING CLINICIAN: GLEN QUINTEROS   TECHNIQUE: Thin section axial images were obtained from the skull base down through the thoracic inlet. Sagittal and coronal reconstruction images were generated. Soft tissue, lung, and bone windows were reviewed.   FINDINGS: Prevertebral/Paraspinal Soft Tissues: No acute abnormalities.   CERVICAL SPINE: Hardware: None. Fracture: None. Vertebral Body Heights: Normal. Alignment: No traumatic listhesis. Spinal canal and neural foramina: Unchanged moderate to severe canal stenosis at C3-C4, C5-C6 and C6-C7. Unchanged severe neural foramina narrowing at C4-C5 bilaterally and C5-C6 bilaterally.       No acute fracture or traumatic malalignment.   Signed by: Rei Issa 7/5/2024 12:15 PM Dictation workstation:   [id number]    CT head wo IV contrast    Result  Date: 7/5/2024  Interpreted By:  Rei Issa, STUDY: CT HEAD WO IV CONTRAST;  7/5/2024 12:05 pm   INDICATION: Signs/Symptoms:fall, no LOC.   COMPARISON: CT head 06/14/2022.   ACCESSION NUMBER(S): [id number]   ORDERING CLINICIAN: GLEN QUINTEROS   TECHNIQUE: Noncontrast axial CT scan of head was performed.   FINDINGS: Parenchyma: No intracranial hemorrhage. The grey-white differentiation is intact. No mass effect or midline shift. Patchy periventricular white matter hypodensities, likely chronic microvascular ischemic change. Moderate diffuse parenchymal atrophy.   CSF Spaces: The ventricles, sulci and basal cisterns are within normal limits for age.   Extra-Axial Fluid: No extraaxial fluid collection.   Calvarium: No acute fracture.   Paranasal sinuses: Visualized paranasal sinuses are clear.   Mastoids: Clear.   Orbits: No acute abnormality.   Soft tissues: No acute abnormality.       No acute intracranial hemorrhage or calvarial fracture.   MACRO None   Signed by: Rei Issa 7/5/2024 12:10 PM Dictation workstation:   [id number]     Assessment/Plan                  Principal Problem:    Fall, initial encounter  Active Problems:    Anxiety    Coronary artery disease    Hyperlipidemia    Traumatic rhabdomyolysis (CMS-HCC)    UTI (urinary tract infection)    Confusion    Urine culture pending  Continue IV antibiotics  Continue IV fluids for rhabdomyolysis  PT OT and plan for rehab placement              Trini Regan MD

## 2024-07-06 NOTE — PROGRESS NOTES
Evaluation/Treatment    Patient Name: Riya Hernandez  MRN: [id number]  : 1940  Today's Date: 24  Time Calculation  Start Time: 812  Stop Time: 839  Time Calculation (min): 27 min       Assessment:  OT Assessment: 84 yo female admitted following a fall  resulting in her lying on the ground for an unknown period of time presents with significant functional deficits d/t impaired activity tolerance and generalized weakness.  OT to address deficits by providing ADL retraining utilizing compensatory techniques and progressive strengthening in order to maximize functional capacity and safety with mobility prior to returning home.  Prognosis: Good  Barriers to Discharge: Other (Comment) (mod assist for transfers and ADLs, high falls risk)  Evaluation/Treatment Tolerance: Patient limited by fatigue  End of Session Communication: Bedside nurse  End of Session Patient Position: Up in chair, Alarm on  OT Assessment Results: Decreased ADL status, Decreased safe judgment during ADL, Decreased endurance, Decreased functional mobility, Decreased IADLs  Prognosis: Good  Barriers to Discharge: Other (Comment) (mod assist for transfers and ADLs, high falls risk)  Evaluation/Treatment Tolerance: Patient limited by fatigue  Strengths: Attitude of self, Support of Caregivers  Barriers to Participation: Comorbidities    Plan:  Treatment Interventions: ADL retraining, Functional transfer training, UE strengthening/ROM, Endurance training, Patient/family training, Equipment evaluation/education, Compensatory technique education  OT Frequency: 3 times per week  OT Discharge Recommendations: Moderate intensity level of continued care  Equipment Recommended upon Discharge: Wheeled walker  OT Recommended Transfer Status: Assist of 2  OT - OK to Discharge: Yes  Treatment Interventions: ADL retraining, Functional transfer training, UE strengthening/ROM, Endurance training, Patient/family training, Equipment evaluation/education,  Compensatory technique education        Subjective   Current Problem:  1. Fall, initial encounter  Carotid duplex bilateral    Carotid duplex bilateral    Transthoracic Echo (TTE) Complete    Transthoracic Echo (TTE) Complete      2. Elevated creatine kinase level        3. Feels as though will fall  Carotid duplex bilateral    Carotid duplex bilateral      4. Syncope, unspecified syncope type  Transthoracic Echo (TTE) Complete    Transthoracic Echo (TTE) Complete            General:   OT Received On: 07/06/24  General  Reason for Referral: Impaired ADLs  Referred By: Dr TATIANNA Regan  Past Medical History Relevant to Rehab: CAD, MI, Alzheimer's Dementia, OA L hip, HTN, hypercholesterolemia, HTN, hyperlipidemia  Family/Caregiver Present: No  Patient Position Received: Bed, 4 rail up, Alarm on  Preferred Learning Style: verbal, visual  General Comment: 82 yo female admitted with weakness resulting in a fall was cleared by nursing for therapy and agreeable to same    Precautions:  Hearing/Visual Limitations: [place], glasses for reading  Medical Precautions: Fall precautions    Pain:  Pain Assessment  Pain Assessment: 0-10  0-10 (Numeric) Pain Score: 0 - No pain        Objective   Cognition:  Overall Cognitive Status: Impaired at baseline  Orientation Level: Disoriented to situation, Disoriented to time  Following Commands: Follows one step commands without difficulty  Insight: Moderate  Impulsive: Mildly  Processing Speed: Delayed    Home Living:  Type of Home: House  Lives With:  (Daughter and son-in-law)  Home Adaptive Equipment: Walker rolling or standard  Home Layout: One level  Home Access: Stairs to enter with rails  Entrance Stairs-Rails: Both  Entrance Stairs-Number of Steps: 2  Bathroom Shower/Tub: Walk-in shower  Bathroom Equipment: Grab bars in shower (shower chair?)  Home Living Comments: Patient is an unreliable historian.  Information taken primarily from a previous intake.    Prior Function:  Level of  "[place]: Independent with ADLs and functional transfers  Receives Help From: Family (Family does IADLs)  ADL Assistance: Independent (per patient)  Ambulatory Assistance: Independent (per patient; reports using a walker \"as needed\")    ADL:  Eating Assistance: Stand by  Eating Deficit: Setup (assist opening containers)  Grooming Assistance: Stand by  Grooming Deficit: Setup (washing face and hands)  Bathing Assistance: Minimal  Bathing Deficit: Buttocks, Perineal area  UE Dressing Assistance: Minimal  UE Dressing Deficit:  (doffing a pullover shirt)  LE Dressing Assistance: Moderate  LE Dressing Deficit:  (assist donning/doffing briefs and donning left sock (able to tru right and doff bilateral socks with setup assist))  Toileting Assistance with Device: Maximal  Toileting Deficit: Clothing management up, Clothing management down, Perineal hygiene (per clinical judgement)    Activity Tolerance:  Endurance: Decreased tolerance for upright activites    Bed Mobility/Transfers: Bed Mobility 1  Bed Mobility 1: Supine to sitting  Level of Assistance 1: Moderate assistance  Bed Mobility Comments 1: assist for trunk with head elevated ~40 degrees toward the left side of bed    Transfer 1  Transfer From 1: Bed to  Transfer to 1: Stand  Technique 1: Sit to stand  Transfer Device 1: Walker  Transfer Level of Assistance 1: Moderate assistance  Trials/Comments 1: cues for safe hand placement  Transfers 2  Transfer From 2: Chair with arms to  Transfer to 2: Stand  Technique 2: Sit to stand, Stand to sit  Transfer Device 2: Walker  Transfer Level of Assistance 2: Moderate assistance  Trials/Comments 2: cues for hand placement    Functional Mobility:  Functional Mobility  Functional Mobility Performed:  (Min assist x 2 to ambulate ~15' with a 2 wheeled walker.  Gait unsteady and cues required throughout for safety.)    Sensation:  Light Touch: No apparent deficits    Strength:  Strength Comments: BUEs=~4-/5 grossly    Hand " Function:  Hand Function  Gross Grasp: Functional  Coordination: Functional    Extremities: RUE   RUE : Within Functional Limits and LUE   LUE: Within Functional Limits    Outcome Measures: [facility] Daily Activity  Putting on and taking off regular lower body clothing: A lot  Bathing (including washing, rinsing, drying): A little  Putting on and taking off regular upper body clothing: A little  Toileting, which includes using toilet, bedpan or urinal: A lot  Taking care of personal grooming such as brushing teeth: A little  Eating Meals: A little  Daily Activity - Total Score: 16    Education Documentation  ADL Training, taught by Renate Connolly OT at 7/6/2024  9:14 AM.  Learner: Patient  Readiness: Acceptance  Method: Demonstration, Explanation  Response: Demonstrated Understanding, Needs Reinforcement  Comment: Initiated ADL and functional transfer/mobility retraining    OP EDUCATION:  Education  Individual(s) Educated: Patient  Education Provided: Fall precautons, Risk and benefits of OT discussed with patient or other, POC discussed and agreed upon  Risk and Benefits Discussed with Patient/Caregiver/Other: yes  Patient/Caregiver Demonstrated Understanding: yes  Plan of Care Discussed and Agreed Upon: yes  Patient Response to Education: Patient/Caregiver Verbalized Understanding of Information    Goals:  Encounter Problems       Encounter Problems (Active)       OT Goals       ADLs (Progressing)       Start:  07/06/24    Expected End:  07/20/24       Pt will complete bathing, dressing, and toileting tasks independently using adaptive aides and with increased time as needed.         Functional transfers (Progressing)       Start:  07/06/24    Expected End:  07/20/24       Pt will complete toilet, bed, and chair transfers independently using elevated seat heights and bilateral arm supports as needed.         Activity tolerance (Progressing)       Start:  07/06/24    Expected End:  07/20/24       Pt will tolerate  25 minutes of therapeutic activity in order to increase functional endurance needed for ADLs.

## 2024-07-06 NOTE — PROGRESS NOTES
Spiritual Care Visit    Clinical Encounter Type  Visited With: Patient  Routine Visit: Introduction  Continue Visiting: Yes         Values/Beliefs  Spiritual Requests During Hospitalization: Anointing & Communion today    Sacramental Encounters  Communion: Patient wants communion  Communion Given Indicator: Yes  Sacrament of Sick-Anointing: Anointed     Fahad Negron

## 2024-07-06 NOTE — CARE PLAN
Problem: Safety - Adult  Goal: Free from fall injury  Outcome: Progressing     Problem: Discharge Planning  Goal: Discharge to home or other facility with appropriate resources  Outcome: Progressing   The patient's goals for the shift include      The clinical goals for the shift include Maintain pt safety/comfort; monitor labs/vitals/tele

## 2024-07-06 NOTE — PROGRESS NOTES
Physical Therapy    Physical Therapy Evaluation & Treatment    Patient Name: Riya Hernandez  MRN: [id number]  Today's Date: 7/6/2024   Time Calculation  Start Time: 0801  Stop Time: 0825  Time Calculation (min): 24 min    Assessment/Plan   PT Assessment  PT Assessment Results: Decreased strength, Decreased endurance, Impaired balance, Decreased mobility, Decreased coordination, Decreased cognition, Impaired judgement, Decreased safety awareness, Decreased skin integrity  Rehab Prognosis: Good  Barriers to Discharge: falls  Evaluation/Treatment Tolerance: Patient limited by fatigue  Medical Staff Made Aware: Yes  Strengths: Attitude of self, Support and attitude of living partners  Barriers to Participation: Ability to acquire knowledge  End of Session Communication: Bedside nurse  Assessment Comment: 83 yr old who admits with functional decline s/p fall, all scans negative for injury. Patient has suffered a decline in function, requires mod A x 1 for safe mobility and presents with B LE weakness, impaired balance, and decreased functional activity tolerance. Moderate intensity rehab follow up recommendend.  End of Session Patient Position: Up in chair, Alarm on   IP OR SWING BED PT PLAN  Inpatient or Swing Bed: Inpatient  PT Plan  Treatment/Interventions: Bed mobility, Transfer training, Gait training, Therapeutic activity  PT Plan: Ongoing PT  PT Frequency: 4 times per week  PT Discharge Recommendations: Moderate intensity level of continued care  Equipment Recommended upon Discharge: Wheeled walker  PT Recommended Transfer Status: Assist x1, Assistive device (mod A)  PT - OK to Discharge: Yes      Subjective     General Visit Information:  General  Reason for Referral: Impaired mobility, fall, +UTI, traumatic rhabdomyolysis  Referred By: Dr Regan  Past Medical History Relevant to Rehab: CAD, MI, Alzheimer's Dementia, OA L hip, HTN, hypercholesterolemia, HTN, hyperlipidemia  Family/Caregiver Present: No  Patient  Position Received: Bed, 4 rail up, Alarm on  Preferred Learning Style: auditory, verbal  General Comment: 83 yr old to ED after daughter found patient on floor  not sure of time down, found to be +UTI, traumatic rhabdomyolysis  Home Living:  Home Living  Lives With: Adult children  Home Adaptive Equipment: Walker rolling or standard  Home Layout: One level  Home Living Comments: Patient is limited historian due to cognition  Prior Level of Function:  Prior Function Per Pt/Caregiver Report  Level of Door: Independent with ADLs and functional transfers  Receives Help From: Family  ADL Assistance: Needs assistance  Homemaking Assistance: Needs assistance  Meal Prep:  (uses walker as needed per patient)  Ambulatory Assistance: Independent  Prior Function Comments: Daughter manages driving, home management, meal prep. Information taken from patient and medical record due to patient's cognitive status.  Precautions:  Precautions  Hearing/Visual Limitations: [place], glasses for reading  Medical Precautions: Fall precautions  Vital Signs:       Objective   Pain:  Pain Assessment  Pain Assessment: 0-10  0-10 (Numeric) Pain Score: 0 - No pain  Cognition:  Cognition  Overall Cognitive Status: Impaired at baseline  Orientation Level: Disoriented to situation, Disoriented to time  Insight: Moderate  Impulsive: Mildly  Processing Speed: Delayed    General Assessments:  General Observation  General Observation: bruisisng to L knee post fall               Activity Tolerance  Endurance: Decreased tolerance for upright activites  Early Mobility/Exercise Safety Screen: Proceed with mobilization - No exclusion criteria met  Activity Tolerance Comments: Decreased functional activity tolerance, poor, fatigues quickly and requires frequent rest break    Sensation  Light Touch: No apparent deficits (able to localize light touch B LE, denies paresthesias B UE and B LE)    Strength  Strength Comments: B LE weakness noted 3+/5 hip,  knee,ankle  Coordination  Movements are Fluid and Coordinated: No  Heel to Shin: Impaired  Coordination Comment: slow and effortful movement post fall    Postural Control  Postural Control: Within Functional Limits  Posture Comment: forward head, rounded shoulders, forward flexed    Static Sitting Balance  Static Sitting-Balance Support: No upper extremity supported, Feet unsupported  Static Sitting-Level of Assistance: Close supervision  Static Sitting-Comment/Number of Minutes: 8 min  Dynamic Sitting Balance  Dynamic Sitting-Balance Support: Bilateral upper extremity supported, Feet supported  Dynamic Sitting-Balance: Lateral lean  Dynamic Sitting-Comments: close supervision    Static Standing Balance  Static Standing-Balance Support: Bilateral upper extremity supported  Static Standing-Level of Assistance: Moderate assistance  Dynamic Standing Balance  Dynamic Standing-Balance Support: Bilateral upper extremity supported (on RW)  Dynamic Standing-Balance: Turning  Dynamic Standing-Comments: mod Ax1  Functional Assessments:  Bed Mobility 1  Bed Mobility 1: Supine to sitting  Level of Assistance 1: Moderate assistance, Moderate verbal cues  Bed Mobility Comments 1: attempts to use hand rail, vc and tactile cues for rolling, assist for trunk upand B LE out of bed    Transfer 1  Transfer From 1: Bed to  Transfer to 1: Stand  Technique 1: Sit to stand, Stand to sit  Transfer Device 1: Walker  Transfer Level of Assistance 1: Moderate assistance, Moderate verbal cues  Trials/Comments 1: cues for safe hand placement  Transfers 2  Transfer From 2: Stand to  Transfer to 2: Chair with arms  Technique 2: Stand to sit  Transfer Level of Assistance 2: Moderate assistance, Maximum verbal cues  Trials/Comments 2: cues and assist for safe turn to sit    Ambulation/Gait Training 1  Surface 1: Level tile  Device 1: Rolling walker  Assistance 1: Moderate assistance  Quality of Gait 1: Diminished heel strike, Inconsistent stride  length, Soft knee(s), Forward flexed posture  Comments/Distance (ft) 1: 15 ft around bed with RW mod A x 1, cues for upright posture, safe walker positioning, and safe turn to sit to chair. Patient is unsteady and fatigues quickly.  Extremity/Trunk Assessments:  RLE   RLE : Exceptions to WFL (ROM grossly WFL, strength 3+/5 hip, knee, ankle)  LLE   LLE :  (ROM grossly WFL, 3+/5 hip, knee, ankle)  Treatments: seated activity at edge of bed reaching for gown at end of bed with close Supervision for safety, no UE or no LE support     Outcome Measures:  [facility] Basic Mobility  Turning from your back to your side while in a flat bed without using bedrails: A little  Moving from lying on your back to sitting on the side of a flat bed without using bedrails: A lot  Moving to and from bed to chair (including a wheelchair): A lot  Standing up from a chair using your arms (e.g. wheelchair or bedside chair): A lot  To walk in hospital room: A lot  Climbing 3-5 steps with railing: A lot  Basic Mobility - Total Score: 13    Encounter Problems       Encounter Problems (Active)       PT Goals       Patient will transfer bed to chair and chair to bed with supervision assist to facilitate mobility.  (Progressing)       Start:  07/06/24    Expected End:  07/20/24            Patient will transfer supine to sit and sit to supine with supervision assist to facilitate mobility.  (Progressing)       Start:  07/06/24    Expected End:  07/20/24            Patient will amb 100 feet with rolling walker device including two turns on even surface with contact guard assist to facilitate safe mobility.  (Progressing)       Start:  07/06/24    Expected End:  07/20/24            Patient will improve standing dynamic balance to Fair+ for safety during ambulation with rolling walker.  (Progressing)       Start:  07/06/24    Expected End:  07/20/24               Pain - Adult              Education Documentation  Mobility Training, taught by Linda BRUSH  LEONARDO Keyes at 7/6/2024  8:53 AM.  Learner: Patient  Readiness: Acceptance  Method: Explanation  Response: Verbalizes Understanding, Needs Reinforcement  Comment: education re: safe mobility, fall risk    Education Comments  No comments found.

## 2024-07-06 NOTE — CARE PLAN
The patient's goals for the shift include  antibiotics, safety, rest.     The clinical goals for the shift include Maintain pt safety/comfort; monitor labs/vitals/tele; no falls, promote rest    No barriers to meeting these goals.       Problem: Pain  Goal: Takes deep breaths with improved pain control throughout the shift  Outcome: Progressing  Goal: Turns in bed with improved pain control throughout the shift  Outcome: Progressing  Goal: Walks with improved pain control throughout the shift  Outcome: Progressing  Goal: Performs ADL's with improved pain control throughout shift  Outcome: Progressing  Goal: Participates in PT with improved pain control throughout the shift  Outcome: Progressing  Goal: Free from opioid side effects throughout the shift  Outcome: Progressing  Goal: Free from acute confusion related to pain meds throughout the shift  Outcome: Progressing     Problem: Skin  Goal: Decreased wound size/increased tissue granulation at next dressing change  Outcome: Progressing  Goal: Participates in plan/prevention/treatment measures  Outcome: Progressing  Goal: Prevent/manage excess moisture  Outcome: Progressing  Goal: Prevent/minimize sheer/friction injuries  Outcome: Progressing  Goal: Promote/optimize nutrition  Outcome: Progressing  Goal: Promote skin healing  Outcome: Progressing     Problem: Pain - Adult  Goal: Verbalizes/displays adequate comfort level or baseline comfort level  Outcome: Progressing     Problem: Safety - Adult  Goal: Free from fall injury  Outcome: Progressing     Problem: Discharge Planning  Goal: Discharge to home or other facility with appropriate resources  Outcome: Progressing     Problem: Chronic Conditions and Co-morbidities  Goal: Patient's chronic conditions and co-morbidity symptoms are monitored and maintained or improved  Outcome: Progressing     Problem: Fall/Injury  Goal: Not fall by end of shift  Outcome: Progressing  Goal: Be free from injury by end of the  shift  Outcome: Progressing  Goal: Verbalize understanding of personal risk factors for fall in the hospital  Outcome: Progressing  Goal: Verbalize understanding of risk factor reduction measures to prevent injury from fall in the home  Outcome: Progressing  Goal: Use assistive devices by end of the shift  Outcome: Progressing  Goal: Pace activities to prevent fatigue by end of the shift  Outcome: Progressing

## 2024-07-07 ENCOUNTER — APPOINTMENT (OUTPATIENT)
Dept: RADIOLOGY | Facility: HOSPITAL | Age: 84
DRG: 565 | End: 2024-07-07
Payer: MEDICARE

## 2024-07-07 VITALS
BODY MASS INDEX: 23.56 KG/M2 | TEMPERATURE: 96.6 F | HEIGHT: 65 IN | RESPIRATION RATE: 14 BRPM | OXYGEN SATURATION: 98 % | SYSTOLIC BLOOD PRESSURE: 142 MMHG | WEIGHT: 141.4 LBS | HEART RATE: 68 BPM | DIASTOLIC BLOOD PRESSURE: 95 MMHG

## 2024-07-07 PROBLEM — I65.22 STENOSIS OF LEFT CAROTID ARTERY: Status: ACTIVE | Noted: 2024-07-07

## 2024-07-07 LAB
ANION GAP SERPL CALC-SCNC: 11 MMOL/L
BACTERIA UR CULT: ABNORMAL
BUN SERPL-MCNC: 17 MG/DL (ref 8–25)
CALCIUM SERPL-MCNC: 8.6 MG/DL (ref 8.5–10.4)
CHLORIDE SERPL-SCNC: 110 MMOL/L (ref 97–107)
CK SERPL-CCNC: 657 U/L (ref 24–195)
CO2 SERPL-SCNC: 20 MMOL/L (ref 24–31)
CREAT SERPL-MCNC: 1.1 MG/DL (ref 0.4–1.6)
EGFRCR SERPLBLD CKD-EPI 2021: 49 ML/MIN/1.73M*2
ERYTHROCYTE [DISTWIDTH] IN BLOOD BY AUTOMATED COUNT: 13.6 % (ref 11.5–14.5)
GLUCOSE SERPL-MCNC: 102 MG/DL (ref 65–99)
HCT VFR BLD AUTO: 36.7 % (ref 36–46)
HGB BLD-MCNC: 11.6 G/DL (ref 12–16)
MCH RBC QN AUTO: 31.3 PG (ref 26–34)
MCHC RBC AUTO-ENTMCNC: 31.6 G/DL (ref 32–36)
MCV RBC AUTO: 99 FL (ref 80–100)
NRBC BLD-RTO: 0 /100 WBCS (ref 0–0)
PLATELET # BLD AUTO: 289 X10*3/UL (ref 150–450)
POTASSIUM SERPL-SCNC: 4.2 MMOL/L (ref 3.4–5.1)
RBC # BLD AUTO: 3.71 X10*6/UL (ref 4–5.2)
SODIUM SERPL-SCNC: 141 MMOL/L (ref 133–145)
WBC # BLD AUTO: 9.7 X10*3/UL (ref 4.4–11.3)

## 2024-07-07 PROCEDURE — 99223 1ST HOSP IP/OBS HIGH 75: CPT | Performed by: NURSE PRACTITIONER

## 2024-07-07 PROCEDURE — 82550 ASSAY OF CK (CPK): CPT | Performed by: INTERNAL MEDICINE

## 2024-07-07 PROCEDURE — 70551 MRI BRAIN STEM W/O DYE: CPT

## 2024-07-07 PROCEDURE — 85027 COMPLETE CBC AUTOMATED: CPT | Performed by: INTERNAL MEDICINE

## 2024-07-07 PROCEDURE — 2500000001 HC RX 250 WO HCPCS SELF ADMINISTERED DRUGS (ALT 637 FOR MEDICARE OP): Performed by: INTERNAL MEDICINE

## 2024-07-07 PROCEDURE — 70551 MRI BRAIN STEM W/O DYE: CPT | Performed by: RADIOLOGY

## 2024-07-07 PROCEDURE — 80048 BASIC METABOLIC PNL TOTAL CA: CPT | Performed by: INTERNAL MEDICINE

## 2024-07-07 PROCEDURE — C9113 INJ PANTOPRAZOLE SODIUM, VIA: HCPCS | Performed by: INTERNAL MEDICINE

## 2024-07-07 PROCEDURE — 1100000001 HC PRIVATE ROOM DAILY

## 2024-07-07 PROCEDURE — 2500000004 HC RX 250 GENERAL PHARMACY W/ HCPCS (ALT 636 FOR OP/ED): Performed by: INTERNAL MEDICINE

## 2024-07-07 PROCEDURE — 36415 COLL VENOUS BLD VENIPUNCTURE: CPT | Performed by: INTERNAL MEDICINE

## 2024-07-07 PROCEDURE — 99232 SBSQ HOSP IP/OBS MODERATE 35: CPT | Performed by: INTERNAL MEDICINE

## 2024-07-07 PROCEDURE — 2500000002 HC RX 250 W HCPCS SELF ADMINISTERED DRUGS (ALT 637 FOR MEDICARE OP, ALT 636 FOR OP/ED): Performed by: INTERNAL MEDICINE

## 2024-07-07 ASSESSMENT — COGNITIVE AND FUNCTIONAL STATUS - GENERAL
DRESSING REGULAR UPPER BODY CLOTHING: A LITTLE
MOVING TO AND FROM BED TO CHAIR: A LOT
PERSONAL GROOMING: A LITTLE
STANDING UP FROM CHAIR USING ARMS: A LOT
CLIMB 3 TO 5 STEPS WITH RAILING: A LOT
MOBILITY SCORE: 13
EATING MEALS: A LITTLE
TURNING FROM BACK TO SIDE WHILE IN FLAT BAD: A LOT
DRESSING REGULAR LOWER BODY CLOTHING: A LOT
TOILETING: A LOT
MOVING FROM LYING ON BACK TO SITTING ON SIDE OF FLAT BED WITH BEDRAILS: A LITTLE
WALKING IN HOSPITAL ROOM: A LOT
DAILY ACTIVITIY SCORE: 16
HELP NEEDED FOR BATHING: A LITTLE

## 2024-07-07 ASSESSMENT — PAIN - FUNCTIONAL ASSESSMENT
PAIN_FUNCTIONAL_ASSESSMENT: 0-10

## 2024-07-07 ASSESSMENT — PAIN SCALES - GENERAL
PAINLEVEL_OUTOF10: 0 - NO PAIN
PAINLEVEL_OUTOF10: 0 - NO PAIN
PAINLEVEL_OUTOF10: 1
PAINLEVEL_OUTOF10: 3

## 2024-07-07 NOTE — CARE PLAN
The patient's goals for the shift include      The clinical goals for the shift include patient comfort

## 2024-07-07 NOTE — CARE PLAN
Problem: Pain  Goal: Takes deep breaths with improved pain control throughout the shift  7/7/2024 0721 by Vivek Maravilla RN  Outcome: Progressing  7/7/2024 0721 by Vivek Maravilla RN  Outcome: Progressing  Goal: Turns in bed with improved pain control throughout the shift  7/7/2024 0721 by Vivek Maravilla RN  Outcome: Progressing  7/7/2024 0721 by Vivek Maravilla RN  Outcome: Progressing  Goal: Walks with improved pain control throughout the shift  7/7/2024 0721 by Vivek Maravilla RN  Outcome: Progressing  7/7/2024 0721 by Vivek Maravilla RN  Outcome: Progressing  Goal: Performs ADL's with improved pain control throughout shift  7/7/2024 0721 by Vivek Maravilla RN  Outcome: Progressing  7/7/2024 0721 by Vivek Maravilla RN  Outcome: Progressing  Goal: Participates in PT with improved pain control throughout the shift  7/7/2024 0721 by Vivek Maravilla RN  Outcome: Progressing  7/7/2024 0721 by Vivek Maravilla RN  Outcome: Progressing  Goal: Free from opioid side effects throughout the shift  7/7/2024 0721 by Vivek Maravilla RN  Outcome: Progressing  7/7/2024 0721 by Vivek Maravilla RN  Outcome: Progressing  Goal: Free from acute confusion related to pain meds throughout the shift  7/7/2024 0721 by Vivek Maravilla RN  Outcome: Progressing  7/7/2024 0721 by Vivek Maravilla RN  Outcome: Progressing     Problem: Skin  Goal: Decreased wound size/increased tissue granulation at next dressing change  7/7/2024 0721 by Vivek Maravilla RN  Outcome: Progressing  Flowsheets (Taken 7/7/2024 0721)  Decreased wound size/increased tissue granulation at next dressing change: Promote sleep for wound healing  7/7/2024 0721 by Vivek Maravilla RN  Outcome: Progressing  Flowsheets (Taken 7/7/2024 0721)  Decreased wound size/increased tissue granulation at next dressing change: Promote sleep for wound healing  Goal: Participates in plan/prevention/treatment measures  7/7/2024 0721 by Vivek Maravilla RN  Outcome: Progressing  Flowsheets (Taken 7/7/2024 0721)  Participates in plan/prevention/treatment  measures: Discuss with provider PT/OT consult  7/7/2024 0721 by Vivek Maravilla RN  Outcome: Progressing  Flowsheets (Taken 7/7/2024 0721)  Participates in plan/prevention/treatment measures: Discuss with provider PT/OT consult  Goal: Prevent/manage excess moisture  7/7/2024 0721 by Vivek Maravilla RN  Outcome: Progressing  Flowsheets (Taken 7/7/2024 0721)  Prevent/manage excess moisture: Moisturize dry skin  7/7/2024 0721 by Vivek Maravilla RN  Outcome: Progressing  Flowsheets (Taken 7/7/2024 0721)  Prevent/manage excess moisture: Moisturize dry skin  Goal: Prevent/minimize sheer/friction injuries  7/7/2024 0721 by Vivek Maravilla RN  Outcome: Progressing  Flowsheets (Taken 7/7/2024 0721)  Prevent/minimize sheer/friction injuries: Increase activity/out of bed for meals  7/7/2024 0721 by Vivek Maravilla RN  Outcome: Progressing  Flowsheets (Taken 7/7/2024 0721)  Prevent/minimize sheer/friction injuries: Increase activity/out of bed for meals  Goal: Promote/optimize nutrition  7/7/2024 0721 by Vivek Maravilla RN  Outcome: Progressing  Flowsheets (Taken 7/7/2024 0721)  Promote/optimize nutrition: Assist with feeding  7/7/2024 0721 by Vivek Maarvilla RN  Outcome: Progressing  Flowsheets (Taken 7/7/2024 0721)  Promote/optimize nutrition: Assist with feeding  Goal: Promote skin healing  7/7/2024 0721 by Vivek Maravilla RN  Outcome: Progressing  Flowsheets (Taken 7/7/2024 0721)  Promote skin healing: Protective dressings over bony prominences  7/7/2024 0721 by Vivek Maravilla RN  Outcome: Progressing  Flowsheets (Taken 7/7/2024 0721)  Promote skin healing: Protective dressings over bony prominences     Problem: Pain - Adult  Goal: Verbalizes/displays adequate comfort level or baseline comfort level  7/7/2024 0721 by Vivek Maravilla RN  Outcome: Progressing  7/7/2024 0721 by Vivek Maravilla RN  Outcome: Progressing     Problem: Safety - Adult  Goal: Free from fall injury  7/7/2024 0721 by Vivek Maravilla RN  Outcome: Progressing  7/7/2024 0721 by Vivek Maravilla,  RN  Outcome: Progressing     Problem: Discharge Planning  Goal: Discharge to home or other facility with appropriate resources  7/7/2024 0721 by Vivek Maravilla RN  Outcome: Progressing  7/7/2024 0721 by Vivek Maravilla RN  Outcome: Progressing     Problem: Chronic Conditions and Co-morbidities  Goal: Patient's chronic conditions and co-morbidity symptoms are monitored and maintained or improved  7/7/2024 0721 by Vivek Maravilla RN  Outcome: Progressing  7/7/2024 0721 by Vivek Maravilla RN  Outcome: Progressing     Problem: Fall/Injury  Goal: Not fall by end of shift  7/7/2024 0721 by Vivek Maravilla RN  Outcome: Progressing  7/7/2024 0721 by Vivek Maravilla RN  Outcome: Progressing  Goal: Be free from injury by end of the shift  7/7/2024 0721 by Vivek Maravilla RN  Outcome: Progressing  7/7/2024 0721 by Vivek Maravilla RN  Outcome: Progressing  Goal: Verbalize understanding of personal risk factors for fall in the hospital  7/7/2024 0721 by Vivek Maravilla RN  Outcome: Progressing  7/7/2024 0721 by Vivek Maravilla RN  Outcome: Progressing  Goal: Verbalize understanding of risk factor reduction measures to prevent injury from fall in the home  7/7/2024 0721 by Vivek Maravilla RN  Outcome: Progressing  7/7/2024 0721 by Vivek Maravilla RN  Outcome: Progressing  Goal: Use assistive devices by end of the shift  7/7/2024 0721 by Vivek Maravilla RN  Outcome: Progressing  7/7/2024 0721 by Vivek Maravilla RN  Outcome: Progressing  Goal: Pace activities to prevent fatigue by end of the shift  7/7/2024 0721 by Vivek Maravilla RN  Outcome: Progressing  7/7/2024 0721 by Vivek Maravilla RN  Outcome: Progressing

## 2024-07-07 NOTE — CARE PLAN
The patient's goals for the shift include  safety and rest    The clinical goals for the shift include Maintain pt safety/comfort; monitor labs/vitals/tele; no falls, promote rest      07/06/24 at 10:20 PM - Traci Godoy RN

## 2024-07-07 NOTE — CONSULTS
Reason For Consult  Carotid stenosis    History Of Present Illness  Riya Hernandez is a 83 y.o. female presenting with a past medical history of Alzheimer's, chest pain, hyperlipidemia, hypertension.  Patient is a very difficult historian and all of the history was taken from the chart.  The patient was found in her hallway by her daughter at around 4:30 AM.  Subsequently, the patient had a second fall.  Unclear if there was loss of consciousness.  The patient has been worked up for stroke and found to have 50 to 69% bilateral internal carotid artery stenosis.  Of note, MRI brain was negative.     Past Medical History  She has a past medical history of Alzheimer disease (Multi), Anxiety, Arrhythmia, Chest pain, Coronary artery disease, Delirium, Delirium, Dementia (Multi), Hyperlipidemia, Hypertension, Lightheadedness, Myocardial infarction (Multi), and Near syncope.    Surgical History  She has a past surgical history that includes Cardiac catheterization and Coronary stent placement.     Social History  She reports that she has been smoking cigarettes. She has a 2.5 pack-year smoking history. She has never been exposed to tobacco smoke. She has never used smokeless tobacco. She reports that she does not drink alcohol and does not use drugs.    Family History  Family History   Problem Relation Name Age of Onset    Heart disease Mother      Other (triple bypass) Mother      Heart disease Brother      Sudden death Brother          cardiac death        Allergies  Patient has no known allergies.    Review of Systems  Unable to take as patient is a very poor historian     Physical Exam  General: Pt is alert and oriented x 1-2 (self, place). Pleasant, conversive  HEENT: Head is atraumatic, normocephalic.  Cardiac: Normal S1-S2.  Regular rate and rhythm.  No murmurs.  Respiratory: Lungs clear to auscultation.  No adventitious sounds.  Abdomen: Soft, nondistended, nontender.  Bowel sounds x4 quadrants.  Pulse exam:  "Palpable brachial and radial pulses bilaterall.  Lower extremities warm and well-perfused.  Extremities: No significant edema noted.  Extremities are warm to the touch and normal in color.  No open wounds or sores.  Neuro: Moves all extremities spontaneously.  No focal deficits.   strength 5+ bilaterally.  Dorsiflexion and plantarflexion 5+ bilaterally.  Tongue is midline.  Smile is symmetrical.  Psych: Appropriate affect.  Answers questions appropriately.     Last Recorded Vitals  Blood pressure 145/51, pulse 61, temperature 36.3 °C (97.3 °F), temperature source Temporal, resp. rate 16, height 1.651 m (5' 5\"), weight 64.1 kg (141 lb 6.4 oz), SpO2 96%.    Relevant Results    Scheduled medications  aspirin, 81 mg, oral, BID  atorvastatin, 80 mg, oral, Nightly  cefTRIAXone, 1 g, intravenous, q24h  dilTIAZem CD, 240 mg, oral, Daily  docusate sodium, 100 mg, oral, BID  donepezil, 10 mg, oral, Nightly  enoxaparin, 40 mg, subcutaneous, q24h  losartan, 25 mg, oral, Daily  memantine, 5 mg, oral, Daily  metoprolol tartrate, 25 mg, oral, Daily  pantoprazole, 40 mg, oral, Daily before breakfast   Or  pantoprazole, 40 mg, intravenous, Daily before breakfast  polyethylene glycol, 17 g, oral, Daily  ticagrelor, 90 mg, oral, BID      Continuous medications  potassium gpauvwa-F9-9.45%NaCl, 100 mL/hr, Last Rate: 100 mL/hr (07/07/24 1258)      PRN medications  PRN medications: acetaminophen **OR** acetaminophen **OR** acetaminophen, benzocaine-menthol, dextromethorphan-guaifenesin, guaiFENesin, hydrOXYzine pamoate, melatonin, [DISCONTINUED] ondansetron **OR** ondansetron, ondansetron ODT     MR brain wo IV contrast    Result Date: 7/7/2024  Interpreted By:  Edmond Stevenson, STUDY: MR BRAIN WO IV CONTRAST;  7/7/2024 9:42 am   INDICATION: Signs/Symptoms:Confusion.   COMPARISON: None.   ACCESSION NUMBER(S): [id number]   ORDERING CLINICIAN: GENARO OCAMPO   TECHNIQUE: Axial T2, FLAIR, DWI, gradient echo T2 and sagittal and coronal T1 " weighted images of brain were acquired.   FINDINGS: No restricted diffusion to suggest recent ischemia. No acute intracranial hemorrhage mass effect or midline shift. There is mild global parenchymal volume loss with consequent ex vacuo prominence of the ventricular system sulci and cisterns. No pathologic parenchymal increased susceptibility. No abnormality of the major intracranial flow voids was identified with diminutive appearance of the distal left vertebral artery noted. Moderate nonspecific periventricular greater than subcortical T2 FLAIR hyperintensities most likely reflecting chronic small vessel ischemic changes given the age of the patient. Visualized paranasal sinuses and mastoid air cells are clear.       No acute intracranial abnormality was identified. Nonspecific white matter changes most likely reflect chronic small vessel ischemic disease given the age of the patient.   MACRO: None   Signed by: Edmond Stevenson 7/7/2024 9:53 AM Dictation workstation:   [id number]    Carotid duplex bilateral    Result Date: 7/6/2024  Interpreted By:  Lui Mccoy, STUDY: [id number]; 7/5/2024 7:33 pm   INDICATION: Signs/Symptoms:Confusion :   COMPARISON: None.   ACCESSION NUMBER(S): [id number]   ORDERING CLINICIAN: GENARO OCAMPO   TECHNIQUE: Carotid Examination using B-mode, color flow and doppler spectral analysis.   FINDINGS: RIGHT CAROTID SYSTEM DCCA PSV/EDV: 64.8 cm/s / 11.9 cm/s cm/sec ECA PSV: 78.7 cm/s cm/sec PICA PSV/EDV: 89.3 / 18.1 cm/sec ARON PSV/EDV: 103.9 cm/s / 19.9 cm/s cm/sec DICA PSV/EDV: 100.2 cm/s / 18.1 cm/s cm/sec EULALIA/VCC Ratio: 1.4 VERT : Antegrade   LEFT CAROTID SYSTEM DCCA PSV/EDV: 69.8 cm/s,17.1 cm/s / 17.1 cm/s cm/sec ECA PSV: 155.6 cm/s cm/sec PICA PSV/EDV:  /  cm/sec ARON PSV/EDV: 145 / 26 cm/sec DICA PSV/EDV:  / 37.3 cm/s cm/sec EULALIA/VCC Ratio: 3.5 VERT : Antegrade   DUPLEX IMAGES: Right Carotid System: Less than 50% stenosis of the right ICA.   Left Carotid System: 50-69% stenosis of the left  ICA.   The estimate of the degree of stenosis included in this report is based on the NASCET method for calculating stenosis, using the internal carotid artery distal to the stenosis as the reference point.       50-69% stenosis of the left ICA and less than 50% stenosis of the right ICA due to atherosclerotic plaque bilaterally.   MACRO: None.   Signed by: Lui Mccoy 7/6/2024 11:56 AM Dictation workstation:   [id number]    ECG 12 lead    Result Date: 7/5/2024  Normal sinus rhythm Nonspecific ST and T wave abnormality Abnormal ECG When compared with ECG of 19-DEC-2023 15:09, Inverted T waves have replaced nonspecific T wave abnormality in Inferior leads Inverted T waves have replaced nonspecific T wave abnormality in Anterior leads Confirmed by Alber Blue ([id number]) on 7/5/2024 5:21:45 PM    XR knee left 4+ views    Result Date: 7/5/2024  Interpreted By:  Lui Mccoy, STUDY: XR KNEE LEFT 4+ VIEWS; 7/5/2024 12:13 pm   INDICATION: Signs/Symptoms:left knee pain after fall   COMPARISON: None.   ACCESSION NUMBER(S): [id number]   ORDERING CLINICIAN: GLEN QUINTEROS   TECHNIQUE: Number of films: Four view radiographs of the left knee.   FINDINGS: No fractures or destructive lesions are identified. The joint spaces and articular surfaces are maintained considering patient's age. The alignment is anatomic. Vascular calcifications are noted. There is no significant effusion in the suprapatellar bursa.       No acute process.   Signed by: Lui Mccoy 7/5/2024 12:49 PM Dictation workstation:   [id number]    XR chest 1 view    Result Date: 7/5/2024  Interpreted By:  Lui Mccoy, STUDY: XR CHEST 1 VIEW; 7/5/2024 12:13 pm   INDICATION: Signs/Symptoms:weakness   COMPARISON: December 2023   ACCESSION NUMBER(S): [id number]   ORDERING CLINICIAN: GLEN QUINTEROS   FINDINGS: The study is limited due to rotation. The cardiomediastinal silhouette is within normal limits for the technique. Calcifications involve  the tortuous aorta. There is mild prominence of the interstitial markings bilaterally. There is no pneumothorax, confluent infiltrates or significant effusion. The osseous structures are unchanged.       Allowing for the aforementioned limitation, no acute cardiopulmonary disease.   Signed by: Lui Mccoy 7/5/2024 12:47 PM Dictation workstation:   [id number]    CT cervical spine wo IV contrast    Result Date: 7/5/2024  Interpreted By:  Rei Issa, STUDY: CT CERVICAL SPINE WO IV CONTRAST;  7/5/2024 12:05 pm   INDICATION: Signs/Symptoms:fall.   COMPARISON: CT C-spine 06/14/2022.   ACCESSION NUMBER(S): [id number]   ORDERING CLINICIAN: GLEN QUINTEROS   TECHNIQUE: Thin section axial images were obtained from the skull base down through the thoracic inlet. Sagittal and coronal reconstruction images were generated. Soft tissue, lung, and bone windows were reviewed.   FINDINGS: Prevertebral/Paraspinal Soft Tissues: No acute abnormalities.   CERVICAL SPINE: Hardware: None. Fracture: None. Vertebral Body Heights: Normal. Alignment: No traumatic listhesis. Spinal canal and neural foramina: Unchanged moderate to severe canal stenosis at C3-C4, C5-C6 and C6-C7. Unchanged severe neural foramina narrowing at C4-C5 bilaterally and C5-C6 bilaterally.       No acute fracture or traumatic malalignment.   Signed by: Rei Issa 7/5/2024 12:15 PM Dictation workstation:   [id number]    CT head wo IV contrast    Result Date: 7/5/2024  Interpreted By:  Rei Issa, STUDY: CT HEAD WO IV CONTRAST;  7/5/2024 12:05 pm   INDICATION: Signs/Symptoms:fall, no LOC.   COMPARISON: CT head 06/14/2022.   ACCESSION NUMBER(S): [id number]   ORDERING CLINICIAN: GLEN QUINTEROS   TECHNIQUE: Noncontrast axial CT scan of head was performed.   FINDINGS: Parenchyma: No intracranial hemorrhage. The grey-white differentiation is intact. No mass effect or midline shift. Patchy periventricular white matter hypodensities, likely chronic  microvascular ischemic change. Moderate diffuse parenchymal atrophy.   CSF Spaces: The ventricles, sulci and basal cisterns are within normal limits for age.   Extra-Axial Fluid: No extraaxial fluid collection.   Calvarium: No acute fracture.   Paranasal sinuses: Visualized paranasal sinuses are clear.   Mastoids: Clear.   Orbits: No acute abnormality.   Soft tissues: No acute abnormality.       No acute intracranial hemorrhage or calvarial fracture.   MACRO None   Signed by: Rei Issa 7/5/2024 12:10 PM Dictation workstation:   [id number]    Assessment/Plan   Carotid stenosis  Hyperlipidemia  Fall    Carotid stenosis I reviewed the results of the carotid duplex which showed 50 to 69% stenosis of the left ICA.-Less than 50% stenosis of the right ICA.  The patient peak systolic velocities on the left are 145 over 26 cm/s in the mid ICA.  ICA to CCA ratio is 3.5 on the left.  Of note, patient MRI brain negative for any acute infarct.  I recommend conservative management for this patient and follow-up duplex and 1 year for reevaluation.  Continue current medical management including aspirin, statin, ticagrelor.  Do not feel that her carotid stenosis is in any way contributing to her current clinical status    I spent 55 minutes in the professional and overall care of this patient.      Galindo Franco, APRN-CNP

## 2024-07-07 NOTE — PROGRESS NOTES
Riya Hernandez is a 83 y.o. female on day 2 of admission presenting with Fall, initial encounter.      Subjective   Remains pleasantly confused       Objective     Last Recorded Vitals  /56 (BP Location: Right arm, Patient Position: Lying)   Pulse 57   Temp 36.3 °C (97.3 °F) (Temporal)   Resp 15   Wt 64.1 kg (141 lb 6.4 oz)   SpO2 98%   Intake/Output last 3 Shifts:    Intake/Output Summary (Last 24 hours) at 7/7/2024 1828  Last data filed at 7/7/2024 1650  Gross per 24 hour   Intake 2719.33 ml   Output --   Net 2719.33 ml       Admission Weight  Weight: 64.1 kg (141 lb 6.4 oz) (07/05/24 1112)    Daily Weight  07/05/24 : 64.1 kg (141 lb 6.4 oz)    Image Results  MR brain wo IV contrast  Narrative: Interpreted By:  Edmond Stevenson,   STUDY:  MR BRAIN WO IV CONTRAST;  7/7/2024 9:42 am      INDICATION:  Signs/Symptoms:Confusion.      COMPARISON:  None.      ACCESSION NUMBER(S):  [id number]      ORDERING CLINICIAN:  GENARO OCAMPO      TECHNIQUE:  Axial T2, FLAIR, DWI, gradient echo T2 and sagittal and coronal T1  weighted images of brain were acquired.      FINDINGS:  No restricted diffusion to suggest recent ischemia. No acute  intracranial hemorrhage mass effect or midline shift. There is mild  global parenchymal volume loss with consequent ex vacuo prominence of  the ventricular system sulci and cisterns. No pathologic parenchymal  increased susceptibility. No abnormality of the major intracranial  flow voids was identified with diminutive appearance of the distal  left vertebral artery noted. Moderate nonspecific periventricular  greater than subcortical T2 FLAIR hyperintensities most likely  reflecting chronic small vessel ischemic changes given the age of the  patient. Visualized paranasal sinuses and mastoid air cells are clear.      Impression: No acute intracranial abnormality was identified. Nonspecific white  matter changes most likely reflect chronic small vessel ischemic  disease given the age of the  patient.      MACRO:  None      Signed by: Edmond Steevnson 7/7/2024 9:53 AM  Dictation workstation:   [id number]      Physical Exam  Vitals reviewed.   Constitutional:       Appearance: Normal appearance.   HENT:      Head: Normocephalic and atraumatic.      Right Ear: Tympanic membrane, ear canal and external ear normal.      Left Ear: Tympanic membrane, ear canal and external ear normal.      Nose: Nose normal.      Mouth/Throat:      Pharynx: Oropharynx is clear.   Eyes:      Extraocular Movements: Extraocular movements intact.      Conjunctiva/sclera: Conjunctivae normal.      Pupils: Pupils are equal, round, and reactive to light.   Cardiovascular:      Rate and Rhythm: Normal rate and regular rhythm.      Pulses: Normal pulses.      Heart sounds: Normal heart sounds.   Pulmonary:      Effort: Pulmonary effort is normal.      Breath sounds: Normal breath sounds.   Abdominal:      General: Abdomen is flat. Bowel sounds are normal.      Palpations: Abdomen is soft.   Musculoskeletal:      Cervical back: Normal range of motion and neck supple.   Skin:     General: Skin is warm and dry.   Neurological:      General: No focal deficit present.      Mental Status: She is alert. She is disoriented.   Psychiatric:         Mood and Affect: Mood normal.         Relevant Results             Scheduled medications  aspirin, 81 mg, oral, BID  atorvastatin, 80 mg, oral, Nightly  cefTRIAXone, 1 g, intravenous, q24h  dilTIAZem CD, 240 mg, oral, Daily  docusate sodium, 100 mg, oral, BID  donepezil, 10 mg, oral, Nightly  enoxaparin, 40 mg, subcutaneous, q24h  losartan, 25 mg, oral, Daily  memantine, 5 mg, oral, Daily  metoprolol tartrate, 25 mg, oral, Daily  pantoprazole, 40 mg, oral, Daily before breakfast   Or  pantoprazole, 40 mg, intravenous, Daily before breakfast  polyethylene glycol, 17 g, oral, Daily  ticagrelor, 90 mg, oral, BID      Continuous medications  potassium mjwozll-A6-9.45%NaCl, 100 mL/hr, Last Rate: 100 mL/hr  (07/07/24 1650)      PRN medications  PRN medications: acetaminophen **OR** acetaminophen **OR** acetaminophen, benzocaine-menthol, dextromethorphan-guaifenesin, guaiFENesin, hydrOXYzine pamoate, melatonin, [DISCONTINUED] ondansetron **OR** ondansetron, ondansetron ODT  Results for orders placed or performed during the hospital encounter of 07/05/24 (from the past 24 hour(s))   CBC   Result Value Ref Range    WBC 9.7 4.4 - 11.3 x10*3/uL    nRBC 0.0 0.0 - 0.0 /100 WBCs    RBC 3.71 (L) 4.00 - 5.20 x10*6/uL    Hemoglobin 11.6 (L) 12.0 - 16.0 g/dL    Hematocrit 36.7 36.0 - 46.0 %    MCV 99 80 - 100 fL    MCH 31.3 26.0 - 34.0 pg    MCHC 31.6 (L) 32.0 - 36.0 g/dL    RDW 13.6 11.5 - 14.5 %    Platelets 289 150 - 450 x10*3/uL   Basic Metabolic Panel   Result Value Ref Range    Glucose 102 (H) 65 - 99 mg/dL    Sodium 141 133 - 145 mmol/L    Potassium 4.2 3.4 - 5.1 mmol/L    Chloride 110 (H) 97 - 107 mmol/L    Bicarbonate 20 (L) 24 - 31 mmol/L    Urea Nitrogen 17 8 - 25 mg/dL    Creatinine 1.10 0.40 - 1.60 mg/dL    eGFR 49 (L) >60 mL/min/1.73m*2    Calcium 8.6 8.5 - 10.4 mg/dL    Anion Gap 11 <=19 mmol/L   Creatine Kinase   Result Value Ref Range    Creatine Kinase 657 (H) 24 - 195 U/L     MR brain wo IV contrast    Result Date: 7/7/2024  Interpreted By:  Edmond Stevenson, STUDY: MR BRAIN WO IV CONTRAST;  7/7/2024 9:42 am   INDICATION: Signs/Symptoms:Confusion.   COMPARISON: None.   ACCESSION NUMBER(S): [id number]   ORDERING CLINICIAN: GENARO OCAMPO   TECHNIQUE: Axial T2, FLAIR, DWI, gradient echo T2 and sagittal and coronal T1 weighted images of brain were acquired.   FINDINGS: No restricted diffusion to suggest recent ischemia. No acute intracranial hemorrhage mass effect or midline shift. There is mild global parenchymal volume loss with consequent ex vacuo prominence of the ventricular system sulci and cisterns. No pathologic parenchymal increased susceptibility. No abnormality of the major intracranial flow voids was identified  with diminutive appearance of the distal left vertebral artery noted. Moderate nonspecific periventricular greater than subcortical T2 FLAIR hyperintensities most likely reflecting chronic small vessel ischemic changes given the age of the patient. Visualized paranasal sinuses and mastoid air cells are clear.       No acute intracranial abnormality was identified. Nonspecific white matter changes most likely reflect chronic small vessel ischemic disease given the age of the patient.   MACRO: None   Signed by: Edmond Stevenson 7/7/2024 9:53 AM Dictation workstation:   [id number]    Carotid duplex bilateral    Result Date: 7/6/2024  Interpreted By:  Lui Mccoy, STUDY: [id number]; 7/5/2024 7:33 pm   INDICATION: Signs/Symptoms:Confusion :   COMPARISON: None.   ACCESSION NUMBER(S): [id number]   ORDERING CLINICIAN: GENARO OCAMPO   TECHNIQUE: Carotid Examination using B-mode, color flow and doppler spectral analysis.   FINDINGS: RIGHT CAROTID SYSTEM DCCA PSV/EDV: 64.8 cm/s / 11.9 cm/s cm/sec ECA PSV: 78.7 cm/s cm/sec PICA PSV/EDV: 89.3 / 18.1 cm/sec ARON PSV/EDV: 103.9 cm/s / 19.9 cm/s cm/sec DICA PSV/EDV: 100.2 cm/s / 18.1 cm/s cm/sec EULALIA/VCC Ratio: 1.4 VERT : Antegrade   LEFT CAROTID SYSTEM DCCA PSV/EDV: 69.8 cm/s,17.1 cm/s / 17.1 cm/s cm/sec ECA PSV: 155.6 cm/s cm/sec PICA PSV/EDV:  /  cm/sec ARON PSV/EDV: 145 / 26 cm/sec DICA PSV/EDV:  / 37.3 cm/s cm/sec EULALIA/VCC Ratio: 3.5 VERT : Antegrade   DUPLEX IMAGES: Right Carotid System: Less than 50% stenosis of the right ICA.   Left Carotid System: 50-69% stenosis of the left ICA.   The estimate of the degree of stenosis included in this report is based on the NASCET method for calculating stenosis, using the internal carotid artery distal to the stenosis as the reference point.       50-69% stenosis of the left ICA and less than 50% stenosis of the right ICA due to atherosclerotic plaque bilaterally.   MACRO: None.   Signed by: Lui Mccoy 7/6/2024 11:56 AM Dictation workstation:    [id number]     Assessment/Plan                  Principal Problem:    Fall, initial encounter  Active Problems:    Anxiety    Coronary artery disease    Hyperlipidemia    Traumatic rhabdomyolysis (CMS-HCC)    UTI (urinary tract infection)    Confusion    Urine culture pending  Continue IV antibiotics  DC IV fluids  Blood pressure stable  Vascular consult for carotid stenosis  COPD stable  PT OT and plan for rehab placement              Trini Regan MD

## 2024-07-08 ENCOUNTER — APPOINTMENT (OUTPATIENT)
Dept: CARDIOLOGY | Facility: HOSPITAL | Age: 84
DRG: 565 | End: 2024-07-08
Payer: MEDICARE

## 2024-07-08 VITALS
TEMPERATURE: 97.9 F | SYSTOLIC BLOOD PRESSURE: 142 MMHG | OXYGEN SATURATION: 98 % | DIASTOLIC BLOOD PRESSURE: 70 MMHG | BODY MASS INDEX: 23.56 KG/M2 | RESPIRATION RATE: 15 BRPM | HEART RATE: 73 BPM | WEIGHT: 141.4 LBS | HEIGHT: 65 IN

## 2024-07-08 LAB
ANION GAP SERPL CALC-SCNC: 11 MMOL/L
AORTIC VALVE PEAK VELOCITY: 1.43 M/S
AV PEAK GRADIENT: 8.2 MMHG
AVA (PEAK VEL): 1.77 CM2
BUN SERPL-MCNC: 15 MG/DL (ref 8–25)
CALCIUM SERPL-MCNC: 9 MG/DL (ref 8.5–10.4)
CHLORIDE SERPL-SCNC: 106 MMOL/L (ref 97–107)
CO2 SERPL-SCNC: 21 MMOL/L (ref 24–31)
CREAT SERPL-MCNC: 1 MG/DL (ref 0.4–1.6)
EGFRCR SERPLBLD CKD-EPI 2021: 55 ML/MIN/1.73M*2
EJECTION FRACTION APICAL 4 CHAMBER: 53.8
EJECTION FRACTION: 63 %
ERYTHROCYTE [DISTWIDTH] IN BLOOD BY AUTOMATED COUNT: 13.5 % (ref 11.5–14.5)
GLUCOSE SERPL-MCNC: 95 MG/DL (ref 65–99)
HCT VFR BLD AUTO: 36.4 % (ref 36–46)
HGB BLD-MCNC: 11.7 G/DL (ref 12–16)
LEFT ATRIUM VOLUME AREA LENGTH INDEX BSA: 16.9 ML/M2
LEFT VENTRICLE INTERNAL DIMENSION DIASTOLE: 4.17 CM (ref 3.5–6)
LEFT VENTRICULAR OUTFLOW TRACT DIAMETER: 1.8 CM
MCH RBC QN AUTO: 30.9 PG (ref 26–34)
MCHC RBC AUTO-ENTMCNC: 32.1 G/DL (ref 32–36)
MCV RBC AUTO: 96 FL (ref 80–100)
MITRAL VALVE E/A RATIO: 0.71
NRBC BLD-RTO: 0 /100 WBCS (ref 0–0)
PLATELET # BLD AUTO: 349 X10*3/UL (ref 150–450)
POTASSIUM SERPL-SCNC: 4.3 MMOL/L (ref 3.4–5.1)
RBC # BLD AUTO: 3.79 X10*6/UL (ref 4–5.2)
RIGHT VENTRICLE FREE WALL PEAK S': 10.3 CM/S
RIGHT VENTRICLE PEAK SYSTOLIC PRESSURE: 30.7 MMHG
SODIUM SERPL-SCNC: 138 MMOL/L (ref 133–145)
TRICUSPID ANNULAR PLANE SYSTOLIC EXCURSION: 2.2 CM
WBC # BLD AUTO: 9.9 X10*3/UL (ref 4.4–11.3)

## 2024-07-08 PROCEDURE — 97530 THERAPEUTIC ACTIVITIES: CPT | Mod: GO,CO

## 2024-07-08 PROCEDURE — 97110 THERAPEUTIC EXERCISES: CPT | Mod: GP,CQ

## 2024-07-08 PROCEDURE — 2500000004 HC RX 250 GENERAL PHARMACY W/ HCPCS (ALT 636 FOR OP/ED): Performed by: INTERNAL MEDICINE

## 2024-07-08 PROCEDURE — 93306 TTE W/DOPPLER COMPLETE: CPT | Performed by: INTERNAL MEDICINE

## 2024-07-08 PROCEDURE — 93306 TTE W/DOPPLER COMPLETE: CPT

## 2024-07-08 PROCEDURE — 2500000001 HC RX 250 WO HCPCS SELF ADMINISTERED DRUGS (ALT 637 FOR MEDICARE OP): Performed by: INTERNAL MEDICINE

## 2024-07-08 PROCEDURE — 80048 BASIC METABOLIC PNL TOTAL CA: CPT | Performed by: INTERNAL MEDICINE

## 2024-07-08 PROCEDURE — 99239 HOSP IP/OBS DSCHRG MGMT >30: CPT | Performed by: INTERNAL MEDICINE

## 2024-07-08 PROCEDURE — 97535 SELF CARE MNGMENT TRAINING: CPT | Mod: GO,CO

## 2024-07-08 PROCEDURE — 36415 COLL VENOUS BLD VENIPUNCTURE: CPT | Performed by: INTERNAL MEDICINE

## 2024-07-08 PROCEDURE — 97116 GAIT TRAINING THERAPY: CPT | Mod: GP,CQ

## 2024-07-08 PROCEDURE — 85027 COMPLETE CBC AUTOMATED: CPT | Performed by: INTERNAL MEDICINE

## 2024-07-08 PROCEDURE — 2500000002 HC RX 250 W HCPCS SELF ADMINISTERED DRUGS (ALT 637 FOR MEDICARE OP, ALT 636 FOR OP/ED): Performed by: INTERNAL MEDICINE

## 2024-07-08 RX ORDER — ACETAMINOPHEN 325 MG/1
650 TABLET ORAL EVERY 4 HOURS PRN
Qty: 30 TABLET | Refills: 0 | Status: SHIPPED | OUTPATIENT
Start: 2024-07-08

## 2024-07-08 RX ORDER — POLYETHYLENE GLYCOL 3350 17 G/17G
17 POWDER, FOR SOLUTION ORAL DAILY
Start: 2024-07-09

## 2024-07-08 RX ORDER — ENOXAPARIN SODIUM 100 MG/ML
40 INJECTION SUBCUTANEOUS EVERY 24 HOURS
Start: 2024-07-08

## 2024-07-08 RX ORDER — PANTOPRAZOLE SODIUM 40 MG/1
40 TABLET, DELAYED RELEASE ORAL
Start: 2024-07-09

## 2024-07-08 RX ORDER — ONDANSETRON 4 MG/1
4 TABLET, ORALLY DISINTEGRATING ORAL EVERY 8 HOURS PRN
Start: 2024-07-08

## 2024-07-08 RX ORDER — DOCUSATE SODIUM 100 MG/1
100 CAPSULE, LIQUID FILLED ORAL 2 TIMES DAILY
Start: 2024-07-08

## 2024-07-08 RX ORDER — CEPHALEXIN 500 MG/1
500 CAPSULE ORAL 3 TIMES DAILY
Start: 2024-07-08

## 2024-07-08 RX ORDER — ONDANSETRON HYDROCHLORIDE 2 MG/ML
4 INJECTION, SOLUTION INTRAVENOUS EVERY 8 HOURS PRN
Start: 2024-07-08

## 2024-07-08 RX ORDER — GUAIFENESIN/DEXTROMETHORPHAN 100-10MG/5
5 SYRUP ORAL EVERY 4 HOURS PRN
Qty: 118 ML | Refills: 0 | Status: SHIPPED | OUTPATIENT
Start: 2024-07-08

## 2024-07-08 RX ORDER — TALC
3 POWDER (GRAM) TOPICAL NIGHTLY PRN
Start: 2024-07-08

## 2024-07-08 RX ORDER — GUAIFENESIN 600 MG/1
600 TABLET, EXTENDED RELEASE ORAL EVERY 12 HOURS PRN
Start: 2024-07-08

## 2024-07-08 SDOH — ECONOMIC STABILITY: FOOD INSECURITY: WITHIN THE PAST 12 MONTHS, THE FOOD YOU BOUGHT JUST DIDN'T LAST AND YOU DIDN'T HAVE MONEY TO GET MORE.: NEVER TRUE

## 2024-07-08 SDOH — HEALTH STABILITY: MENTAL HEALTH
HOW OFTEN DO YOU NEED TO HAVE SOMEONE HELP YOU WHEN YOU READ INSTRUCTIONS, PAMPHLETS, OR OTHER WRITTEN MATERIAL FROM YOUR DOCTOR OR PHARMACY?: SOMETIMES

## 2024-07-08 SDOH — HEALTH STABILITY: MENTAL HEALTH
STRESS IS WHEN SOMEONE FEELS TENSE, NERVOUS, ANXIOUS, OR CAN'T SLEEP AT NIGHT BECAUSE THEIR MIND IS TROUBLED. HOW STRESSED ARE YOU?: NOT AT ALL

## 2024-07-08 SDOH — ECONOMIC STABILITY: INCOME INSECURITY: IN THE PAST 12 MONTHS, HAS THE ELECTRIC, GAS, OIL, OR WATER COMPANY THREATENED TO SHUT OFF SERVICE IN YOUR HOME?: NO

## 2024-07-08 SDOH — SOCIAL STABILITY: SOCIAL NETWORK: HOW OFTEN DO YOU GET TOGETHER WITH FRIENDS OR RELATIVES?: MORE THAN THREE TIMES A WEEK

## 2024-07-08 SDOH — SOCIAL STABILITY: SOCIAL NETWORK
DO YOU BELONG TO ANY CLUBS OR ORGANIZATIONS SUCH AS CHURCH GROUPS UNIONS, FRATERNAL OR ATHLETIC GROUPS, OR SCHOOL GROUPS?: PATIENT DECLINED

## 2024-07-08 SDOH — SOCIAL STABILITY: SOCIAL NETWORK: ARE YOU MARRIED, WIDOWED, DIVORCED, SEPARATED, NEVER MARRIED, OR LIVING WITH A PARTNER?: WIDOWED

## 2024-07-08 SDOH — SOCIAL STABILITY: SOCIAL NETWORK: HOW OFTEN DO YOU ATTENT MEETINGS OF THE CLUB OR ORGANIZATION YOU BELONG TO?: PATIENT DECLINED

## 2024-07-08 SDOH — SOCIAL STABILITY: SOCIAL INSECURITY
WITHIN THE LAST YEAR, HAVE YOU BEEN HUMILIATED OR EMOTIONALLY ABUSED IN OTHER WAYS BY YOUR PARTNER OR EX-PARTNER?: PATIENT DECLINED

## 2024-07-08 SDOH — HEALTH STABILITY: PHYSICAL HEALTH: ON AVERAGE, HOW MANY MINUTES DO YOU ENGAGE IN EXERCISE AT THIS LEVEL?: 0 MIN

## 2024-07-08 SDOH — SOCIAL STABILITY: SOCIAL INSECURITY
WITHIN THE LAST YEAR, HAVE TO BEEN RAPED OR FORCED TO HAVE ANY KIND OF SEXUAL ACTIVITY BY YOUR PARTNER OR EX-PARTNER?: PATIENT DECLINED

## 2024-07-08 SDOH — SOCIAL STABILITY: SOCIAL NETWORK
IN A TYPICAL WEEK, HOW MANY TIMES DO YOU TALK ON THE PHONE WITH FAMILY, FRIENDS, OR NEIGHBORS?: MORE THAN THREE TIMES A WEEK

## 2024-07-08 SDOH — SOCIAL STABILITY: SOCIAL INSECURITY: WITHIN THE LAST YEAR, HAVE YOU BEEN AFRAID OF YOUR PARTNER OR EX-PARTNER?: PATIENT DECLINED

## 2024-07-08 SDOH — ECONOMIC STABILITY: FOOD INSECURITY: WITHIN THE PAST 12 MONTHS, YOU WORRIED THAT YOUR FOOD WOULD RUN OUT BEFORE YOU GOT MONEY TO BUY MORE.: NEVER TRUE

## 2024-07-08 SDOH — HEALTH STABILITY: PHYSICAL HEALTH: ON AVERAGE, HOW MANY DAYS PER WEEK DO YOU ENGAGE IN MODERATE TO STRENUOUS EXERCISE (LIKE A BRISK WALK)?: 0 DAYS

## 2024-07-08 SDOH — SOCIAL STABILITY: SOCIAL NETWORK: HOW OFTEN DO YOU ATTEND CHURCH OR RELIGIOUS SERVICES?: PATIENT DECLINED

## 2024-07-08 SDOH — SOCIAL STABILITY: SOCIAL INSECURITY
WITHIN THE LAST YEAR, HAVE YOU BEEN KICKED, HIT, SLAPPED, OR OTHERWISE PHYSICALLY HURT BY YOUR PARTNER OR EX-PARTNER?: PATIENT DECLINED

## 2024-07-08 ASSESSMENT — COGNITIVE AND FUNCTIONAL STATUS - GENERAL
MOBILITY SCORE: 18
MOVING TO AND FROM BED TO CHAIR: A LITTLE
MOVING FROM LYING ON BACK TO SITTING ON SIDE OF FLAT BED WITH BEDRAILS: A LITTLE
DAILY ACTIVITIY SCORE: 21
MOBILITY SCORE: 18
DRESSING REGULAR UPPER BODY CLOTHING: A LITTLE
MOVING TO AND FROM BED TO CHAIR: A LITTLE
MOVING FROM LYING ON BACK TO SITTING ON SIDE OF FLAT BED WITH BEDRAILS: A LITTLE
WALKING IN HOSPITAL ROOM: A LITTLE
CLIMB 3 TO 5 STEPS WITH RAILING: A LITTLE
STANDING UP FROM CHAIR USING ARMS: A LITTLE
HELP NEEDED FOR BATHING: A LITTLE
STANDING UP FROM CHAIR USING ARMS: A LITTLE
TOILETING: A LITTLE
TOILETING: A LITTLE
HELP NEEDED FOR BATHING: A LITTLE
WALKING IN HOSPITAL ROOM: A LITTLE
TURNING FROM BACK TO SIDE WHILE IN FLAT BAD: A LITTLE
DAILY ACTIVITIY SCORE: 20
DRESSING REGULAR LOWER BODY CLOTHING: A LITTLE
CLIMB 3 TO 5 STEPS WITH RAILING: A LITTLE
DRESSING REGULAR LOWER BODY CLOTHING: A LITTLE
TURNING FROM BACK TO SIDE WHILE IN FLAT BAD: A LITTLE

## 2024-07-08 ASSESSMENT — PAIN DESCRIPTION - ORIENTATION: ORIENTATION: RIGHT

## 2024-07-08 ASSESSMENT — PAIN SCALES - GENERAL
PAINLEVEL_OUTOF10: 1
PAINLEVEL_OUTOF10: 5 - MODERATE PAIN
PAINLEVEL_OUTOF10: 8
PAINLEVEL_OUTOF10: 3

## 2024-07-08 ASSESSMENT — PAIN DESCRIPTION - LOCATION: LOCATION: BACK

## 2024-07-08 ASSESSMENT — PAIN - FUNCTIONAL ASSESSMENT
PAIN_FUNCTIONAL_ASSESSMENT: 0-10

## 2024-07-08 ASSESSMENT — ACTIVITIES OF DAILY LIVING (ADL): HOME_MANAGEMENT_TIME_ENTRY: 25

## 2024-07-08 NOTE — CARE PLAN
Problem: Pain  Goal: Takes deep breaths with improved pain control throughout the shift  Outcome: Progressing  Goal: Turns in bed with improved pain control throughout the shift  Outcome: Progressing  Goal: Walks with improved pain control throughout the shift  Outcome: Progressing  Goal: Performs ADL's with improved pain control throughout shift  Outcome: Progressing  Goal: Participates in PT with improved pain control throughout the shift  Outcome: Progressing  Goal: Free from opioid side effects throughout the shift  Outcome: Progressing  Goal: Free from acute confusion related to pain meds throughout the shift  Outcome: Progressing     Problem: Skin  Goal: Decreased wound size/increased tissue granulation at next dressing change  Outcome: Progressing  Goal: Participates in plan/prevention/treatment measures  Outcome: Progressing  Goal: Prevent/manage excess moisture  Outcome: Progressing  Goal: Prevent/minimize sheer/friction injuries  Outcome: Progressing  Goal: Promote/optimize nutrition  Outcome: Progressing  Goal: Promote skin healing  Outcome: Progressing     Problem: Pain - Adult  Goal: Verbalizes/displays adequate comfort level or baseline comfort level  Outcome: Progressing     Problem: Safety - Adult  Goal: Free from fall injury  Outcome: Progressing     Problem: Discharge Planning  Goal: Discharge to home or other facility with appropriate resources  Outcome: Progressing     Problem: Chronic Conditions and Co-morbidities  Goal: Patient's chronic conditions and co-morbidity symptoms are monitored and maintained or improved  Outcome: Progressing     Problem: Fall/Injury  Goal: Not fall by end of shift  Outcome: Progressing  Goal: Be free from injury by end of the shift  Outcome: Progressing  Goal: Verbalize understanding of personal risk factors for fall in the hospital  Outcome: Progressing  Goal: Verbalize understanding of risk factor reduction measures to prevent injury from fall in the  home  Outcome: Progressing  Goal: Use assistive devices by end of the shift  Outcome: Progressing  Goal: Pace activities to prevent fatigue by end of the shift  Outcome: Progressing   The patient's goals for the shift include      The clinical goals for the shift include safety    Over the shift, the patient did not make progress toward the following goals. Barriers to progression include confusion. Recommendations to address these barriers include reorientation, bed/chair alarm.

## 2024-07-08 NOTE — PROGRESS NOTES
07/08/24 1101   Discharge Planning   Living Arrangements Children   Support Systems Children   Assistance Needed independent   Type of Residence Private residence   Number of Stairs to Enter Residence 3   Number of Stairs Within Residence 0   Do you have animals or pets at home? No   Who is requesting discharge planning? Patient   Home or Post Acute Services Post acute facilities (Rehab/SNF/etc)   Type of Post Acute Facility Services Skilled nursing   Patient expects to be discharged to: SNF- [facility]   Does the patient need discharge transport arranged? Yes   RoundTrip coordination needed? Yes   Has discharge transport been arranged? No   Patient Choice   Patient / Family choosing to utilize agency / facility established prior to hospitalization Yes  ([facility] -close to home)     Spoke with daughter, Pooja [phone], regarding discharge planning.  Agreeable to SNF, therapy recommendations.  Chose [facility], which , is close to them.   Accepted.  Probable discharge today.  Medicare requirements have been met.    1515-  Transport arranged for [facility] - 4:15 pm

## 2024-07-08 NOTE — PROGRESS NOTES
Occupational Therapy    Occupational Therapy Treatment    Name: Riya Hernandez  MRN: [id number]  : 1940  Date: 24  Time Calculation  Start Time: 1031  Stop Time: 1110  Time Calculation (min): 39 min    Assessment:  Evaluation/Treatment Tolerance: Patient limited by fatigue  Medical Staff Made Aware: Yes  End of Session Communication: Bedside nurse  End of Session Patient Position: Up in chair, Alarm on  Plan:  Treatment Interventions: ADL retraining, Functional transfer training, UE strengthening/ROM, Endurance training, Patient/family training, Equipment evaluation/education, Compensatory technique education  OT Frequency: 3 times per week  OT Discharge Recommendations: Moderate intensity level of continued care  Equipment Recommended upon Discharge: Wheeled walker  OT Recommended Transfer Status: Assist of 2  OT - OK to Discharge: Yes    Subjective   Previous Visit Info:  OT Last Visit  OT Received On: 24  General:  General  Prior to Session Communication: Bedside nurse  Patient Position Received: Up in chair, Alarm on  Preferred Learning Style: verbal, visual  General Comment: pleasant and agreeable to therrapy  Precautions:  Precautions Comment: falls  Vitals:     Pain Assessment:  Pain Assessment  Pain Assessment: 0-10  0-10 (Numeric) Pain Score: 5 - Moderate pain  Pain Type: Acute pain  Pain Location: Back (notified nursing of pt pain)     Objective   Cognition:     Activities of Daily Living: Grooming  Grooming Level of Assistance: Setup  Grooming Where Assessed: Chair  Grooming Comments: to brush teeth, wash face and hands    LE Dressing  LE Dressing: Yes  LE Dressing Adaptive Equipment: Other (Comment) (walker to steady when pulling up pants)  LE Dressing Where Assessed: Chair  LE Dressing Comments:  (cues for walker placmement)    Toileting  Toileting Level of Assistance: Minimum assistance  Where Assessed: Toilet  Toileting Comments: to steady when standing to manage  pants    Functional Standing Tolerance:  Functional Standing Tolerance  Time: 3 min during washing hands and face at sink level  Functional Standing Tolerance Comments: cues to keep walker close  Bed Mobility/Transfers: Transfer 1  Transfer From 1: Sit to  Transfer to 1: Stand  Technique 1: Sit to stand  Transfer Device 1: Walker  Transfer Level of Assistance 1: Minimum assistance  Transfers 2  Transfer From 2: Sit to  Transfer to 2: Stand  Technique 2: Sit to stand  Transfer Device 2: Walker  Transfer Level of Assistance 2: Close supervision  Trials/Comments 2: from toilet    Toilet Transfers  Toilet Transfer From: Chair  Toilet Transfer Type: To and from  Toilet Transfer to: Standard bedside commode  Toilet Transfer Technique: Ambulating  Toilet Transfers: Minimal assistance  Toilet Transfers Comments: use of FWW  Standing Balance:no LOB  Outcome Measures:  [facility] Daily Activity  Putting on and taking off regular lower body clothing: A little  Bathing (including washing, rinsing, drying): A little  Putting on and taking off regular upper body clothing: None  Toileting, which includes using toilet, bedpan or urinal: A little  Taking care of personal grooming such as brushing teeth: None  Eating Meals: None  Daily Activity - Total Score: 21        Education Documentation  Handouts, taught by TERESITA Sosa at 7/8/2024 11:07 AM.  Learner: Patient  Readiness: Acceptance  Method: Explanation, Demonstration  Response: Verbalizes Understanding, Needs Reinforcement    Precautions, taught by TERESITA Sosa at 7/8/2024 11:07 AM.  Learner: Patient  Readiness: Acceptance  Method: Explanation, Demonstration  Response: Verbalizes Understanding, Needs Reinforcement    Body Mechanics, taught by TERESITA Sosa at 7/8/2024 11:07 AM.  Learner: Patient  Readiness: Acceptance  Method: Explanation, Demonstration  Response: Verbalizes Understanding, Needs Reinforcement    Home Exercise Program, taught by Ashia Miller  TERESITA Hpokins at 7/8/2024 11:07 AM.  Learner: Patient  Readiness: Acceptance  Method: Explanation, Demonstration  Response: Verbalizes Understanding, Needs Reinforcement    Mobility Training, taught by TERESITA Sosa at 7/8/2024 11:07 AM.  Learner: Patient  Readiness: Acceptance  Method: Explanation, Demonstration  Response: Verbalizes Understanding, Needs Reinforcement    Handouts, taught by TERESITA Sosa at 7/8/2024 11:07 AM.  Learner: Patient  Readiness: Acceptance  Method: Explanation, Demonstration  Response: Verbalizes Understanding, Needs Reinforcement    Body Mechanics, taught by TERESITA Sosa at 7/8/2024 11:07 AM.  Learner: Patient  Readiness: Acceptance  Method: Explanation, Demonstration  Response: Verbalizes Understanding, Needs Reinforcement    Precautions, taught by TERESITA Sosa at 7/8/2024 11:07 AM.  Learner: Patient  Readiness: Acceptance  Method: Explanation, Demonstration  Response: Verbalizes Understanding, Needs Reinforcement    Home Exercise Program, taught by TERESITA Sosa at 7/8/2024 11:07 AM.  Learner: Patient  Readiness: Acceptance  Method: Explanation, Demonstration  Response: Verbalizes Understanding, Needs Reinforcement    ADL Training, taught by TERESITA Sosa at 7/8/2024 11:07 AM.  Learner: Patient  Readiness: Acceptance  Method: Explanation, Demonstration  Response: Verbalizes Understanding, Needs Reinforcement    Education Comments  No comments found.      Goals:  Encounter Problems       Encounter Problems (Active)       OT Goals       ADLs (Progressing)       Start:  07/06/24    Expected End:  07/20/24       Pt will complete bathing, dressing, and toileting tasks independently using adaptive aides and with increased time as needed.         Functional transfers (Progressing)       Start:  07/06/24    Expected End:  07/20/24       Pt will complete toilet, bed, and chair transfers independently using elevated seat heights and bilateral arm supports  as needed.         Activity tolerance (Progressing)       Start:  07/06/24    Expected End:  07/20/24       Pt will tolerate 25 minutes of therapeutic activity in order to increase functional endurance needed for ADLs.

## 2024-07-08 NOTE — SIGNIFICANT EVENT
7000 Physician Certification              As the individual's attending physician , I certify that the above-named patient:    Is being discharged to a nursing facility directly from a hospital after receiving acute patient care at the hospital, and  Requires nursing facility services for the condition for which he/she received care in the hospital, and  Requires fewer than 30 days of nursing facility services, no later than the date of discharge.       I certify that inpatient care is required at the level recommended above. To the best of my knowledge, all information provided about the individual is a true and accurate reflection of the individual's condition.

## 2024-07-08 NOTE — PROGRESS NOTES
Spiritual Care Visit    Clinical Encounter Type  Visited With: Patient  Routine Visit: Follow-up  Continue Visiting: Yes         Values/Beliefs  Spiritual Requests During Hospitalization: Communion today    Sacramental Encounters  Communion: Patient wants communion  Communion Given Indicator: Yes     Fahad Negron

## 2024-07-08 NOTE — PROGRESS NOTES
Physical Therapy    Physical Therapy Treatment    Patient Name: Riya Hernandez  MRN: [id number]  Today's Date: 7/8/2024  Time Calculation  Start Time: 1105  Stop Time: 1130  Time Calculation (min): 25 min    Assessment/Plan   PT Assessment  PT Assessment Results: Decreased strength, Decreased endurance, Impaired balance, Decreased mobility, Decreased coordination, Decreased cognition, Impaired judgement, Decreased safety awareness, Decreased skin integrity  Rehab Prognosis: Good  Barriers to Discharge: falls  End of Session Communication: Bedside nurse  Assessment Comment: Pt limited d/t back pain today; encouragement needed. Pt left in bed w bed alarm on and needs in reach.  End of Session Patient Position: Bed, 2 rail up, Alarm on  PT Plan  Inpatient/Swing Bed or Outpatient: Inpatient  PT Plan  Treatment/Interventions: Bed mobility, Transfer training, Gait training, Therapeutic activity  PT Plan: Ongoing PT  PT Frequency: 4 times per week  PT Discharge Recommendations: Moderate intensity level of continued care  Equipment Recommended upon Discharge: Wheeled walker  PT Recommended Transfer Status: Assist x1, Assistive device (mod A)  PT - OK to Discharge: Yes      General Visit Information:   PT  Visit  PT Received On: 07/08/24  General  Reason for Referral: Impaired ADLs  Referred By: Dr TATIANNA Regan  Past Medical History Relevant to Rehab: CAD, MI, Alzheimer's Dementia, OA L hip, HTN, hypercholesterolemia, HTN, hyperlipidemia  Prior to Session Communication: Bedside nurse  Patient Position Received: Up in chair, Alarm on  Preferred Learning Style: verbal, visual  General Comment: Cleared by nursing to be seen for PT. Pt sitting in chair upon arrival and is agreeable to PT. Pt c/o having 8/10 back pain w movement today.    Subjective     Objective   Pain:  Pain Assessment  Pain Assessment: 0-10  0-10 (Numeric) Pain Score: 8  Pain Type: Acute pain  Pain Location: Back    Treatments:  Therapeutic Exercise  Therapeutic  Exercise Performed: Yes  Therapeutic Exercise Activity 1: Seated ther ex 15x each LE: ankle pumps, marches, LAQs, hip iso add/abd.      Bed Mobility  Bed Mobility: Yes  Bed Mobility 1  Bed Mobility 1: Sitting to supine  Level of Assistance 1: Minimum assistance  Bed Mobility Comments 1: Min A needed to bring B LEs into bed. Pt able to scoot herself up in bed w/out assist.    Ambulation/Gait Training  Ambulation/Gait Training Performed: Yes  Ambulation/Gait Training 1  Surface 1: Level tile  Device 1: Rolling walker  Assistance 1: Minimum assistance  Quality of Gait 1: Diminished heel strike, Inconsistent stride length, Soft knee(s), Forward flexed posture  Comments/Distance (ft) 1: Pt able to amb 15 ft x2 using RW and min A for safety. Min VCes needed to increase stride length and look up.  Transfers  Transfer: Yes  Transfer 1  Transfer From 1: Sit to  Transfer to 1: Stand  Technique 1: Sit to stand  Transfer Device 1: Walker  Transfer Level of Assistance 1: Moderate verbal cues  Trials/Comments 1: Min VCes needed for correct hand placement.  Transfers 2  Transfer From 2: Stand to  Transfer to 2: Sit  Technique 2: Stand to sit  Transfer Device 2: Walker  Transfer Level of Assistance 2: Minimum assistance    Stairs  Stairs: No    Outcome Measures:  [facility] Basic Mobility  Turning from your back to your side while in a flat bed without using bedrails: A little  Moving from lying on your back to sitting on the side of a flat bed without using bedrails: A little  Moving to and from bed to chair (including a wheelchair): A little  Standing up from a chair using your arms (e.g. wheelchair or bedside chair): A little  To walk in hospital room: A little  Climbing 3-5 steps with railing: A little  Basic Mobility - Total Score: 18    Education Documentation  Handouts, taught by Mariel Rodarte PTA at 7/8/2024 11:37 AM.  Learner: Patient  Readiness: Acceptance  Method: Explanation  Response: Verbalizes  Understanding    Precautions, taught by Mariel Rodarte PTA at 7/8/2024 11:37 AM.  Learner: Patient  Readiness: Acceptance  Method: Explanation  Response: Verbalizes Understanding    Body Mechanics, taught by Mariel Rodarte PTA at 7/8/2024 11:37 AM.  Learner: Patient  Readiness: Acceptance  Method: Explanation  Response: Verbalizes Understanding    Home Exercise Program, taught by Mariel Rodarte PTA at 7/8/2024 11:37 AM.  Learner: Patient  Readiness: Acceptance  Method: Explanation  Response: Verbalizes Understanding    Mobility Training, taught by Mariel Rodarte PTA at 7/8/2024 11:37 AM.  Learner: Patient  Readiness: Acceptance  Method: Explanation  Response: Verbalizes Understanding    Handouts, taught by Mariel Rodarte PTA at 7/8/2024 11:37 AM.  Learner: Patient  Readiness: Acceptance  Method: Explanation  Response: Verbalizes Understanding    Body Mechanics, taught by Mariel Rodarte PTA at 7/8/2024 11:37 AM.  Learner: Patient  Readiness: Acceptance  Method: Explanation  Response: Verbalizes Understanding    Precautions, taught by Mariel Rodarte PTA at 7/8/2024 11:37 AM.  Learner: Patient  Readiness: Acceptance  Method: Explanation  Response: Verbalizes Understanding    Home Exercise Program, taught by Mariel Rodarte PTA at 7/8/2024 11:37 AM.  Learner: Patient  Readiness: Acceptance  Method: Explanation  Response: Verbalizes Understanding    ADL Training, taught by Mariel Rodarte PTA at 7/8/2024 11:37 AM.  Learner: Patient  Readiness: Acceptance  Method: Explanation  Response: Verbalizes Understanding    Education Comments  No comments found.        OP EDUCATION:       Encounter Problems       Encounter Problems (Active)       PT Goals       Patient will transfer bed to chair and chair to bed with supervision assist to facilitate mobility.  (Progressing)       Start:  07/06/24    Expected End:   07/20/24            Patient will transfer supine to sit and sit to supine with supervision assist to facilitate mobility.  (Progressing)       Start:  07/06/24    Expected End:  07/20/24            Patient will amb 100 feet with rolling walker device including two turns on even surface with contact guard assist to facilitate safe mobility.  (Progressing)       Start:  07/06/24    Expected End:  07/20/24            Patient will improve standing dynamic balance to Fair+ for safety during ambulation with rolling walker.  (Progressing)       Start:  07/06/24    Expected End:  07/20/24               Pain - Adult

## 2024-07-09 LAB — BACTERIA UR CULT: ABNORMAL

## 2024-07-09 NOTE — DISCHARGE SUMMARY
Discharge Diagnosis  Fall, initial encounter    Issues Requiring Follow-Up  Rhabdomyolysis traumatic  Anxiety  Hyperlipidemia  UTI    Test Results Pending At Discharge  Pending Labs       Order Current Status    Extra Urine Gray Tube Collected (07/05/24 1606)    Urinalysis with Reflex Culture and Microscopic In process    Urine Culture Preliminary result            Hospital Course   Riya Hernandez is a 83 y.o. female presenting with falls.  She lives with her daughter.  Daughter found her in the hallway on the ground around 4:30 AM.  Few hours later patient had another fall.  Patient has been last week.  Patient herself is a poor historian   Patient was treated for traumatic rhabdomyolysis.  Treated for UTI.  Physical therapy recommended rehab discharge to rehab facility    Pertinent Physical Exam At Time of Discharge  Physical Exam  Vitals reviewed.   Constitutional:       Appearance: Normal appearance.   HENT:      Head: Normocephalic and atraumatic.      Right Ear: Tympanic membrane, ear canal and external ear normal.      Left Ear: Tympanic membrane, ear canal and external ear normal.      Nose: Nose normal.      Mouth/Throat:      Pharynx: Oropharynx is clear.   Eyes:      Extraocular Movements: Extraocular movements intact.      Conjunctiva/sclera: Conjunctivae normal.      Pupils: Pupils are equal, round, and reactive to light.   Cardiovascular:      Rate and Rhythm: Normal rate and regular rhythm.      Pulses: Normal pulses.      Heart sounds: Normal heart sounds.   Pulmonary:      Effort: Pulmonary effort is normal.      Breath sounds: Normal breath sounds.   Abdominal:      General: Abdomen is flat. Bowel sounds are normal.      Palpations: Abdomen is soft.   Musculoskeletal:      Cervical back: Normal range of motion and neck supple.   Skin:     General: Skin is warm and dry.   Neurological:      General: No focal deficit present.      Mental Status: She is alert. She is disoriented.   Psychiatric:          Mood and Affect: Mood normal.         Home Medications     Medication List      START taking these medications     acetaminophen 325 mg tablet; Commonly known as: Tylenol; Take 2 tablets   (650 mg) by mouth every 4 hours if needed for mild pain (1 - 3).   benzocaine-menthol lozenge; Commonly known as: Cepastat Sore Throat;   Dissolve 1 lozenge in the mouth every 2 hours if needed for sore throat.   cephalexin 500 mg capsule; Commonly known as: Keflex; Take 1 capsule   (500 mg) by mouth 3 times a day.   dextromethorphan-guaifenesin  mg/5 mL oral liquid; Commonly known   as: Robitussin DM; Take 5 mL by mouth every 4 hours if needed for cough.   docusate sodium 100 mg capsule; Commonly known as: Colace; Take 1   capsule (100 mg) by mouth 2 times a day.   enoxaparin 40 mg/0.4 mL syringe; Commonly known as: Lovenox; Inject 0.4   mL (40 mg) under the skin once every 24 hours.   guaiFENesin 600 mg 12 hr tablet; Commonly known as: Mucinex; Take 1   tablet (600 mg) by mouth every 12 hours if needed for congestion. Do not   crush, chew, or split.   melatonin 3 mg tablet; Take 1 tablet (3 mg) by mouth as needed at   bedtime for sleep.   ondansetron 4 mg/2 mL injection; Commonly known as: Zofran; Infuse 2 mL   (4 mg) into a venous catheter every 8 hours if needed for nausea.   ondansetron ODT 4 mg disintegrating tablet; Commonly known as:   Zofran-ODT; Take 1 tablet (4 mg) by mouth every 8 hours if needed for   nausea.   pantoprazole 40 mg EC tablet; Commonly known as: ProtoNix; Take 1 tablet   (40 mg) by mouth once daily in the morning. Take before meals. Do not   crush, chew, or split.   polyethylene glycol 17 gram packet; Commonly known as: Glycolax,   Miralax; Take 17 g by mouth once daily.     CONTINUE taking these medications     aspirin 81 mg chewable tablet   atorvastatin 80 mg tablet; Commonly known as: Lipitor   Brilinta 90 mg tablet; Generic drug: ticagrelor   dilTIAZem  mg 24 hr capsule; Commonly  known as: Cardizem CD   donepezil 10 mg tablet; Commonly known as: Aricept   hydrOXYzine pamoate 25 mg capsule; Commonly known as: Vistaril; Take 1   capsule (25 mg) by mouth every 8 hours if needed for itching.   losartan 25 mg tablet; Commonly known as: Cozaar   memantine 10 mg tablet; Commonly known as: Namenda   metoprolol tartrate 25 mg tablet; Commonly known as: Lopressor       Outpatient Follow-Up  Future Appointments   Date Time Provider Department Center   10/23/2024  9:15 AM Isidoro Bright MD [id number] [facility]       Trini Regan MD

## 2024-07-09 NOTE — PROGRESS NOTES
Nic Hernandez is a 83 y.o. female on day 3 of admission presenting with Fall, initial encounter.      Subjective   Remains pleasantly confused       Objective     Last Recorded Vitals  /70   Pulse 73   Temp 36.6 °C (97.9 °F) (Temporal)   Resp 15   Wt 64.1 kg (141 lb 6.4 oz)   SpO2 98%   Intake/Output last 3 Shifts:  No intake or output data in the 24 hours ending 07/09/24 1044      Admission Weight  Weight: 64.1 kg (141 lb 6.4 oz) (07/05/24 1112)    Daily Weight  07/05/24 : 64.1 kg (141 lb 6.4 oz)    Image Results  Transthoracic Echo (TTE) Complete              Formerly named [facility]  [address], [place] [id number]              Phone [phone]    TRANSTHORACIC ECHOCARDIOGRAM REPORT    Patient Name:     NIC HERNANDEZ   Reading Physician:  [id number] Alber Blue MD  Study Date:       7/8/2024             Ordering Provider:  [id number] GENARO OCAMPO  MRN/PID:          [id number]             Fellow:  Accession#:       [id number]         Nurse:  Date of           1940 / 83 years Sonographer:        Kayla Smith RD  Birth/Age:  Gender:           F                    Additional Staff:  Height:           165.10 cm            Admit Date:  Weight:           63.96 kg             Admission Status:   Inpatient - Routine  BSA / BMI:        1.71 m2 / 23.46      Department          [facility]                    kg/m2                Location:  Blood Pressure: 142 /95 mmHg    Study Type:    TRANSTHORACIC ECHO (TTE) COMPLETE  Diagnosis/ICD: Syncope-R55  Indication:    FALL  CPT Codes:     Echo Complete w Full [id number]    Patient History:  Pertinent History: Chest Pain, CAD, Hyperlipidemia, HTN and FALL.    Study Detail: The following Echo studies were performed: 2D, M-Mode, Doppler and                color flow.       PHYSICIAN INTERPRETATION:  Left Ventricle: The left ventricular systolic function is normal, with a visually  estimated ejection fraction of 60-65%. There are no regional wall motion abnormalities. The left ventricular cavity size is normal. Spectral Doppler shows an impaired relaxation pattern of left ventricular diastolic filling.  Left Atrium: The left atrium is normal in size.  Right Ventricle: The right ventricle is normal in size. There is normal right ventriclar wall thickness. There is normal right ventricular global systolic function.  Right Atrium: The right atrium is normal in size.  Aortic Valve: The aortic valve appears structurally normal. The aortic valve appears tricuspid and non-restricted. There is no evidence of aortic valve regurgitation. The peak instantaneous gradient of the aortic valve is 8.2 mmHg.  Mitral Valve: The mitral valve is normal in structure. There is normal mitral valve leaflet mobility. There is no evidence of mitral valve regurgitation.  Tricuspid Valve: The tricuspid valve is structurally normal. There is normal tricuspid valve leaflet mobility. There is mild tricuspid regurgitation. The Doppler estimated RVSP is within normal limits at 30.7 mmHg.  Pulmonic Valve: The pulmonic valve is structurally normal. There is trace pulmonic valve regurgitation.  Pericardium: There is no pericardial effusion noted. There is a pericardial fat pad present.  Aorta: The aortic root is normal. There is no dilatation of the aortic arch. There is no dilatation of the ascending aorta. There is no dilatation of the aortic root.  Pulmonary Artery: The pulmonary artery is normal in size. The tricuspid regurgitant velocity is 2.63 m/s, and with an estimated right atrial pressure of 3 mmHg, the estimated pulmonary artery pressure is normal with the RVSP at 30.7 mmHg. The estimated PASP is 31 mmHg.  Systemic Veins: The inferior vena cava appears to be of normal size. There is IVC inspiratory collapse greater than 50%.       CONCLUSIONS:   1. The left ventricular systolic function is normal, with a visually  estimated ejection fraction of 60-65%.   2. Spectral Doppler shows an impaired relaxation pattern of left ventricular diastolic filling.   3. There is normal right ventricular global systolic function.   4. Mild tricuspid regurgitation is visualized.   5. RVSP within normal limits.   6. The estimated PASP is 31 mmHg.    QUANTITATIVE DATA SUMMARY:  2D MEASUREMENTS:                           Normal Ranges:  LAs:           2.60 cm   (2.7-4.0cm)  IVSd:          0.70 cm   (0.6-1.1cm)  LVPWd:         0.76 cm   (0.6-1.1cm)  LVIDd:         4.17 cm   (3.9-5.9cm)  LVIDs:         3.07 cm  LV Mass Index: 52.0 g/m2  LV % FS        26.4 %    LA VOLUME:                                Normal Ranges:  LA Vol A4C:        32.8 ml    (22+/-6mL/m2)  LA Vol A2C:        24.2 ml  LA Vol BP:         28.8 ml  LA Vol Index A4C:  19.2ml/m2  LA Vol Index A2C:  14.2 ml/m2  LA Vol Index BP:   16.9 ml/m2  LA Area A4C:       13.9 cm2  LA Area A2C:       11.7 cm2  LA Major Axis A4C: 5.0 cm  LA Major Axis A2C: 4.8 cm  LA Volume Index:   15.8 ml/m2  LA Vol A4C:        29.8 ml  LA Vol A2C:        23.8 ml    RA VOLUME BY A/L METHOD:                        Normal Ranges:  RA Area A4C: 14.2 cm2    M-MODE MEASUREMENTS:                   Normal Ranges:  Ao Root: 2.10 cm (2.0-3.7cm)    AORTA MEASUREMENTS:                     Normal Ranges:  Asc Ao, d: 2.30 cm (2.1-3.4cm)    LV SYSTOLIC FUNCTION BY 2D PLANIMETRY (MOD):                       Normal Ranges:  EF-A4C View:    54 % (>=55%)  EF-Visual:      63 %  LV EF Reported: 63 %    LV DIASTOLIC FUNCTION:                             Normal Ranges:  MV Peak E:      0.57 m/s   (0.7-1.2 m/s)  MV Peak A:      0.80 m/s   (0.42-0.7 m/s)  E/A Ratio:      0.71       (1.0-2.2)  MV e'           0.059 m/s  (>8.0)  MV lateral e'   0.08 m/s  MV medial e'    0.04 m/s  E/e' Ratio:     9.67       (<8.0)  PulmV Sys Mg:  70.30 cm/s  PulmV To Mg: 28.60 cm/s  PulmV S/D Mg:  2.50    MITRAL VALVE:                  Normal  Ranges:  MV DT: 347 msec (150-240msec)    AORTIC VALVE:                          Normal Ranges:  AoV Vmax:      1.43 m/s (<=1.7m/s)  AoV Peak P.2 mmHg (<20mmHg)  LVOT Max Mg:  1.00 m/s (<=1.1m/s)  LVOT Diameter: 1.80 cm  (1.8-2.4cm)  AoV Area,Vmax: 1.77 cm2 (2.5-4.5cm2)       RIGHT VENTRICLE:  RV Basal 3.28 cm  RV Mid   2.10 cm  RV Major 8.0 cm  TAPSE:   21.9 mm  RV s'    0.10 m/s    TRICUSPID VALVE/RVSP:                              Normal Ranges:  Peak TR Velocity: 2.63 m/s  RV Syst Pressure: 30.7 mmHg (< 30mmHg)  IVC Diam:         1.19 cm    Pulmonary Veins:  PulmV To Mg: 28.60 cm/s  PulmV S/D Mg:  2.50  PulmV Sys Mg:  70.30 cm/s       [id number] Alber Blue MD  Electronically signed on 2024 at 9:20:20 AM       ** Final **      Physical Exam  Vitals reviewed.   Constitutional:       Appearance: Normal appearance.   HENT:      Head: Normocephalic and atraumatic.      Right Ear: Tympanic membrane, ear canal and external ear normal.      Left Ear: Tympanic membrane, ear canal and external ear normal.      Nose: Nose normal.      Mouth/Throat:      Pharynx: Oropharynx is clear.   Eyes:      Extraocular Movements: Extraocular movements intact.      Conjunctiva/sclera: Conjunctivae normal.      Pupils: Pupils are equal, round, and reactive to light.   Cardiovascular:      Rate and Rhythm: Normal rate and regular rhythm.      Pulses: Normal pulses.      Heart sounds: Normal heart sounds.   Pulmonary:      Effort: Pulmonary effort is normal.      Breath sounds: Normal breath sounds.   Abdominal:      General: Abdomen is flat. Bowel sounds are normal.      Palpations: Abdomen is soft.   Musculoskeletal:      Cervical back: Normal range of motion and neck supple.   Skin:     General: Skin is warm and dry.   Neurological:      General: No focal deficit present.      Mental Status: She is alert. She is disoriented.   Psychiatric:         Mood and Affect: Mood normal.         Relevant Results              Scheduled medications      Continuous medications      PRN medications    No results found for this or any previous visit (from the past 24 hour(s)).    Transthoracic Echo (TTE) Complete    Result Date: 7/8/2024            [facility] [address] , [place] [id number]             Phone [phone] TRANSTHORACIC ECHOCARDIOGRAM REPORT Patient Name:     NIC CHÁVEZ   Reading Physician:  [id number] Alber Blue MD Study Date:       7/8/2024             Ordering Provider:  [id number] GENARO OCAMPO MRN/PID:          [id number]             Fellow: Accession#:       [id number]         Nurse: Date of           1940 / 83 years Sonographer:        Kayla Smith RDCS Birth/Age: Gender:           F                    Additional Staff: Height:           165.10 cm            Admit Date: Weight:           63.96 kg             Admission Status:   Inpatient - Routine BSA / BMI:        1.71 m2 / 23.46      Department          [facility]                   kg/m2                Location: Blood Pressure: 142 /95 mmHg Study Type:    TRANSTHORACIC ECHO (TTE) COMPLETE Diagnosis/ICD: Syncope-R55 Indication:    FALL CPT Codes:     Echo Complete w Full [id number] Patient History: Pertinent History: Chest Pain, CAD, Hyperlipidemia, HTN and FALL. Study Detail: The following Echo studies were performed: 2D, M-Mode, Doppler and               color flow.  PHYSICIAN INTERPRETATION: Left Ventricle: The left ventricular systolic function is normal, with a visually estimated ejection fraction of 60-65%. There are no regional wall motion abnormalities. The left ventricular cavity size is normal. Spectral Doppler shows an impaired relaxation pattern of left ventricular diastolic filling. Left Atrium: The left atrium is normal in size. Right Ventricle: The right ventricle is normal in size. There is normal right ventriclar wall thickness. There is normal right  ventricular global systolic function. Right Atrium: The right atrium is normal in size. Aortic Valve: The aortic valve appears structurally normal. The aortic valve appears tricuspid and non-restricted. There is no evidence of aortic valve regurgitation. The peak instantaneous gradient of the aortic valve is 8.2 mmHg. Mitral Valve: The mitral valve is normal in structure. There is normal mitral valve leaflet mobility. There is no evidence of mitral valve regurgitation. Tricuspid Valve: The tricuspid valve is structurally normal. There is normal tricuspid valve leaflet mobility. There is mild tricuspid regurgitation. The Doppler estimated RVSP is within normal limits at 30.7 mmHg. Pulmonic Valve: The pulmonic valve is structurally normal. There is trace pulmonic valve regurgitation. Pericardium: There is no pericardial effusion noted. There is a pericardial fat pad present. Aorta: The aortic root is normal. There is no dilatation of the aortic arch. There is no dilatation of the ascending aorta. There is no dilatation of the aortic root. Pulmonary Artery: The pulmonary artery is normal in size. The tricuspid regurgitant velocity is 2.63 m/s, and with an estimated right atrial pressure of 3 mmHg, the estimated pulmonary artery pressure is normal with the RVSP at 30.7 mmHg. The estimated PASP is 31 mmHg. Systemic Veins: The inferior vena cava appears to be of normal size. There is IVC inspiratory collapse greater than 50%.  CONCLUSIONS:  1. The left ventricular systolic function is normal, with a visually estimated ejection fraction of 60-65%.  2. Spectral Doppler shows an impaired relaxation pattern of left ventricular diastolic filling.  3. There is normal right ventricular global systolic function.  4. Mild tricuspid regurgitation is visualized.  5. RVSP within normal limits.  6. The estimated PASP is 31 mmHg. QUANTITATIVE DATA SUMMARY: 2D MEASUREMENTS:                          Normal Ranges: LAs:           2.60 cm    (2.7-4.0cm) IVSd:          0.70 cm   (0.6-1.1cm) LVPWd:         0.76 cm   (0.6-1.1cm) LVIDd:         4.17 cm   (3.9-5.9cm) LVIDs:         3.07 cm LV Mass Index: 52.0 g/m2 LV % FS        26.4 % LA VOLUME:                               Normal Ranges: LA Vol A4C:        32.8 ml    (22+/-6mL/m2) LA Vol A2C:        24.2 ml LA Vol BP:         28.8 ml LA Vol Index A4C:  19.2ml/m2 LA Vol Index A2C:  14.2 ml/m2 LA Vol Index BP:   16.9 ml/m2 LA Area A4C:       13.9 cm2 LA Area A2C:       11.7 cm2 LA Major Axis A4C: 5.0 cm LA Major Axis A2C: 4.8 cm LA Volume Index:   15.8 ml/m2 LA Vol A4C:        29.8 ml LA Vol A2C:        23.8 ml RA VOLUME BY A/L METHOD:                       Normal Ranges: RA Area A4C: 14.2 cm2 M-MODE MEASUREMENTS:                  Normal Ranges: Ao Root: 2.10 cm (2.0-3.7cm) AORTA MEASUREMENTS:                    Normal Ranges: Asc Ao, d: 2.30 cm (2.1-3.4cm) LV SYSTOLIC FUNCTION BY 2D PLANIMETRY (MOD):                      Normal Ranges: EF-A4C View:    54 % (>=55%) EF-Visual:      63 % LV EF Reported: 63 % LV DIASTOLIC FUNCTION:                            Normal Ranges: MV Peak E:      0.57 m/s   (0.7-1.2 m/s) MV Peak A:      0.80 m/s   (0.42-0.7 m/s) E/A Ratio:      0.71       (1.0-2.2) MV e'           0.059 m/s  (>8.0) MV lateral e'   0.08 m/s MV medial e'    0.04 m/s E/e' Ratio:     9.67       (<8.0) PulmV Sys Mg:  70.30 cm/s PulmV To Mg: 28.60 cm/s PulmV S/D Mg:  2.50 MITRAL VALVE:                 Normal Ranges: MV DT: 347 msec (150-240msec) AORTIC VALVE:                         Normal Ranges: AoV Vmax:      1.43 m/s (<=1.7m/s) AoV Peak P.2 mmHg (<20mmHg) LVOT Max Mg:  1.00 m/s (<=1.1m/s) LVOT Diameter: 1.80 cm  (1.8-2.4cm) AoV Area,Vmax: 1.77 cm2 (2.5-4.5cm2)  RIGHT VENTRICLE: RV Basal 3.28 cm RV Mid   2.10 cm RV Major 8.0 cm TAPSE:   21.9 mm RV s'    0.10 m/s TRICUSPID VALVE/RVSP:                             Normal Ranges: Peak TR Velocity: 2.63 m/s RV Syst Pressure: 30.7 mmHg (< 30mmHg)  IVC Diam:         1.19 cm Pulmonary Veins: PulmV To Mg: 28.60 cm/s PulmV S/D Mg:  2.50 PulmV Sys Mg:  70.30 cm/s  [id number] Alber Blue MD Electronically signed on 7/8/2024 at 9:20:20 AM  ** Final **      Assessment/Plan                  Principal Problem:    Fall, initial encounter  Active Problems:    Anxiety    Coronary artery disease    Hyperlipidemia    Traumatic rhabdomyolysis (CMS-HCC)    UTI (urinary tract infection)    Confusion    Stenosis of left carotid artery      Continue IV antibiotics  Blood pressure stable  Vascular consult for carotid stenosis  COPD stable  DC to rehab  Discharge forms filled  [facility] stef Regan MD

## 2024-07-12 ENCOUNTER — NURSING HOME VISIT (OUTPATIENT)
Dept: POST ACUTE CARE | Facility: EXTERNAL LOCATION | Age: 84
End: 2024-07-12
Payer: MEDICARE

## 2024-07-12 DIAGNOSIS — F03.90 DEMENTIA, UNSPECIFIED DEMENTIA SEVERITY, UNSPECIFIED DEMENTIA TYPE, UNSPECIFIED WHETHER BEHAVIORAL, PSYCHOTIC, OR MOOD DISTURBANCE OR ANXIETY (MULTI): ICD-10-CM

## 2024-07-12 DIAGNOSIS — M19.90 OSTEOARTHRITIS, UNSPECIFIED OSTEOARTHRITIS TYPE, UNSPECIFIED SITE: ICD-10-CM

## 2024-07-12 DIAGNOSIS — E78.5 HYPERLIPIDEMIA, UNSPECIFIED HYPERLIPIDEMIA TYPE: ICD-10-CM

## 2024-07-12 DIAGNOSIS — I25.10 CORONARY ARTERY DISEASE, UNSPECIFIED VESSEL OR LESION TYPE, UNSPECIFIED WHETHER ANGINA PRESENT, UNSPECIFIED WHETHER NATIVE OR TRANSPLANTED HEART: ICD-10-CM

## 2024-07-12 DIAGNOSIS — Z91.81 AT RISK FOR FALLING: ICD-10-CM

## 2024-07-12 DIAGNOSIS — I10 HYPERTENSION, UNSPECIFIED TYPE: ICD-10-CM

## 2024-07-12 DIAGNOSIS — R53.1 WEAKNESS: ICD-10-CM

## 2024-07-12 DIAGNOSIS — M62.82 NON-TRAUMATIC RHABDOMYOLYSIS: ICD-10-CM

## 2024-07-12 DIAGNOSIS — N39.0 URINARY TRACT INFECTION WITHOUT HEMATURIA, SITE UNSPECIFIED: ICD-10-CM

## 2024-07-12 DIAGNOSIS — J44.9 CHRONIC OBSTRUCTIVE PULMONARY DISEASE, UNSPECIFIED COPD TYPE (MULTI): Primary | ICD-10-CM

## 2024-07-12 PROCEDURE — 99309 SBSQ NF CARE MODERATE MDM 30: CPT | Performed by: INTERNAL MEDICINE

## 2024-07-12 NOTE — LETTER
Patient: Riya Hernandez  : 1940    Encounter Date: 2024    PLACE OF SERVICE:  [facility]    This is a subsequent visit.    Subjective  Patient ID: Riya Hernandez is a 83 y.o. female who presents for Follow-up.    Ms. Riya Hernandez is an 83-year-old female with history of COPD with recent fall.  She is unable to care for herself and requires supportive care.    Review of Systems   Constitutional:  Negative for chills and fever.   Cardiovascular:  Negative for chest pain.   All other systems reviewed and are negative.    Objective  /82   Pulse 78   Temp 36.7 °C (98.1 °F)   Resp 18     Physical Exam  Vitals reviewed.   Constitutional:       General: She is not in acute distress.     Comments: This is a well-developed and well-nourished female, sitting in a chair   HENT:      Right Ear: Tympanic membrane, ear canal and external ear normal.      Left Ear: Tympanic membrane, ear canal and external ear normal.   Eyes:      General: No scleral icterus.     Pupils: Pupils are equal, round, and reactive to light.   Neck:      Vascular: No carotid bruit.   Cardiovascular:      Heart sounds: Normal heart sounds, S1 normal and S2 normal. No murmur heard.     No friction rub.   Pulmonary:      Breath sounds: Decreased breath sounds (throughout) present.   Abdominal:      Palpations: There is no hepatomegaly, splenomegaly or mass.   Musculoskeletal:         General: No swelling or deformity. Normal range of motion.      Cervical back: Neck supple.      Right lower leg: No edema.      Left lower leg: No edema.   Lymphadenopathy:      Cervical: No cervical adenopathy.      Upper Body:      Right upper body: No axillary adenopathy.      Left upper body: No axillary adenopathy.      Lower Body: No right inguinal adenopathy. No left inguinal adenopathy.   Neurological:      Mental Status: She is alert.      Cranial Nerves: Cranial nerves 2-12 are intact. No cranial nerve deficit.       Sensory: No sensory deficit.      Motor: Motor function is intact. No weakness.      Gait: Gait is intact.      Deep Tendon Reflexes: Reflexes normal.      Comments: The patient is oriented x2.   Psychiatric:         Mood and Affect: Mood normal. Mood is not anxious or depressed. Affect is not angry.         Behavior: Behavior is not agitated.         Thought Content: Thought content normal.         Judgment: Judgment normal.     LAB WORK: Laboratory studies were reviewed.    Assessment/Plan  Problem List Items Addressed This Visit             ICD-10-CM       Cardiac and Vasculature    Coronary artery disease I25.10    Hypertensive disorder I10    Hyperlipidemia E78.5       Genitourinary and Reproductive    UTI (urinary tract infection) N39.0       Neuro    Dementia (Multi) F03.90     Other Visit Diagnoses         Codes    Chronic obstructive pulmonary disease, unspecified COPD type (Multi)    -  Primary J44.9    Non-traumatic rhabdomyolysis     M62.82    Osteoarthritis, unspecified osteoarthritis type, unspecified site     M19.90    Weakness     R53.1    At risk for falling     Z91.81        1. COPD, on bronchodilator therapy.  2. Dementia, unchanged.  3. Rhabdomyolysis, monitor CK.  4. Urinary tract infection, finish antibiotic.  5. Hyperlipidemia, on statin.  6. Coronary artery disease, on aspirin.  7. Osteoarthritis, on Tylenol.  8. Hypertension, medically controlled.  9. Weakness, on PT/OT.  10. Fall risk, fall precaution.    Scribe Attestation  By signing my name below, IEleanor Scribe attest that this documentation has been prepared under the direction and in the presence of Hermann Regan MD.     All medical record entries made by the scribe were personally dictated by me I have reviewed the chart and agree the record accurately reflects my personal performance of his history physical examination and management        Electronically Signed By: Hermann Regan MD   7/16/24 11:03 PM

## 2024-07-15 VITALS
RESPIRATION RATE: 18 BRPM | SYSTOLIC BLOOD PRESSURE: 126 MMHG | HEART RATE: 78 BPM | DIASTOLIC BLOOD PRESSURE: 82 MMHG | TEMPERATURE: 98.1 F

## 2024-07-15 ASSESSMENT — ENCOUNTER SYMPTOMS
FEVER: 0
CHILLS: 0

## 2024-07-15 NOTE — PROGRESS NOTES
PLACE OF SERVICE:  [facility]    This is a subsequent visit.    Subjective   Patient ID: Riya Hernandez is a 83 y.o. female who presents for Follow-up.    Ms. Riya Hernandez is an 83-year-old female with history of COPD with recent fall.  She is unable to care for herself and requires supportive care.    Review of Systems   Constitutional:  Negative for chills and fever.   Cardiovascular:  Negative for chest pain.   All other systems reviewed and are negative.    Objective   /82   Pulse 78   Temp 36.7 °C (98.1 °F)   Resp 18     Physical Exam  Vitals reviewed.   Constitutional:       General: She is not in acute distress.     Comments: This is a well-developed and well-nourished female, sitting in a chair   HENT:      Right Ear: Tympanic membrane, ear canal and external ear normal.      Left Ear: Tympanic membrane, ear canal and external ear normal.   Eyes:      General: No scleral icterus.     Pupils: Pupils are equal, round, and reactive to light.   Neck:      Vascular: No carotid bruit.   Cardiovascular:      Heart sounds: Normal heart sounds, S1 normal and S2 normal. No murmur heard.     No friction rub.   Pulmonary:      Breath sounds: Decreased breath sounds (throughout) present.   Abdominal:      Palpations: There is no hepatomegaly, splenomegaly or mass.   Musculoskeletal:         General: No swelling or deformity. Normal range of motion.      Cervical back: Neck supple.      Right lower leg: No edema.      Left lower leg: No edema.   Lymphadenopathy:      Cervical: No cervical adenopathy.      Upper Body:      Right upper body: No axillary adenopathy.      Left upper body: No axillary adenopathy.      Lower Body: No right inguinal adenopathy. No left inguinal adenopathy.   Neurological:      Mental Status: She is alert.      Cranial Nerves: Cranial nerves 2-12 are intact. No cranial nerve deficit.      Sensory: No sensory deficit.      Motor: Motor function is intact. No  weakness.      Gait: Gait is intact.      Deep Tendon Reflexes: Reflexes normal.      Comments: The patient is oriented x2.   Psychiatric:         Mood and Affect: Mood normal. Mood is not anxious or depressed. Affect is not angry.         Behavior: Behavior is not agitated.         Thought Content: Thought content normal.         Judgment: Judgment normal.     LAB WORK: Laboratory studies were reviewed.    Assessment/Plan   Problem List Items Addressed This Visit             ICD-10-CM       Cardiac and Vasculature    Coronary artery disease I25.10    Hypertensive disorder I10    Hyperlipidemia E78.5       Genitourinary and Reproductive    UTI (urinary tract infection) N39.0       Neuro    Dementia (Multi) F03.90     Other Visit Diagnoses         Codes    Chronic obstructive pulmonary disease, unspecified COPD type (Multi)    -  Primary J44.9    Non-traumatic rhabdomyolysis     M62.82    Osteoarthritis, unspecified osteoarthritis type, unspecified site     M19.90    Weakness     R53.1    At risk for falling     Z91.81        1. COPD, on bronchodilator therapy.  2. Dementia, unchanged.  3. Rhabdomyolysis, monitor CK.  4. Urinary tract infection, finish antibiotic.  5. Hyperlipidemia, on statin.  6. Coronary artery disease, on aspirin.  7. Osteoarthritis, on Tylenol.  8. Hypertension, medically controlled.  9. Weakness, on PT/OT.  10. Fall risk, fall precaution.    Scribe Attestation  By signing my name below, IEleanor Scribe attest that this documentation has been prepared under the direction and in the presence of Hermann Regan MD.     All medical record entries made by the scribe were personally dictated by me I have reviewed the chart and agree the record accurately reflects my personal performance of his history physical examination and management

## 2024-07-20 ENCOUNTER — NURSING HOME VISIT (OUTPATIENT)
Dept: POST ACUTE CARE | Facility: EXTERNAL LOCATION | Age: 84
End: 2024-07-20
Payer: MEDICARE

## 2024-07-20 DIAGNOSIS — J44.9 CHRONIC OBSTRUCTIVE PULMONARY DISEASE, UNSPECIFIED COPD TYPE (MULTI): Primary | ICD-10-CM

## 2024-07-20 DIAGNOSIS — F03.90 DEMENTIA, UNSPECIFIED DEMENTIA SEVERITY, UNSPECIFIED DEMENTIA TYPE, UNSPECIFIED WHETHER BEHAVIORAL, PSYCHOTIC, OR MOOD DISTURBANCE OR ANXIETY (MULTI): ICD-10-CM

## 2024-07-20 DIAGNOSIS — I25.10 CORONARY ARTERY DISEASE, UNSPECIFIED VESSEL OR LESION TYPE, UNSPECIFIED WHETHER ANGINA PRESENT, UNSPECIFIED WHETHER NATIVE OR TRANSPLANTED HEART: ICD-10-CM

## 2024-07-20 DIAGNOSIS — N39.0 URINARY TRACT INFECTION WITHOUT HEMATURIA, SITE UNSPECIFIED: ICD-10-CM

## 2024-07-20 DIAGNOSIS — M19.90 OSTEOARTHRITIS, UNSPECIFIED OSTEOARTHRITIS TYPE, UNSPECIFIED SITE: ICD-10-CM

## 2024-07-20 DIAGNOSIS — E78.5 HYPERLIPIDEMIA, UNSPECIFIED HYPERLIPIDEMIA TYPE: ICD-10-CM

## 2024-07-20 DIAGNOSIS — Z91.81 AT RISK FOR FALLING: ICD-10-CM

## 2024-07-20 DIAGNOSIS — I10 HYPERTENSION, UNSPECIFIED TYPE: ICD-10-CM

## 2024-07-20 DIAGNOSIS — M62.82 NON-TRAUMATIC RHABDOMYOLYSIS: ICD-10-CM

## 2024-07-20 DIAGNOSIS — R53.1 WEAKNESS: ICD-10-CM

## 2024-07-20 PROCEDURE — 99309 SBSQ NF CARE MODERATE MDM 30: CPT | Performed by: INTERNAL MEDICINE

## 2024-07-20 NOTE — LETTER
Patient: Riya Hernandez  : 1940    Encounter Date: 2024    PLACE OF SERVICE:  [facility].    This is a subsequent visit.    Subjective  Patient ID: Riya Hernandez is a 83 y.o. female who presents for Follow-up.    Ms. Riya Hernandez is an 83-year-old female with history of dementia who suffers from COPD.  She is unable to care for herself and requires supportive care.    Review of Systems   Constitutional:  Negative for chills and fever.   Cardiovascular:  Negative for chest pain.   All other systems reviewed and are negative.    Objective  /78   Pulse 76   Temp 36.6 °C (97.9 °F)   Resp 18     Physical Exam  Vitals reviewed.   Constitutional:       General: She is not in acute distress.     Comments: This is a well-developed and well-nourished female, sitting in a chair.   HENT:      Right Ear: Tympanic membrane, ear canal and external ear normal.      Left Ear: Tympanic membrane, ear canal and external ear normal.   Eyes:      General: No scleral icterus.     Pupils: Pupils are equal, round, and reactive to light.   Neck:      Vascular: No carotid bruit.   Cardiovascular:      Heart sounds: Normal heart sounds, S1 normal and S2 normal. No murmur heard.     No friction rub.   Pulmonary:      Effort: Pulmonary effort is normal.      Breath sounds: Decreased breath sounds (throughout) present.   Abdominal:      Palpations: There is no hepatomegaly, splenomegaly or mass.   Musculoskeletal:         General: No swelling or deformity. Normal range of motion.      Cervical back: Neck supple.      Right lower leg: No edema.      Left lower leg: No edema.   Lymphadenopathy:      Cervical: No cervical adenopathy.      Upper Body:      Right upper body: No axillary adenopathy.      Left upper body: No axillary adenopathy.      Lower Body: No right inguinal adenopathy. No left inguinal adenopathy.   Neurological:      Mental Status: She is alert.      Cranial Nerves: Cranial  nerves 2-12 are intact. No cranial nerve deficit.      Sensory: No sensory deficit.      Motor: Motor function is intact. No weakness.      Gait: Gait is intact.      Deep Tendon Reflexes: Reflexes normal.      Comments: The patient is oriented x2.   Psychiatric:         Mood and Affect: Mood normal. Mood is not anxious or depressed. Affect is not angry.         Behavior: Behavior is not agitated.         Thought Content: Thought content normal.         Judgment: Judgment normal.     LAB WORK:  Laboratory studies reviewed.    Assessment/Plan  Problem List Items Addressed This Visit             ICD-10-CM       Cardiac and Vasculature    Coronary artery disease I25.10    Hypertensive disorder I10    Hyperlipidemia E78.5       Genitourinary and Reproductive    UTI (urinary tract infection) N39.0       Neuro    Dementia (Multi) F03.90     Other Visit Diagnoses         Codes    Chronic obstructive pulmonary disease, unspecified COPD type (Multi)    -  Primary J44.9    Non-traumatic rhabdomyolysis     M62.82    Osteoarthritis, unspecified osteoarthritis type, unspecified site     M19.90    Weakness     R53.1    At risk for falling     Z91.81        1. COPD, on bronchodilator therapy.  2. Hypertension, med controlled.  3. Recent rhabdomyolysis, continue to monitor.  4. Dementia, unchanged.  5. Recent UTI, continue to monitor.  6. Coronary artery disease, on aspirin.  7. Hyperlipidemia, on statin.  8. Osteoarthritis, on Tylenol.  9. Weakness, on PT/OT.  10. Fall risk, fall precaution.    Scribe Attestation  By signing my name below, IIsamar Scribe attest that this documentation has been prepared under the direction and in the presence of Hermann Regan MD.     All medical record entries made by the scribe were personally dictated by me I have reviewed the chart and agree the record accurately reflects my personal performance of his history physical examination and management      Electronically Signed By: Hermann  MD Jass   7/28/24 12:20 AM

## 2024-07-23 VITALS
SYSTOLIC BLOOD PRESSURE: 130 MMHG | DIASTOLIC BLOOD PRESSURE: 78 MMHG | HEART RATE: 76 BPM | RESPIRATION RATE: 18 BRPM | TEMPERATURE: 97.9 F

## 2024-07-23 ASSESSMENT — ENCOUNTER SYMPTOMS
CHILLS: 0
FEVER: 0

## 2024-07-23 NOTE — PROGRESS NOTES
PLACE OF SERVICE:  [facility].    This is a subsequent visit.    Subjective   Patient ID: Riya Hernandez is a 83 y.o. female who presents for Follow-up.    Ms. Riya Hernandez is an 83-year-old female with history of dementia who suffers from COPD.  She is unable to care for herself and requires supportive care.    Review of Systems   Constitutional:  Negative for chills and fever.   Cardiovascular:  Negative for chest pain.   All other systems reviewed and are negative.    Objective   /78   Pulse 76   Temp 36.6 °C (97.9 °F)   Resp 18     Physical Exam  Vitals reviewed.   Constitutional:       General: She is not in acute distress.     Comments: This is a well-developed and well-nourished female, sitting in a chair.   HENT:      Right Ear: Tympanic membrane, ear canal and external ear normal.      Left Ear: Tympanic membrane, ear canal and external ear normal.   Eyes:      General: No scleral icterus.     Pupils: Pupils are equal, round, and reactive to light.   Neck:      Vascular: No carotid bruit.   Cardiovascular:      Heart sounds: Normal heart sounds, S1 normal and S2 normal. No murmur heard.     No friction rub.   Pulmonary:      Effort: Pulmonary effort is normal.      Breath sounds: Decreased breath sounds (throughout) present.   Abdominal:      Palpations: There is no hepatomegaly, splenomegaly or mass.   Musculoskeletal:         General: No swelling or deformity. Normal range of motion.      Cervical back: Neck supple.      Right lower leg: No edema.      Left lower leg: No edema.   Lymphadenopathy:      Cervical: No cervical adenopathy.      Upper Body:      Right upper body: No axillary adenopathy.      Left upper body: No axillary adenopathy.      Lower Body: No right inguinal adenopathy. No left inguinal adenopathy.   Neurological:      Mental Status: She is alert.      Cranial Nerves: Cranial nerves 2-12 are intact. No cranial nerve deficit.      Sensory: No sensory  deficit.      Motor: Motor function is intact. No weakness.      Gait: Gait is intact.      Deep Tendon Reflexes: Reflexes normal.      Comments: The patient is oriented x2.   Psychiatric:         Mood and Affect: Mood normal. Mood is not anxious or depressed. Affect is not angry.         Behavior: Behavior is not agitated.         Thought Content: Thought content normal.         Judgment: Judgment normal.     LAB WORK:  Laboratory studies reviewed.    Assessment/Plan   Problem List Items Addressed This Visit             ICD-10-CM       Cardiac and Vasculature    Coronary artery disease I25.10    Hypertensive disorder I10    Hyperlipidemia E78.5       Genitourinary and Reproductive    UTI (urinary tract infection) N39.0       Neuro    Dementia (Multi) F03.90     Other Visit Diagnoses         Codes    Chronic obstructive pulmonary disease, unspecified COPD type (Multi)    -  Primary J44.9    Non-traumatic rhabdomyolysis     M62.82    Osteoarthritis, unspecified osteoarthritis type, unspecified site     M19.90    Weakness     R53.1    At risk for falling     Z91.81        1. COPD, on bronchodilator therapy.  2. Hypertension, med controlled.  3. Recent rhabdomyolysis, continue to monitor.  4. Dementia, unchanged.  5. Recent UTI, continue to monitor.  6. Coronary artery disease, on aspirin.  7. Hyperlipidemia, on statin.  8. Osteoarthritis, on Tylenol.  9. Weakness, on PT/OT.  10. Fall risk, fall precaution.    Scribe Attestation  By signing my name below, IIsamar Scribe attest that this documentation has been prepared under the direction and in the presence of Hermann Regan MD.     All medical record entries made by the scribe were personally dictated by me I have reviewed the chart and agree the record accurately reflects my personal performance of his history physical examination and management

## 2024-07-27 ENCOUNTER — NURSING HOME VISIT (OUTPATIENT)
Dept: POST ACUTE CARE | Facility: EXTERNAL LOCATION | Age: 84
End: 2024-07-27
Payer: MEDICARE

## 2024-07-27 DIAGNOSIS — J44.9 CHRONIC OBSTRUCTIVE PULMONARY DISEASE, UNSPECIFIED COPD TYPE (MULTI): ICD-10-CM

## 2024-07-27 DIAGNOSIS — E78.5 HYPERLIPIDEMIA, UNSPECIFIED HYPERLIPIDEMIA TYPE: ICD-10-CM

## 2024-07-27 DIAGNOSIS — I25.10 CORONARY ARTERY DISEASE, UNSPECIFIED VESSEL OR LESION TYPE, UNSPECIFIED WHETHER ANGINA PRESENT, UNSPECIFIED WHETHER NATIVE OR TRANSPLANTED HEART: ICD-10-CM

## 2024-07-27 DIAGNOSIS — I10 HYPERTENSION, UNSPECIFIED TYPE: ICD-10-CM

## 2024-07-27 DIAGNOSIS — R53.1 WEAKNESS: ICD-10-CM

## 2024-07-27 DIAGNOSIS — Z91.81 AT RISK FOR FALLING: ICD-10-CM

## 2024-07-27 DIAGNOSIS — M62.82 NON-TRAUMATIC RHABDOMYOLYSIS: ICD-10-CM

## 2024-07-27 DIAGNOSIS — M19.90 OSTEOARTHRITIS, UNSPECIFIED OSTEOARTHRITIS TYPE, UNSPECIFIED SITE: ICD-10-CM

## 2024-07-27 DIAGNOSIS — F03.90 DEMENTIA, UNSPECIFIED DEMENTIA SEVERITY, UNSPECIFIED DEMENTIA TYPE, UNSPECIFIED WHETHER BEHAVIORAL, PSYCHOTIC, OR MOOD DISTURBANCE OR ANXIETY (MULTI): Primary | ICD-10-CM

## 2024-07-27 DIAGNOSIS — N39.0 URINARY TRACT INFECTION WITHOUT HEMATURIA, SITE UNSPECIFIED: ICD-10-CM

## 2024-07-27 PROCEDURE — 99309 SBSQ NF CARE MODERATE MDM 30: CPT | Performed by: INTERNAL MEDICINE

## 2024-07-27 NOTE — LETTER
Patient: Riya Hernandez  : 1940    Encounter Date: 2024    PLACE OF SERVICE:  [facility]    This is a subsequent visit.    Subjective  Patient ID: Riya Hernandez is a 83 y.o. female who presents for Follow-up.    Ms. Riya Hernandez is an 83-year-old female with history of dementia with COPD.  She is unable to care for herself and requires supportive care.    Review of Systems   Constitutional:  Negative for chills and fever.   Cardiovascular:  Negative for chest pain.   All other systems reviewed and are negative.    Objective  /78   Pulse 76   Temp 36.6 °C (97.9 °F)   Resp 18     Physical Exam  Vitals reviewed.   Constitutional:       General: She is not in acute distress.     Comments: This is a well-developed and well-nourished female, sitting in a chair   HENT:      Right Ear: Tympanic membrane, ear canal and external ear normal.      Left Ear: Tympanic membrane, ear canal and external ear normal.   Eyes:      General: No scleral icterus.     Pupils: Pupils are equal, round, and reactive to light.   Neck:      Vascular: No carotid bruit.   Cardiovascular:      Heart sounds: Normal heart sounds, S1 normal and S2 normal. No murmur heard.     No friction rub.   Pulmonary:      Breath sounds: Decreased breath sounds (throughout) present.   Abdominal:      Palpations: There is no hepatomegaly, splenomegaly or mass.   Musculoskeletal:         General: No swelling or deformity. Normal range of motion.      Cervical back: Neck supple.      Right lower leg: No edema.      Left lower leg: No edema.   Lymphadenopathy:      Cervical: No cervical adenopathy.      Upper Body:      Right upper body: No axillary adenopathy.      Left upper body: No axillary adenopathy.      Lower Body: No right inguinal adenopathy. No left inguinal adenopathy.   Neurological:      Mental Status: She is oriented to person, place, and time.      Cranial Nerves: Cranial nerves 2-12 are intact. No  cranial nerve deficit.      Sensory: No sensory deficit.      Motor: Motor function is intact. No weakness.      Gait: Gait is intact.      Deep Tendon Reflexes: Reflexes normal.   Psychiatric:         Mood and Affect: Mood normal. Mood is not anxious or depressed. Affect is not angry.         Behavior: Behavior is not agitated.         Thought Content: Thought content normal.         Judgment: Judgment normal.     LAB WORK: Laboratory studies were reviewed.    Assessment/Plan  Problem List Items Addressed This Visit             ICD-10-CM       Cardiac and Vasculature    Coronary artery disease I25.10    Hypertensive disorder I10    Hyperlipidemia E78.5       Genitourinary and Reproductive    UTI (urinary tract infection) N39.0       Neuro    Dementia (Multi) - Primary F03.90     Other Visit Diagnoses         Codes    Chronic obstructive pulmonary disease, unspecified COPD type (Multi)     J44.9    Non-traumatic rhabdomyolysis     M62.82    Osteoarthritis, unspecified osteoarthritis type, unspecified site     M19.90    Weakness     R53.1    At risk for falling     Z91.81        1. Dementia, unchanged.  2. COPD, on bronchodilator therapy.  3. Recent rhabdomyolysis, continue to monitor.  4. Recent UTI, continue to monitor.  5. Hypertension, med controlled.  6. Hyperlipidemia, on statin.  7. Coronary artery disease, on aspirin.  8. Osteoarthritis, on Tylenol.  9. Weakness, on PT/OT.  10. Fall risk, fall precaution.    Scribe Attestation  By signing my name below, IEleanor Scribe attest that this documentation has been prepared under the direction and in the presence of Hermann Regan MD.     All medical record entries made by the scribe were personally dictated by me I have reviewed the chart and agree the record accurately reflects my personal performance of his history physical examination and management      Electronically Signed By: Hermann Regan MD   8/1/24 11:55 PM

## 2024-07-29 VITALS
RESPIRATION RATE: 18 BRPM | DIASTOLIC BLOOD PRESSURE: 78 MMHG | SYSTOLIC BLOOD PRESSURE: 128 MMHG | TEMPERATURE: 97.9 F | HEART RATE: 76 BPM

## 2024-07-29 ASSESSMENT — ENCOUNTER SYMPTOMS
FEVER: 0
CHILLS: 0

## 2024-07-29 NOTE — PROGRESS NOTES
PLACE OF SERVICE:  [facility]    This is a subsequent visit.    Subjective   Patient ID: Riya Hernandez is a 83 y.o. female who presents for Follow-up.    Ms. Riya Hernandez is an 83-year-old female with history of dementia with COPD.  She is unable to care for herself and requires supportive care.    Review of Systems   Constitutional:  Negative for chills and fever.   Cardiovascular:  Negative for chest pain.   All other systems reviewed and are negative.    Objective   /78   Pulse 76   Temp 36.6 °C (97.9 °F)   Resp 18     Physical Exam  Vitals reviewed.   Constitutional:       General: She is not in acute distress.     Comments: This is a well-developed and well-nourished female, sitting in a chair   HENT:      Right Ear: Tympanic membrane, ear canal and external ear normal.      Left Ear: Tympanic membrane, ear canal and external ear normal.   Eyes:      General: No scleral icterus.     Pupils: Pupils are equal, round, and reactive to light.   Neck:      Vascular: No carotid bruit.   Cardiovascular:      Heart sounds: Normal heart sounds, S1 normal and S2 normal. No murmur heard.     No friction rub.   Pulmonary:      Breath sounds: Decreased breath sounds (throughout) present.   Abdominal:      Palpations: There is no hepatomegaly, splenomegaly or mass.   Musculoskeletal:         General: No swelling or deformity. Normal range of motion.      Cervical back: Neck supple.      Right lower leg: No edema.      Left lower leg: No edema.   Lymphadenopathy:      Cervical: No cervical adenopathy.      Upper Body:      Right upper body: No axillary adenopathy.      Left upper body: No axillary adenopathy.      Lower Body: No right inguinal adenopathy. No left inguinal adenopathy.   Neurological:      Mental Status: She is oriented to person, place, and time.      Cranial Nerves: Cranial nerves 2-12 are intact. No cranial nerve deficit.      Sensory: No sensory deficit.      Motor:  Motor function is intact. No weakness.      Gait: Gait is intact.      Deep Tendon Reflexes: Reflexes normal.   Psychiatric:         Mood and Affect: Mood normal. Mood is not anxious or depressed. Affect is not angry.         Behavior: Behavior is not agitated.         Thought Content: Thought content normal.         Judgment: Judgment normal.     LAB WORK: Laboratory studies were reviewed.    Assessment/Plan   Problem List Items Addressed This Visit             ICD-10-CM       Cardiac and Vasculature    Coronary artery disease I25.10    Hypertensive disorder I10    Hyperlipidemia E78.5       Genitourinary and Reproductive    UTI (urinary tract infection) N39.0       Neuro    Dementia (Multi) - Primary F03.90     Other Visit Diagnoses         Codes    Chronic obstructive pulmonary disease, unspecified COPD type (Multi)     J44.9    Non-traumatic rhabdomyolysis     M62.82    Osteoarthritis, unspecified osteoarthritis type, unspecified site     M19.90    Weakness     R53.1    At risk for falling     Z91.81        1. Dementia, unchanged.  2. COPD, on bronchodilator therapy.  3. Recent rhabdomyolysis, continue to monitor.  4. Recent UTI, continue to monitor.  5. Hypertension, med controlled.  6. Hyperlipidemia, on statin.  7. Coronary artery disease, on aspirin.  8. Osteoarthritis, on Tylenol.  9. Weakness, on PT/OT.  10. Fall risk, fall precaution.    Scribe Attestation  By signing my name below, IEleanor Scribe attest that this documentation has been prepared under the direction and in the presence of Hermann Regan MD.     All medical record entries made by the scribe were personally dictated by me I have reviewed the chart and agree the record accurately reflects my personal performance of his history physical examination and management     No

## 2024-08-03 ENCOUNTER — NURSING HOME VISIT (OUTPATIENT)
Dept: POST ACUTE CARE | Facility: EXTERNAL LOCATION | Age: 84
End: 2024-08-03
Payer: MEDICARE

## 2024-08-03 DIAGNOSIS — F03.90 DEMENTIA, UNSPECIFIED DEMENTIA SEVERITY, UNSPECIFIED DEMENTIA TYPE, UNSPECIFIED WHETHER BEHAVIORAL, PSYCHOTIC, OR MOOD DISTURBANCE OR ANXIETY (MULTI): ICD-10-CM

## 2024-08-03 DIAGNOSIS — M19.90 OSTEOARTHRITIS, UNSPECIFIED OSTEOARTHRITIS TYPE, UNSPECIFIED SITE: ICD-10-CM

## 2024-08-03 DIAGNOSIS — E78.5 HYPERLIPIDEMIA, UNSPECIFIED HYPERLIPIDEMIA TYPE: ICD-10-CM

## 2024-08-03 DIAGNOSIS — N39.0 URINARY TRACT INFECTION WITHOUT HEMATURIA, SITE UNSPECIFIED: ICD-10-CM

## 2024-08-03 DIAGNOSIS — I25.10 ASHD (ARTERIOSCLEROTIC HEART DISEASE): ICD-10-CM

## 2024-08-03 DIAGNOSIS — I10 HYPERTENSION, UNSPECIFIED TYPE: ICD-10-CM

## 2024-08-03 DIAGNOSIS — R53.1 WEAKNESS: ICD-10-CM

## 2024-08-03 DIAGNOSIS — Z91.81 AT RISK FOR FALLING: ICD-10-CM

## 2024-08-03 DIAGNOSIS — J44.9 CHRONIC OBSTRUCTIVE PULMONARY DISEASE, UNSPECIFIED COPD TYPE (MULTI): Primary | ICD-10-CM

## 2024-08-03 PROCEDURE — 99308 SBSQ NF CARE LOW MDM 20: CPT | Performed by: INTERNAL MEDICINE

## 2024-08-03 NOTE — LETTER
Patient: Riya Hernandez  : 1940    Encounter Date: 2024    PLACE OF SERVICE:  [facility]    This is a subsequent visit.    Subjective  Patient ID: Riya Hernandez is a 83 y.o. female who presents for Follow-up.    Ms. Riya Hernandez is an 83-year-old female with history of COPD and Alzheimer's dementia.  She has had recent UTI.  She is unable to care for herself and requires supportive care.    Review of Systems   Constitutional:  Negative for chills and fever.   Cardiovascular:  Negative for chest pain.   All other systems reviewed and are negative.    Objective  /78   Pulse 74   Temp 36.6 °C (97.9 °F)   Resp 18     Physical Exam  Vitals reviewed.   Constitutional:       Comments: This is a well-developed and well-nourished female, lying in bed, appearing weak.   HENT:      Right Ear: Tympanic membrane, ear canal and external ear normal.      Left Ear: Tympanic membrane, ear canal and external ear normal.   Eyes:      General: No scleral icterus.     Pupils: Pupils are equal, round, and reactive to light.   Neck:      Vascular: No carotid bruit.   Cardiovascular:      Heart sounds: Normal heart sounds, S1 normal and S2 normal. No murmur heard.     No friction rub.   Pulmonary:      Breath sounds: Decreased breath sounds (throughout) present.   Abdominal:      Palpations: There is no hepatomegaly, splenomegaly or mass.   Musculoskeletal:         General: No swelling or deformity. Normal range of motion.      Cervical back: Neck supple.      Right lower leg: No edema.      Left lower leg: No edema.   Lymphadenopathy:      Cervical: No cervical adenopathy.      Upper Body:      Right upper body: No axillary adenopathy.      Left upper body: No axillary adenopathy.      Lower Body: No right inguinal adenopathy. No left inguinal adenopathy.   Neurological:      Mental Status: She is oriented to person, place, and time. She is lethargic.      Cranial Nerves: Cranial  nerves 2-12 are intact. No cranial nerve deficit.      Sensory: No sensory deficit.      Motor: Motor function is intact. No weakness.      Gait: Gait is intact.      Deep Tendon Reflexes: Reflexes normal.   Psychiatric:         Mood and Affect: Mood normal. Mood is not anxious or depressed. Affect is not angry.         Behavior: Behavior is not agitated.         Thought Content: Thought content normal.         Judgment: Judgment normal.     LAB WORK:  Laboratory studies reviewed.    Assessment/Plan  Problem List Items Addressed This Visit             ICD-10-CM       Cardiac and Vasculature    Hypertensive disorder I10    Hyperlipidemia E78.5       Genitourinary and Reproductive    UTI (urinary tract infection) N39.0       Neuro    Dementia (Multi) F03.90     Other Visit Diagnoses         Codes    Chronic obstructive pulmonary disease, unspecified COPD type (Multi)    -  Primary J44.9    Osteoarthritis, unspecified osteoarthritis type, unspecified site     M19.90    Weakness     R53.1    At risk for falling     Z91.81    ASHD (arteriosclerotic heart disease)     I25.10        1. COPD, on bronchodilator therapy.  2. Dementia, unchanged.  3. Recent UTI, continue to monitor.  4. Hyperlipidemia, on statin.  5. Hypertension, med controlled.  6. Osteoarthritis, on Tylenol.  7. Weakness, on PT/OT.  8. Fall risk, fall precaution.  9. ASHD, on aspirin.    Scribe Attestation  By signing my name below, IEleanor Scribe attest that this documentation has been prepared under the direction and in the presence of Hermann Regan MD.     All medical record entries made by the scribe were personally dictated by me I have reviewed the chart and agree the record accurately reflects my personal performance of his history physical examination and management      Electronically Signed By: Hermann Regan MD   8/5/24 11:44 PM

## 2024-08-05 VITALS
HEART RATE: 74 BPM | DIASTOLIC BLOOD PRESSURE: 78 MMHG | TEMPERATURE: 97.9 F | SYSTOLIC BLOOD PRESSURE: 126 MMHG | RESPIRATION RATE: 18 BRPM

## 2024-08-05 ASSESSMENT — ENCOUNTER SYMPTOMS
CHILLS: 0
FEVER: 0

## 2024-08-05 NOTE — PROGRESS NOTES
PLACE OF SERVICE:  [facility]    This is a subsequent visit.    Subjective   Patient ID: Riya Hernandez is a 83 y.o. female who presents for Follow-up.    Ms. Riya Hernandez is an 83-year-old female with history of COPD and Alzheimer's dementia.  She has had recent UTI.  She is unable to care for herself and requires supportive care.    Review of Systems   Constitutional:  Negative for chills and fever.   Cardiovascular:  Negative for chest pain.   All other systems reviewed and are negative.    Objective   /78   Pulse 74   Temp 36.6 °C (97.9 °F)   Resp 18     Physical Exam  Vitals reviewed.   Constitutional:       Comments: This is a well-developed and well-nourished female, lying in bed, appearing weak.   HENT:      Right Ear: Tympanic membrane, ear canal and external ear normal.      Left Ear: Tympanic membrane, ear canal and external ear normal.   Eyes:      General: No scleral icterus.     Pupils: Pupils are equal, round, and reactive to light.   Neck:      Vascular: No carotid bruit.   Cardiovascular:      Heart sounds: Normal heart sounds, S1 normal and S2 normal. No murmur heard.     No friction rub.   Pulmonary:      Breath sounds: Decreased breath sounds (throughout) present.   Abdominal:      Palpations: There is no hepatomegaly, splenomegaly or mass.   Musculoskeletal:         General: No swelling or deformity. Normal range of motion.      Cervical back: Neck supple.      Right lower leg: No edema.      Left lower leg: No edema.   Lymphadenopathy:      Cervical: No cervical adenopathy.      Upper Body:      Right upper body: No axillary adenopathy.      Left upper body: No axillary adenopathy.      Lower Body: No right inguinal adenopathy. No left inguinal adenopathy.   Neurological:      Mental Status: She is oriented to person, place, and time. She is lethargic.      Cranial Nerves: Cranial nerves 2-12 are intact. No cranial nerve deficit.      Sensory: No sensory  deficit.      Motor: Motor function is intact. No weakness.      Gait: Gait is intact.      Deep Tendon Reflexes: Reflexes normal.   Psychiatric:         Mood and Affect: Mood normal. Mood is not anxious or depressed. Affect is not angry.         Behavior: Behavior is not agitated.         Thought Content: Thought content normal.         Judgment: Judgment normal.     LAB WORK:  Laboratory studies reviewed.    Assessment/Plan   Problem List Items Addressed This Visit             ICD-10-CM       Cardiac and Vasculature    Hypertensive disorder I10    Hyperlipidemia E78.5       Genitourinary and Reproductive    UTI (urinary tract infection) N39.0       Neuro    Dementia (Multi) F03.90     Other Visit Diagnoses         Codes    Chronic obstructive pulmonary disease, unspecified COPD type (Multi)    -  Primary J44.9    Osteoarthritis, unspecified osteoarthritis type, unspecified site     M19.90    Weakness     R53.1    At risk for falling     Z91.81    ASHD (arteriosclerotic heart disease)     I25.10        1. COPD, on bronchodilator therapy.  2. Dementia, unchanged.  3. Recent UTI, continue to monitor.  4. Hyperlipidemia, on statin.  5. Hypertension, med controlled.  6. Osteoarthritis, on Tylenol.  7. Weakness, on PT/OT.  8. Fall risk, fall precaution.  9. ASHD, on aspirin.    Scribe Attestation  By signing my name below, I, Estevan Chapman attest that this documentation has been prepared under the direction and in the presence of Hermann Regan MD.     All medical record entries made by the scribe were personally dictated by me I have reviewed the chart and agree the record accurately reflects my personal performance of his history physical examination and management

## 2024-08-11 ENCOUNTER — NURSING HOME VISIT (OUTPATIENT)
Dept: POST ACUTE CARE | Facility: EXTERNAL LOCATION | Age: 84
End: 2024-08-11
Payer: COMMERCIAL

## 2024-08-11 DIAGNOSIS — F03.90 DEMENTIA, UNSPECIFIED DEMENTIA SEVERITY, UNSPECIFIED DEMENTIA TYPE, UNSPECIFIED WHETHER BEHAVIORAL, PSYCHOTIC, OR MOOD DISTURBANCE OR ANXIETY (MULTI): Primary | ICD-10-CM

## 2024-08-11 DIAGNOSIS — E78.5 HYPERLIPIDEMIA, UNSPECIFIED HYPERLIPIDEMIA TYPE: ICD-10-CM

## 2024-08-11 DIAGNOSIS — Z91.81 AT RISK FOR FALLING: ICD-10-CM

## 2024-08-11 DIAGNOSIS — M19.90 OSTEOARTHRITIS, UNSPECIFIED OSTEOARTHRITIS TYPE, UNSPECIFIED SITE: ICD-10-CM

## 2024-08-11 DIAGNOSIS — J44.9 CHRONIC OBSTRUCTIVE PULMONARY DISEASE, UNSPECIFIED COPD TYPE (MULTI): ICD-10-CM

## 2024-08-11 DIAGNOSIS — I10 HYPERTENSION, UNSPECIFIED TYPE: ICD-10-CM

## 2024-08-11 DIAGNOSIS — N39.0 URINARY TRACT INFECTION WITHOUT HEMATURIA, SITE UNSPECIFIED: ICD-10-CM

## 2024-08-11 DIAGNOSIS — F41.9 ANXIETY: ICD-10-CM

## 2024-08-11 DIAGNOSIS — R53.1 WEAKNESS: ICD-10-CM

## 2024-08-11 DIAGNOSIS — I25.10 ASHD (ARTERIOSCLEROTIC HEART DISEASE): ICD-10-CM

## 2024-08-11 NOTE — LETTER
Patient: Riya Hernandez  : 1940    Encounter Date: 2024    PLACE OF SERVICE:  [facility]    This is a subsequent visit.    Subjective  Patient ID: Riya Hernandez is a 83 y.o. female who presents for Follow-up.    Ms. Riya Hernandez is an 83-year-old female with history of COPD and dementia.  She is in a fall risk.  She is unable to care for herself and requires supportive care.    Review of Systems   Constitutional:  Negative for chills and fever.   Cardiovascular:  Negative for chest pain.   All other systems reviewed and are negative.    Objective  /76   Pulse 78   Temp 36.5 °C (97.7 °F)   Resp 18     Physical Exam  Vitals reviewed.   Constitutional:       General: She is not in acute distress.     Comments: This is a well-developed, well-nourished female, sitting in a chair   HENT:      Right Ear: Tympanic membrane, ear canal and external ear normal.      Left Ear: Tympanic membrane, ear canal and external ear normal.   Eyes:      General: No scleral icterus.     Pupils: Pupils are equal, round, and reactive to light.   Neck:      Vascular: No carotid bruit.   Cardiovascular:      Heart sounds: Normal heart sounds, S1 normal and S2 normal. No murmur heard.     No friction rub.   Pulmonary:      Breath sounds: Decreased breath sounds (throughout) present.   Abdominal:      Palpations: There is no hepatomegaly, splenomegaly or mass.   Musculoskeletal:         General: No swelling or deformity. Normal range of motion.      Cervical back: Neck supple.      Right lower leg: No edema.      Left lower leg: No edema.   Lymphadenopathy:      Cervical: No cervical adenopathy.      Upper Body:      Right upper body: No axillary adenopathy.      Left upper body: No axillary adenopathy.      Lower Body: No right inguinal adenopathy. No left inguinal adenopathy.   Neurological:      Mental Status: She is alert.      Cranial Nerves: Cranial nerves 2-12 are intact. No cranial  nerve deficit.      Sensory: No sensory deficit.      Motor: Motor function is intact. No weakness.      Gait: Gait is intact.      Deep Tendon Reflexes: Reflexes normal.      Comments: The patient is oriented x2.   Psychiatric:         Mood and Affect: Mood normal. Mood is not anxious or depressed. Affect is not angry.         Behavior: Behavior is not agitated.         Thought Content: Thought content normal.         Judgment: Judgment normal.     LAB WORK:  Laboratory studies were reviewed.    Assessment/Plan  Problem List Items Addressed This Visit             ICD-10-CM       Cardiac and Vasculature    Hypertensive disorder I10    Hyperlipidemia E78.5       Genitourinary and Reproductive    UTI (urinary tract infection) N39.0       Mental Health    Anxiety F41.9       Neuro    Dementia (Multi) - Primary F03.90     Other Visit Diagnoses         Codes    Chronic obstructive pulmonary disease, unspecified COPD type (Multi)     J44.9    ASHD (arteriosclerotic heart disease)     I25.10    Osteoarthritis, unspecified osteoarthritis type, unspecified site     M19.90    Weakness     R53.1    At risk for falling     Z91.81          1. Dementia, unchanged.  2. COPD, on bronchodilator therapy.  3. Hypertension, medically controlled.  4. Hyperlipidemia, on statin.  5. ASHD, on aspirin.  6. Osteoarthritis, on Tylenol.  7. Recent UTI.  Continue to monitor.  8. Weakness, on PT/OT.  9. Fall risk, on fall precautions.  10. Anxiety, on medication.    Scribe Attestation  By signing my name below, IEleanor Scribe attest that this documentation has been prepared under the direction and in the presence of Hermann Regan MD.     All medical record entries made by the scribe were personally dictated by me I have reviewed the chart and agree the record accurately reflects my personal performance of his history physical examination and management      Electronically Signed By: Hermann Regan MD   8/16/24 11:42 PM

## 2024-08-14 VITALS
TEMPERATURE: 97.7 F | DIASTOLIC BLOOD PRESSURE: 76 MMHG | HEART RATE: 78 BPM | SYSTOLIC BLOOD PRESSURE: 124 MMHG | RESPIRATION RATE: 18 BRPM

## 2024-08-14 ASSESSMENT — ENCOUNTER SYMPTOMS
FEVER: 0
CHILLS: 0

## 2024-08-14 NOTE — PROGRESS NOTES
PLACE OF SERVICE:  [facility]    This is a subsequent visit.    Subjective   Patient ID: Riya Hernandez is a 83 y.o. female who presents for Follow-up.    Ms. Riya Hernandez is an 83-year-old female with history of COPD and dementia.  She is in a fall risk.  She is unable to care for herself and requires supportive care.    Review of Systems   Constitutional:  Negative for chills and fever.   Cardiovascular:  Negative for chest pain.   All other systems reviewed and are negative.    Objective   /76   Pulse 78   Temp 36.5 °C (97.7 °F)   Resp 18     Physical Exam  Vitals reviewed.   Constitutional:       General: She is not in acute distress.     Comments: This is a well-developed, well-nourished female, sitting in a chair   HENT:      Right Ear: Tympanic membrane, ear canal and external ear normal.      Left Ear: Tympanic membrane, ear canal and external ear normal.   Eyes:      General: No scleral icterus.     Pupils: Pupils are equal, round, and reactive to light.   Neck:      Vascular: No carotid bruit.   Cardiovascular:      Heart sounds: Normal heart sounds, S1 normal and S2 normal. No murmur heard.     No friction rub.   Pulmonary:      Breath sounds: Decreased breath sounds (throughout) present.   Abdominal:      Palpations: There is no hepatomegaly, splenomegaly or mass.   Musculoskeletal:         General: No swelling or deformity. Normal range of motion.      Cervical back: Neck supple.      Right lower leg: No edema.      Left lower leg: No edema.   Lymphadenopathy:      Cervical: No cervical adenopathy.      Upper Body:      Right upper body: No axillary adenopathy.      Left upper body: No axillary adenopathy.      Lower Body: No right inguinal adenopathy. No left inguinal adenopathy.   Neurological:      Mental Status: She is alert.      Cranial Nerves: Cranial nerves 2-12 are intact. No cranial nerve deficit.      Sensory: No sensory deficit.      Motor: Motor function  is intact. No weakness.      Gait: Gait is intact.      Deep Tendon Reflexes: Reflexes normal.      Comments: The patient is oriented x2.   Psychiatric:         Mood and Affect: Mood normal. Mood is not anxious or depressed. Affect is not angry.         Behavior: Behavior is not agitated.         Thought Content: Thought content normal.         Judgment: Judgment normal.     LAB WORK:  Laboratory studies were reviewed.    Assessment/Plan   Problem List Items Addressed This Visit             ICD-10-CM       Cardiac and Vasculature    Hypertensive disorder I10    Hyperlipidemia E78.5       Genitourinary and Reproductive    UTI (urinary tract infection) N39.0       Mental Health    Anxiety F41.9       Neuro    Dementia (Multi) - Primary F03.90     Other Visit Diagnoses         Codes    Chronic obstructive pulmonary disease, unspecified COPD type (Multi)     J44.9    ASHD (arteriosclerotic heart disease)     I25.10    Osteoarthritis, unspecified osteoarthritis type, unspecified site     M19.90    Weakness     R53.1    At risk for falling     Z91.81          1. Dementia, unchanged.  2. COPD, on bronchodilator therapy.  3. Hypertension, medically controlled.  4. Hyperlipidemia, on statin.  5. ASHD, on aspirin.  6. Osteoarthritis, on Tylenol.  7. Recent UTI.  Continue to monitor.  8. Weakness, on PT/OT.  9. Fall risk, on fall precautions.  10. Anxiety, on medication.    Scribe Attestation  By signing my name below, IEleanor Scribe attest that this documentation has been prepared under the direction and in the presence of Hermann Regan MD.     All medical record entries made by the scribe were personally dictated by me I have reviewed the chart and agree the record accurately reflects my personal performance of his history physical examination and management

## 2024-08-18 ENCOUNTER — NURSING HOME VISIT (OUTPATIENT)
Dept: POST ACUTE CARE | Facility: EXTERNAL LOCATION | Age: 84
End: 2024-08-18
Payer: COMMERCIAL

## 2024-08-18 DIAGNOSIS — M19.90 OSTEOARTHRITIS, UNSPECIFIED OSTEOARTHRITIS TYPE, UNSPECIFIED SITE: ICD-10-CM

## 2024-08-18 DIAGNOSIS — J44.9 CHRONIC OBSTRUCTIVE PULMONARY DISEASE, UNSPECIFIED COPD TYPE (MULTI): Primary | ICD-10-CM

## 2024-08-18 DIAGNOSIS — Z91.81 AT RISK FOR FALLING: ICD-10-CM

## 2024-08-18 DIAGNOSIS — I10 HYPERTENSION, UNSPECIFIED TYPE: ICD-10-CM

## 2024-08-18 DIAGNOSIS — R53.1 WEAKNESS: ICD-10-CM

## 2024-08-18 DIAGNOSIS — F41.9 ANXIETY: ICD-10-CM

## 2024-08-18 DIAGNOSIS — I25.10 ASHD (ARTERIOSCLEROTIC HEART DISEASE): ICD-10-CM

## 2024-08-18 DIAGNOSIS — E78.5 HYPERLIPIDEMIA, UNSPECIFIED HYPERLIPIDEMIA TYPE: ICD-10-CM

## 2024-08-18 DIAGNOSIS — F03.90 DEMENTIA, UNSPECIFIED DEMENTIA SEVERITY, UNSPECIFIED DEMENTIA TYPE, UNSPECIFIED WHETHER BEHAVIORAL, PSYCHOTIC, OR MOOD DISTURBANCE OR ANXIETY (MULTI): ICD-10-CM

## 2024-08-18 NOTE — LETTER
Patient: Riya Hernandez  : 1940    Encounter Date: 2024    PLACE OF SERVICE:  [facility]    This is a subsequent visit.    Subjective  Patient ID: Riya Hernandez is a 83 y.o. female who presents for Follow-up.    Ms. Riya Hernandez is an 83-year-old female with history of COPD and dementia.  She is unable to care for herself and requires supportive care.    Review of Systems   Constitutional:  Negative for chills and fever.   Cardiovascular:  Negative for chest pain.   All other systems reviewed and are negative.    Objective  /80   Pulse 80   Temp 36.5 °C (97.7 °F)   Resp 18     Physical Exam  Vitals reviewed.   Constitutional:       General: She is not in acute distress.     Comments: This is a well-developed, well-nourished female, sitting in a chair.   HENT:      Right Ear: Tympanic membrane, ear canal and external ear normal.      Left Ear: Tympanic membrane, ear canal and external ear normal.   Eyes:      General: No scleral icterus.     Pupils: Pupils are equal, round, and reactive to light.   Neck:      Vascular: No carotid bruit.   Cardiovascular:      Heart sounds: Normal heart sounds, S1 normal and S2 normal. No murmur heard.     No friction rub.   Pulmonary:      Breath sounds: Decreased breath sounds (throughout.) present.   Abdominal:      Palpations: There is no hepatomegaly, splenomegaly or mass.   Musculoskeletal:         General: No swelling or deformity. Normal range of motion.      Cervical back: Neck supple.      Right lower leg: No edema.      Left lower leg: No edema.   Lymphadenopathy:      Cervical: No cervical adenopathy.      Upper Body:      Right upper body: No axillary adenopathy.      Left upper body: No axillary adenopathy.      Lower Body: No right inguinal adenopathy. No left inguinal adenopathy.   Neurological:      Mental Status: She is alert.      Cranial Nerves: Cranial nerves 2-12 are intact. No cranial nerve deficit.       Sensory: No sensory deficit.      Motor: Motor function is intact. No weakness.      Gait: Gait is intact.      Deep Tendon Reflexes: Reflexes normal.      Comments: The patient is oriented x2.   Psychiatric:         Mood and Affect: Mood normal. Mood is not anxious or depressed. Affect is not angry.         Behavior: Behavior is not agitated.         Thought Content: Thought content normal.         Judgment: Judgment normal.     LAB WORK:  Laboratory studies were reviewed.    Assessment/Plan  Problem List Items Addressed This Visit             ICD-10-CM       Cardiac and Vasculature    Hypertensive disorder I10    Hyperlipidemia E78.5       Mental Health    Anxiety F41.9       Neuro    Dementia (Multi) F03.90     Other Visit Diagnoses         Codes    Chronic obstructive pulmonary disease, unspecified COPD type (Multi)    -  Primary J44.9    Osteoarthritis, unspecified osteoarthritis type, unspecified site     M19.90    ASHD (arteriosclerotic heart disease)     I25.10    Weakness     R53.1    At risk for falling     Z91.81        1. COPD, on bronchodilator therapy.  2. Hypertension, medically controlled.  3. Hyperlipidemia, on statin.  4. Dementia, unchanged.  5. Osteoarthritis, on Tylenol.  6. ASHD, on aspirin.  7. Anxiety, on medication.  8. Weakness, on PT/OT.  9. Fall risk, on fall precautions.    Scribe Attestation  By signing my name below, I, Eleanor Santana, Scribkasi attest that this documentation has been prepared under the direction and in the presence of Hermann Regan MD.       Electronically Signed By: Hermann Regan MD   8/23/24 11:53 PM

## 2024-08-21 VITALS
DIASTOLIC BLOOD PRESSURE: 80 MMHG | RESPIRATION RATE: 18 BRPM | HEART RATE: 80 BPM | TEMPERATURE: 97.7 F | SYSTOLIC BLOOD PRESSURE: 130 MMHG

## 2024-08-21 ASSESSMENT — ENCOUNTER SYMPTOMS
CHILLS: 0
FEVER: 0

## 2024-08-21 NOTE — PROGRESS NOTES
PLACE OF SERVICE:  [facility]    This is a subsequent visit.    Subjective   Patient ID: Riya Hernandez is a 83 y.o. female who presents for Follow-up.    Ms. Riya Hernandez is an 83-year-old female with history of COPD and dementia.  She is unable to care for herself and requires supportive care.    Review of Systems   Constitutional:  Negative for chills and fever.   Cardiovascular:  Negative for chest pain.   All other systems reviewed and are negative.    Objective   /80   Pulse 80   Temp 36.5 °C (97.7 °F)   Resp 18     Physical Exam  Vitals reviewed.   Constitutional:       General: She is not in acute distress.     Comments: This is a well-developed, well-nourished female, sitting in a chair.   HENT:      Right Ear: Tympanic membrane, ear canal and external ear normal.      Left Ear: Tympanic membrane, ear canal and external ear normal.   Eyes:      General: No scleral icterus.     Pupils: Pupils are equal, round, and reactive to light.   Neck:      Vascular: No carotid bruit.   Cardiovascular:      Heart sounds: Normal heart sounds, S1 normal and S2 normal. No murmur heard.     No friction rub.   Pulmonary:      Breath sounds: Decreased breath sounds (throughout.) present.   Abdominal:      Palpations: There is no hepatomegaly, splenomegaly or mass.   Musculoskeletal:         General: No swelling or deformity. Normal range of motion.      Cervical back: Neck supple.      Right lower leg: No edema.      Left lower leg: No edema.   Lymphadenopathy:      Cervical: No cervical adenopathy.      Upper Body:      Right upper body: No axillary adenopathy.      Left upper body: No axillary adenopathy.      Lower Body: No right inguinal adenopathy. No left inguinal adenopathy.   Neurological:      Mental Status: She is alert.      Cranial Nerves: Cranial nerves 2-12 are intact. No cranial nerve deficit.      Sensory: No sensory deficit.      Motor: Motor function is intact. No  weakness.      Gait: Gait is intact.      Deep Tendon Reflexes: Reflexes normal.      Comments: The patient is oriented x2.   Psychiatric:         Mood and Affect: Mood normal. Mood is not anxious or depressed. Affect is not angry.         Behavior: Behavior is not agitated.         Thought Content: Thought content normal.         Judgment: Judgment normal.     LAB WORK:  Laboratory studies were reviewed.    Assessment/Plan   Problem List Items Addressed This Visit             ICD-10-CM       Cardiac and Vasculature    Hypertensive disorder I10    Hyperlipidemia E78.5       Mental Health    Anxiety F41.9       Neuro    Dementia (Multi) F03.90     Other Visit Diagnoses         Codes    Chronic obstructive pulmonary disease, unspecified COPD type (Multi)    -  Primary J44.9    Osteoarthritis, unspecified osteoarthritis type, unspecified site     M19.90    ASHD (arteriosclerotic heart disease)     I25.10    Weakness     R53.1    At risk for falling     Z91.81        1. COPD, on bronchodilator therapy.  2. Hypertension, medically controlled.  3. Hyperlipidemia, on statin.  4. Dementia, unchanged.  5. Osteoarthritis, on Tylenol.  6. ASHD, on aspirin.  7. Anxiety, on medication.  8. Weakness, on PT/OT.  9. Fall risk, on fall precautions.    Scribe Attestation  By signing my name below, IEleanor, Scribkasi attest that this documentation has been prepared under the direction and in the presence of Hermann Regan MD.

## 2024-08-23 ENCOUNTER — TELEPHONE (OUTPATIENT)
Age: 84
End: 2024-08-23
Payer: COMMERCIAL

## 2024-08-25 ENCOUNTER — NURSING HOME VISIT (OUTPATIENT)
Dept: POST ACUTE CARE | Facility: EXTERNAL LOCATION | Age: 84
End: 2024-08-25
Payer: MEDICARE

## 2024-08-25 DIAGNOSIS — R53.1 WEAKNESS: ICD-10-CM

## 2024-08-25 DIAGNOSIS — N18.30 STAGE 3 CHRONIC KIDNEY DISEASE, UNSPECIFIED WHETHER STAGE 3A OR 3B CKD (MULTI): ICD-10-CM

## 2024-08-25 DIAGNOSIS — F41.9 ANXIETY: ICD-10-CM

## 2024-08-25 DIAGNOSIS — Z91.81 AT RISK FOR FALLING: ICD-10-CM

## 2024-08-25 DIAGNOSIS — I10 HYPERTENSION, UNSPECIFIED TYPE: ICD-10-CM

## 2024-08-25 DIAGNOSIS — J44.9 CHRONIC OBSTRUCTIVE PULMONARY DISEASE, UNSPECIFIED COPD TYPE (MULTI): Primary | ICD-10-CM

## 2024-08-25 DIAGNOSIS — M19.90 OSTEOARTHRITIS, UNSPECIFIED OSTEOARTHRITIS TYPE, UNSPECIFIED SITE: ICD-10-CM

## 2024-08-25 DIAGNOSIS — I25.10 ASHD (ARTERIOSCLEROTIC HEART DISEASE): ICD-10-CM

## 2024-08-25 DIAGNOSIS — I25.118 CORONARY ARTERY DISEASE OF NATIVE ARTERY OF NATIVE HEART WITH STABLE ANGINA PECTORIS (CMS-HCC): ICD-10-CM

## 2024-08-25 DIAGNOSIS — E78.5 HYPERLIPIDEMIA, UNSPECIFIED HYPERLIPIDEMIA TYPE: ICD-10-CM

## 2024-08-25 DIAGNOSIS — F03.90 DEMENTIA, UNSPECIFIED DEMENTIA SEVERITY, UNSPECIFIED DEMENTIA TYPE, UNSPECIFIED WHETHER BEHAVIORAL, PSYCHOTIC, OR MOOD DISTURBANCE OR ANXIETY (MULTI): ICD-10-CM

## 2024-08-25 PROCEDURE — 99308 SBSQ NF CARE LOW MDM 20: CPT | Performed by: INTERNAL MEDICINE

## 2024-08-25 NOTE — LETTER
Patient: Riya Hernandez  : 1940    Encounter Date: 2024    PLACE OF SERVICE:  [facility]    This is a subsequent visit.    Subjective  Patient ID: Riya Hernandez is a 83 y.o. female who presents for Follow-up.    Ms. Riya Hernandez is an 83-year-old female with history of dementia and COPD.  She is unable to care for herself and manage her affairs.  She requires supportive care.    Review of Systems   Constitutional:  Negative for chills and fever.   Cardiovascular:  Negative for chest pain.   All other systems reviewed and are negative.    Objective  /78   Pulse 76   Temp 36.6 °C (97.8 °F)   Resp 18     Physical Exam  Vitals reviewed.   Constitutional:       General: She is not in acute distress.     Comments: This is a well-developed, well-nourished female, sitting in a chair   HENT:      Right Ear: Tympanic membrane, ear canal and external ear normal.      Left Ear: Tympanic membrane, ear canal and external ear normal.   Eyes:      General: No scleral icterus.     Pupils: Pupils are equal, round, and reactive to light.   Neck:      Vascular: No carotid bruit.   Cardiovascular:      Heart sounds: Normal heart sounds, S1 normal and S2 normal. No murmur heard.     No friction rub.   Pulmonary:      Breath sounds: Decreased breath sounds (throughout) present.   Abdominal:      Palpations: There is no hepatomegaly, splenomegaly or mass.   Musculoskeletal:         General: No swelling or deformity. Normal range of motion.      Cervical back: Neck supple.      Right lower leg: No edema.      Left lower leg: No edema.   Lymphadenopathy:      Cervical: No cervical adenopathy.      Upper Body:      Right upper body: No axillary adenopathy.      Left upper body: No axillary adenopathy.      Lower Body: No right inguinal adenopathy. No left inguinal adenopathy.   Neurological:      Mental Status: She is alert.      Cranial Nerves: Cranial nerves 2-12 are intact. No cranial  nerve deficit.      Sensory: No sensory deficit.      Motor: Motor function is intact. No weakness.      Gait: Gait is intact.      Deep Tendon Reflexes: Reflexes normal.      Comments: The patient is oriented x2.   Psychiatric:         Mood and Affect: Mood normal. Mood is not anxious or depressed. Affect is not angry.         Behavior: Behavior is not agitated.         Thought Content: Thought content normal.         Judgment: Judgment normal.     LAB WORK:  Laboratory studies were reviewed.    Assessment/Plan  Problem List Items Addressed This Visit             ICD-10-CM       Cardiac and Vasculature    Hypertensive disorder I10    Hyperlipidemia E78.5       Mental Health    Anxiety F41.9       Neuro    Dementia (Multi) F03.90     Other Visit Diagnoses         Codes    Chronic obstructive pulmonary disease, unspecified COPD type (Multi)    -  Primary J44.9    ASHD (arteriosclerotic heart disease)     I25.10    Osteoarthritis, unspecified osteoarthritis type, unspecified site     M19.90    Weakness     R53.1    At risk for falling     Z91.81        1. COPD, on bronchodilator therapy.  2. Dementia, unchanged.  3. Hypertension, medically controlled.  4. ASHD, on aspirin.  5. Osteoarthritis, on Tylenol.  6. Anxiety, on medication.  7. Hyperlipidemia, on statin.  8. Weakness, on PT/OT.  9. Fall risk, on fall precautions.    Scribe Attestation  By signing my name below, I, Estevan Chapman attest that this documentation has been prepared under the direction and in the presence of Hermann Regan MD.     All medical record entries made by the scribe were personally dictated by me I have reviewed the chart and agree the record accurately reflects my personal performance of his history physical examination and management      Electronically Signed By: Hermann Regan MD   8/26/24 11:58 PM

## 2024-08-26 VITALS
TEMPERATURE: 97.8 F | DIASTOLIC BLOOD PRESSURE: 78 MMHG | HEART RATE: 76 BPM | SYSTOLIC BLOOD PRESSURE: 126 MMHG | RESPIRATION RATE: 18 BRPM

## 2024-08-26 PROBLEM — N18.30 STAGE 3 CHRONIC KIDNEY DISEASE, UNSPECIFIED WHETHER STAGE 3A OR 3B CKD (MULTI): Status: ACTIVE | Noted: 2024-08-26

## 2024-08-26 PROBLEM — I25.118 CORONARY ARTERY DISEASE OF NATIVE ARTERY OF NATIVE HEART WITH STABLE ANGINA PECTORIS (CMS-HCC): Status: ACTIVE | Noted: 2023-08-31

## 2024-08-26 ASSESSMENT — ENCOUNTER SYMPTOMS
FEVER: 0
CHILLS: 0

## 2024-08-26 NOTE — PROGRESS NOTES
PLACE OF SERVICE:  [facility]    This is a subsequent visit.    Subjective   Patient ID: Riya Hernandez is a 83 y.o. female who presents for Follow-up.    Ms. Riya Hernandez is an 83-year-old female with history of dementia and COPD.  She is unable to care for herself and manage her affairs.  She requires supportive care.    Review of Systems   Constitutional:  Negative for chills and fever.   Cardiovascular:  Negative for chest pain.   All other systems reviewed and are negative.    Objective   /78   Pulse 76   Temp 36.6 °C (97.8 °F)   Resp 18     Physical Exam  Vitals reviewed.   Constitutional:       General: She is not in acute distress.     Comments: This is a well-developed, well-nourished female, sitting in a chair   HENT:      Right Ear: Tympanic membrane, ear canal and external ear normal.      Left Ear: Tympanic membrane, ear canal and external ear normal.   Eyes:      General: No scleral icterus.     Pupils: Pupils are equal, round, and reactive to light.   Neck:      Vascular: No carotid bruit.   Cardiovascular:      Heart sounds: Normal heart sounds, S1 normal and S2 normal. No murmur heard.     No friction rub.   Pulmonary:      Breath sounds: Decreased breath sounds (throughout) present.   Abdominal:      Palpations: There is no hepatomegaly, splenomegaly or mass.   Musculoskeletal:         General: No swelling or deformity. Normal range of motion.      Cervical back: Neck supple.      Right lower leg: No edema.      Left lower leg: No edema.   Lymphadenopathy:      Cervical: No cervical adenopathy.      Upper Body:      Right upper body: No axillary adenopathy.      Left upper body: No axillary adenopathy.      Lower Body: No right inguinal adenopathy. No left inguinal adenopathy.   Neurological:      Mental Status: She is alert.      Cranial Nerves: Cranial nerves 2-12 are intact. No cranial nerve deficit.      Sensory: No sensory deficit.      Motor: Motor  function is intact. No weakness.      Gait: Gait is intact.      Deep Tendon Reflexes: Reflexes normal.      Comments: The patient is oriented x2.   Psychiatric:         Mood and Affect: Mood normal. Mood is not anxious or depressed. Affect is not angry.         Behavior: Behavior is not agitated.         Thought Content: Thought content normal.         Judgment: Judgment normal.     LAB WORK:  Laboratory studies were reviewed.    Assessment/Plan   Problem List Items Addressed This Visit             ICD-10-CM       Cardiac and Vasculature    Hypertensive disorder I10    Hyperlipidemia E78.5       Mental Health    Anxiety F41.9       Neuro    Dementia (Multi) F03.90     Other Visit Diagnoses         Codes    Chronic obstructive pulmonary disease, unspecified COPD type (Multi)    -  Primary J44.9    ASHD (arteriosclerotic heart disease)     I25.10    Osteoarthritis, unspecified osteoarthritis type, unspecified site     M19.90    Weakness     R53.1    At risk for falling     Z91.81        1. COPD, on bronchodilator therapy.  2. Dementia, unchanged.  3. Hypertension, medically controlled.  4. ASHD, on aspirin.  5. Osteoarthritis, on Tylenol.  6. Anxiety, on medication.  7. Hyperlipidemia, on statin.  8. Weakness, on PT/OT.  9. Fall risk, on fall precautions.    Scribe Attestation  By signing my name below, IEleanor Scribe attest that this documentation has been prepared under the direction and in the presence of Hermann Regan MD.     All medical record entries made by the scribe were personally dictated by me I have reviewed the chart and agree the record accurately reflects my personal performance of his history physical examination and management

## 2024-09-01 ENCOUNTER — NURSING HOME VISIT (OUTPATIENT)
Dept: POST ACUTE CARE | Facility: EXTERNAL LOCATION | Age: 84
End: 2024-09-01
Payer: COMMERCIAL

## 2024-09-01 DIAGNOSIS — E78.5 HYPERLIPIDEMIA, UNSPECIFIED HYPERLIPIDEMIA TYPE: ICD-10-CM

## 2024-09-01 DIAGNOSIS — I25.10 ASHD (ARTERIOSCLEROTIC HEART DISEASE): ICD-10-CM

## 2024-09-01 DIAGNOSIS — J44.9 CHRONIC OBSTRUCTIVE PULMONARY DISEASE, UNSPECIFIED COPD TYPE (MULTI): Primary | ICD-10-CM

## 2024-09-01 DIAGNOSIS — R53.1 WEAKNESS: ICD-10-CM

## 2024-09-01 DIAGNOSIS — F03.90 DEMENTIA, UNSPECIFIED DEMENTIA SEVERITY, UNSPECIFIED DEMENTIA TYPE, UNSPECIFIED WHETHER BEHAVIORAL, PSYCHOTIC, OR MOOD DISTURBANCE OR ANXIETY (MULTI): ICD-10-CM

## 2024-09-01 DIAGNOSIS — I10 HYPERTENSION, UNSPECIFIED TYPE: ICD-10-CM

## 2024-09-01 DIAGNOSIS — N39.0 URINARY TRACT INFECTION WITHOUT HEMATURIA, SITE UNSPECIFIED: ICD-10-CM

## 2024-09-01 DIAGNOSIS — Z91.81 AT RISK FOR FALLING: ICD-10-CM

## 2024-09-01 PROCEDURE — 99308 SBSQ NF CARE LOW MDM 20: CPT | Performed by: INTERNAL MEDICINE

## 2024-09-01 NOTE — LETTER
Patient: Riya Hernandez  : 1940    Encounter Date: 2024    PLACE OF SERVICE:  [facility]    This is a subsequent visit.    Subjective  Patient ID: Riya Hernandez is a 83 y.o. female who presents for Follow-up.    Ms. Riya Hernandez is an 83-year-old female with history of dementia with COPD.  She is unable to care for herself and requires supportive care.    Review of Systems   Constitutional:  Negative for chills and fever.   Cardiovascular:  Negative for chest pain.   All other systems reviewed and are negative.    Objective  /82   Pulse 78   Temp 36.6 °C (97.8 °F)   Resp 18     Physical Exam  Vitals reviewed.   Constitutional:       General: She is not in acute distress.     Comments: This is a well-developed, well-nourished female, sitting in a chair.   HENT:      Right Ear: Tympanic membrane, ear canal and external ear normal.      Left Ear: Tympanic membrane, ear canal and external ear normal.   Eyes:      General: No scleral icterus.     Pupils: Pupils are equal, round, and reactive to light.   Neck:      Vascular: No carotid bruit.   Cardiovascular:      Heart sounds: Normal heart sounds, S1 normal and S2 normal. No murmur heard.     No friction rub.   Pulmonary:      Breath sounds: Decreased breath sounds (throughout) present.   Abdominal:      Palpations: There is no hepatomegaly, splenomegaly or mass.   Musculoskeletal:         General: No swelling or deformity. Normal range of motion.      Cervical back: Neck supple.      Right lower leg: No edema.      Left lower leg: No edema.   Lymphadenopathy:      Cervical: No cervical adenopathy.      Upper Body:      Right upper body: No axillary adenopathy.      Left upper body: No axillary adenopathy.      Lower Body: No right inguinal adenopathy. No left inguinal adenopathy.   Neurological:      Mental Status: She is alert.      Cranial Nerves: Cranial nerves 2-12 are intact. No cranial nerve deficit.       Sensory: No sensory deficit.      Motor: Motor function is intact. No weakness.      Gait: Gait is intact.      Deep Tendon Reflexes: Reflexes normal.      Comments: The patient is oriented x2.   Psychiatric:         Mood and Affect: Mood normal. Mood is not anxious or depressed. Affect is not angry.         Behavior: Behavior is not agitated.         Thought Content: Thought content normal.         Judgment: Judgment normal.     LAB WORK:  Laboratory studies were reviewed.    Assessment/Plan  Problem List Items Addressed This Visit             ICD-10-CM       Cardiac and Vasculature    Hypertensive disorder I10    Hyperlipidemia E78.5       Genitourinary and Reproductive    UTI (urinary tract infection) N39.0       Neuro    Dementia (Multi) F03.90     Other Visit Diagnoses         Codes    Chronic obstructive pulmonary disease, unspecified COPD type (Multi)    -  Primary J44.9    ASHD (arteriosclerotic heart disease)     I25.10    Weakness     R53.1    At risk for falling     Z91.81        1. COPD, on bronchodilator therapy.  2. Dementia, unchanged.  3. ASHD, on aspirin.  4. Hypertension, medically controlled.  5. Weakness, on PT/OT.  6. Fall risk, on fall precautions.  7. History of UTI.  Continue to monitor.  8. Hyperlipidemia, on statin.    Scribe Attestation  By signing my name below, IEleanor Scribe attest that this documentation has been prepared under the direction and in the presence of Hermann Regan MD.     All medical record entries made by the scribe were personally dictated by me I have reviewed the chart and agree the record accurately reflects my personal performance of his history physical examination and management      Electronically Signed By: Hermann Regan MD   9/8/24  9:37 PM

## 2024-09-07 VITALS
RESPIRATION RATE: 18 BRPM | TEMPERATURE: 97.8 F | SYSTOLIC BLOOD PRESSURE: 128 MMHG | DIASTOLIC BLOOD PRESSURE: 82 MMHG | HEART RATE: 78 BPM

## 2024-09-07 ASSESSMENT — ENCOUNTER SYMPTOMS
FEVER: 0
CHILLS: 0

## 2024-09-07 NOTE — PROGRESS NOTES
PLACE OF SERVICE:  [facility]    This is a subsequent visit.    Subjective   Patient ID: Riya Hernandez is a 83 y.o. female who presents for Follow-up.    Ms. Riya Hernandez is an 83-year-old female with history of dementia with COPD.  She is unable to care for herself and requires supportive care.    Review of Systems   Constitutional:  Negative for chills and fever.   Cardiovascular:  Negative for chest pain.   All other systems reviewed and are negative.    Objective   /82   Pulse 78   Temp 36.6 °C (97.8 °F)   Resp 18     Physical Exam  Vitals reviewed.   Constitutional:       General: She is not in acute distress.     Comments: This is a well-developed, well-nourished female, sitting in a chair.   HENT:      Right Ear: Tympanic membrane, ear canal and external ear normal.      Left Ear: Tympanic membrane, ear canal and external ear normal.   Eyes:      General: No scleral icterus.     Pupils: Pupils are equal, round, and reactive to light.   Neck:      Vascular: No carotid bruit.   Cardiovascular:      Heart sounds: Normal heart sounds, S1 normal and S2 normal. No murmur heard.     No friction rub.   Pulmonary:      Breath sounds: Decreased breath sounds (throughout) present.   Abdominal:      Palpations: There is no hepatomegaly, splenomegaly or mass.   Musculoskeletal:         General: No swelling or deformity. Normal range of motion.      Cervical back: Neck supple.      Right lower leg: No edema.      Left lower leg: No edema.   Lymphadenopathy:      Cervical: No cervical adenopathy.      Upper Body:      Right upper body: No axillary adenopathy.      Left upper body: No axillary adenopathy.      Lower Body: No right inguinal adenopathy. No left inguinal adenopathy.   Neurological:      Mental Status: She is alert.      Cranial Nerves: Cranial nerves 2-12 are intact. No cranial nerve deficit.      Sensory: No sensory deficit.      Motor: Motor function is intact. No  weakness.      Gait: Gait is intact.      Deep Tendon Reflexes: Reflexes normal.      Comments: The patient is oriented x2.   Psychiatric:         Mood and Affect: Mood normal. Mood is not anxious or depressed. Affect is not angry.         Behavior: Behavior is not agitated.         Thought Content: Thought content normal.         Judgment: Judgment normal.     LAB WORK:  Laboratory studies were reviewed.    Assessment/Plan   Problem List Items Addressed This Visit             ICD-10-CM       Cardiac and Vasculature    Hypertensive disorder I10    Hyperlipidemia E78.5       Genitourinary and Reproductive    UTI (urinary tract infection) N39.0       Neuro    Dementia (Multi) F03.90     Other Visit Diagnoses         Codes    Chronic obstructive pulmonary disease, unspecified COPD type (Multi)    -  Primary J44.9    ASHD (arteriosclerotic heart disease)     I25.10    Weakness     R53.1    At risk for falling     Z91.81        1. COPD, on bronchodilator therapy.  2. Dementia, unchanged.  3. ASHD, on aspirin.  4. Hypertension, medically controlled.  5. Weakness, on PT/OT.  6. Fall risk, on fall precautions.  7. History of UTI.  Continue to monitor.  8. Hyperlipidemia, on statin.    Scribe Attestation  By signing my name below, IEleanor Scribe attest that this documentation has been prepared under the direction and in the presence of Hermann Regan MD.     All medical record entries made by the scribe were personally dictated by me I have reviewed the chart and agree the record accurately reflects my personal performance of his history physical examination and management

## 2024-09-24 ENCOUNTER — HOSPITAL ENCOUNTER (INPATIENT)
Facility: HOSPITAL | Age: 84
LOS: 3 days | Discharge: NURSING, REHAB OR LONG-TERM CARE | End: 2024-09-29
Attending: INTERNAL MEDICINE | Admitting: INTERNAL MEDICINE
Payer: MEDICARE

## 2024-09-24 DIAGNOSIS — K92.2 GASTROINTESTINAL HEMORRHAGE, UNSPECIFIED GASTROINTESTINAL HEMORRHAGE TYPE: Primary | ICD-10-CM

## 2024-09-24 DIAGNOSIS — D64.9 ANEMIA, UNSPECIFIED TYPE: ICD-10-CM

## 2024-09-24 LAB
ABO GROUP (TYPE) IN BLOOD: NORMAL
ANION GAP SERPL CALCULATED.3IONS-SCNC: 14 MMOL/L (ref 10–20)
ANTIBODY SCREEN: NORMAL
BASOPHILS # BLD AUTO: 0.03 X10*3/UL (ref 0–0.1)
BASOPHILS NFR BLD AUTO: 0.3 %
BUN SERPL-MCNC: 23 MG/DL (ref 6–23)
CALCIUM SERPL-MCNC: 8.3 MG/DL (ref 8.6–10.3)
CHLORIDE SERPL-SCNC: 107 MMOL/L (ref 98–107)
CO2 SERPL-SCNC: 24 MMOL/L (ref 21–32)
CREAT SERPL-MCNC: 1.18 MG/DL (ref 0.5–1.05)
EGFRCR SERPLBLD CKD-EPI 2021: 45 ML/MIN/1.73M*2
EOSINOPHIL # BLD AUTO: 0 X10*3/UL (ref 0–0.4)
EOSINOPHIL NFR BLD AUTO: 0 %
ERYTHROCYTE [DISTWIDTH] IN BLOOD BY AUTOMATED COUNT: 17.2 % (ref 11.5–14.5)
GLUCOSE SERPL-MCNC: 89 MG/DL (ref 74–99)
HCT VFR BLD AUTO: 23 % (ref 36–46)
HGB BLD-MCNC: 7.1 G/DL (ref 12–16)
IMM GRANULOCYTES # BLD AUTO: 0.05 X10*3/UL (ref 0–0.5)
IMM GRANULOCYTES NFR BLD AUTO: 0.5 % (ref 0–0.9)
LYMPHOCYTES # BLD AUTO: 1.18 X10*3/UL (ref 0.8–3)
LYMPHOCYTES NFR BLD AUTO: 11.8 %
MCH RBC QN AUTO: 25.5 PG (ref 26–34)
MCHC RBC AUTO-ENTMCNC: 30.9 G/DL (ref 32–36)
MCV RBC AUTO: 83 FL (ref 80–100)
MONOCYTES # BLD AUTO: 1.39 X10*3/UL (ref 0.05–0.8)
MONOCYTES NFR BLD AUTO: 13.8 %
NEUTROPHILS # BLD AUTO: 7.39 X10*3/UL (ref 1.6–5.5)
NEUTROPHILS NFR BLD AUTO: 73.6 %
NRBC BLD-RTO: 0 /100 WBCS (ref 0–0)
PLATELET # BLD AUTO: 491 X10*3/UL (ref 150–450)
POTASSIUM SERPL-SCNC: 4.3 MMOL/L (ref 3.5–5.3)
RBC # BLD AUTO: 2.78 X10*6/UL (ref 4–5.2)
RH FACTOR (ANTIGEN D): NORMAL
SODIUM SERPL-SCNC: 141 MMOL/L (ref 136–145)
WBC # BLD AUTO: 10 X10*3/UL (ref 4.4–11.3)

## 2024-09-24 PROCEDURE — 80048 BASIC METABOLIC PNL TOTAL CA: CPT

## 2024-09-24 PROCEDURE — 36415 COLL VENOUS BLD VENIPUNCTURE: CPT

## 2024-09-24 PROCEDURE — 99285 EMERGENCY DEPT VISIT HI MDM: CPT

## 2024-09-24 PROCEDURE — 85025 COMPLETE CBC W/AUTO DIFF WBC: CPT

## 2024-09-24 PROCEDURE — 86901 BLOOD TYPING SEROLOGIC RH(D): CPT

## 2024-09-24 PROCEDURE — 86922 COMPATIBILITY TEST ANTIGLOB: CPT

## 2024-09-24 ASSESSMENT — LIFESTYLE VARIABLES
HAVE YOU EVER FELT YOU SHOULD CUT DOWN ON YOUR DRINKING: NO
EVER HAD A DRINK FIRST THING IN THE MORNING TO STEADY YOUR NERVES TO GET RID OF A HANGOVER: NO
EVER FELT BAD OR GUILTY ABOUT YOUR DRINKING: NO
TOTAL SCORE: 0
HAVE PEOPLE ANNOYED YOU BY CRITICIZING YOUR DRINKING: NO

## 2024-09-24 ASSESSMENT — PAIN - FUNCTIONAL ASSESSMENT: PAIN_FUNCTIONAL_ASSESSMENT: 0-10

## 2024-09-24 ASSESSMENT — PAIN SCALES - GENERAL: PAINLEVEL_OUTOF10: 0 - NO PAIN

## 2024-09-25 PROBLEM — K92.2 GASTROINTESTINAL HEMORRHAGE, UNSPECIFIED GASTROINTESTINAL HEMORRHAGE TYPE: Status: ACTIVE | Noted: 2024-09-25

## 2024-09-25 LAB
ABO GROUP (TYPE) IN BLOOD: NORMAL
ALBUMIN SERPL BCP-MCNC: 3.4 G/DL (ref 3.4–5)
ALP SERPL-CCNC: 188 U/L (ref 33–136)
ALT SERPL W P-5'-P-CCNC: 12 U/L (ref 7–45)
ANION GAP SERPL CALCULATED.3IONS-SCNC: 11 MMOL/L (ref 10–20)
APTT PPP: 28.3 SECONDS (ref 22–32.5)
AST SERPL W P-5'-P-CCNC: 18 U/L (ref 9–39)
BB ANTIBODY IDENTIFICATION: NORMAL
BILIRUB SERPL-MCNC: 0.3 MG/DL (ref 0–1.2)
BLOOD EXPIRATION DATE: NORMAL
BUN SERPL-MCNC: 22 MG/DL (ref 6–23)
CALCIUM SERPL-MCNC: 8.6 MG/DL (ref 8.6–10.3)
CASE #: NORMAL
CHLORIDE SERPL-SCNC: 107 MMOL/L (ref 98–107)
CO2 SERPL-SCNC: 25 MMOL/L (ref 21–32)
CREAT SERPL-MCNC: 0.98 MG/DL (ref 0.5–1.05)
DISPENSE STATUS: NORMAL
EGFRCR SERPLBLD CKD-EPI 2021: 57 ML/MIN/1.73M*2
ERYTHROCYTE [DISTWIDTH] IN BLOOD BY AUTOMATED COUNT: 17.4 % (ref 11.5–14.5)
FERRITIN SERPL-MCNC: 29 NG/ML (ref 8–150)
GLUCOSE BLD MANUAL STRIP-MCNC: 121 MG/DL (ref 74–99)
GLUCOSE SERPL-MCNC: 85 MG/DL (ref 74–99)
HCT VFR BLD AUTO: 21.5 % (ref 36–46)
HCT VFR BLD AUTO: 23.5 % (ref 36–46)
HCT VFR BLD AUTO: 24.4 % (ref 36–46)
HEMOCCULT SP1 STL QL: POSITIVE
HGB BLD-MCNC: 6.7 G/DL (ref 12–16)
HGB BLD-MCNC: 7.1 G/DL (ref 12–16)
HGB BLD-MCNC: 7.4 G/DL (ref 12–16)
INR PPP: 1 (ref 0.9–1.2)
IRON SATN MFR SERPL: 4 % (ref 25–45)
IRON SERPL-MCNC: 13 UG/DL (ref 35–150)
MCH RBC QN AUTO: 25.4 PG (ref 26–34)
MCHC RBC AUTO-ENTMCNC: 30.3 G/DL (ref 32–36)
MCV RBC AUTO: 84 FL (ref 80–100)
NRBC BLD-RTO: 0 /100 WBCS (ref 0–0)
PLATELET # BLD AUTO: 489 X10*3/UL (ref 150–450)
POTASSIUM SERPL-SCNC: 3.9 MMOL/L (ref 3.5–5.3)
PRODUCT BLOOD TYPE: 9500
PRODUCT CODE: NORMAL
PROT SERPL-MCNC: 6.7 G/DL (ref 6.4–8.2)
PROTHROMBIN TIME: 11 SECONDS (ref 9.3–12.7)
RBC # BLD AUTO: 2.91 X10*6/UL (ref 4–5.2)
RH FACTOR (ANTIGEN D): NORMAL
SODIUM SERPL-SCNC: 139 MMOL/L (ref 136–145)
TIBC SERPL-MCNC: 348 UG/DL (ref 240–445)
UIBC SERPL-MCNC: 335 UG/DL (ref 110–370)
UNIT ABO: NORMAL
UNIT NUMBER: NORMAL
UNIT RH: NORMAL
UNIT VOLUME: 350
WBC # BLD AUTO: 8.9 X10*3/UL (ref 4.4–11.3)
XM INTEP: NORMAL

## 2024-09-25 PROCEDURE — 96361 HYDRATE IV INFUSION ADD-ON: CPT

## 2024-09-25 PROCEDURE — 83550 IRON BINDING TEST: CPT

## 2024-09-25 PROCEDURE — 85027 COMPLETE CBC AUTOMATED: CPT | Performed by: INTERNAL MEDICINE

## 2024-09-25 PROCEDURE — 97110 THERAPEUTIC EXERCISES: CPT | Mod: GP

## 2024-09-25 PROCEDURE — 80053 COMPREHEN METABOLIC PANEL: CPT | Performed by: INTERNAL MEDICINE

## 2024-09-25 PROCEDURE — 99222 1ST HOSP IP/OBS MODERATE 55: CPT | Performed by: INTERNAL MEDICINE

## 2024-09-25 PROCEDURE — 2500000004 HC RX 250 GENERAL PHARMACY W/ HCPCS (ALT 636 FOR OP/ED)

## 2024-09-25 PROCEDURE — P9016 RBC LEUKOCYTES REDUCED: HCPCS

## 2024-09-25 PROCEDURE — 36430 TRANSFUSION BLD/BLD COMPNT: CPT

## 2024-09-25 PROCEDURE — G0378 HOSPITAL OBSERVATION PER HR: HCPCS

## 2024-09-25 PROCEDURE — 82270 OCCULT BLOOD FECES: CPT | Performed by: INTERNAL MEDICINE

## 2024-09-25 PROCEDURE — 2500000004 HC RX 250 GENERAL PHARMACY W/ HCPCS (ALT 636 FOR OP/ED): Performed by: INTERNAL MEDICINE

## 2024-09-25 PROCEDURE — 82947 ASSAY GLUCOSE BLOOD QUANT: CPT

## 2024-09-25 PROCEDURE — 82728 ASSAY OF FERRITIN: CPT

## 2024-09-25 PROCEDURE — 2500000005 HC RX 250 GENERAL PHARMACY W/O HCPCS: Performed by: INTERNAL MEDICINE

## 2024-09-25 PROCEDURE — 36415 COLL VENOUS BLD VENIPUNCTURE: CPT | Performed by: INTERNAL MEDICINE

## 2024-09-25 PROCEDURE — 85014 HEMATOCRIT: CPT | Performed by: INTERNAL MEDICINE

## 2024-09-25 PROCEDURE — 97161 PT EVAL LOW COMPLEX 20 MIN: CPT | Mod: GP

## 2024-09-25 PROCEDURE — 85610 PROTHROMBIN TIME: CPT | Performed by: INTERNAL MEDICINE

## 2024-09-25 PROCEDURE — 96374 THER/PROPH/DIAG INJ IV PUSH: CPT | Mod: 59

## 2024-09-25 PROCEDURE — 2500000001 HC RX 250 WO HCPCS SELF ADMINISTERED DRUGS (ALT 637 FOR MEDICARE OP): Performed by: INTERNAL MEDICINE

## 2024-09-25 RX ORDER — DILTIAZEM HYDROCHLORIDE 240 MG/1
240 CAPSULE, COATED, EXTENDED RELEASE ORAL DAILY
Status: DISCONTINUED | OUTPATIENT
Start: 2024-09-25 | End: 2024-09-29 | Stop reason: HOSPADM

## 2024-09-25 RX ORDER — ALBUTEROL SULFATE 1.25 MG/3ML
1.25 SOLUTION RESPIRATORY (INHALATION)
COMMUNITY

## 2024-09-25 RX ORDER — GUAIFENESIN 600 MG/1
600 TABLET, EXTENDED RELEASE ORAL EVERY 12 HOURS PRN
Status: DISCONTINUED | OUTPATIENT
Start: 2024-09-25 | End: 2024-09-25 | Stop reason: SDUPTHER

## 2024-09-25 RX ORDER — TALC
3 POWDER (GRAM) TOPICAL NIGHTLY
Status: DISCONTINUED | OUTPATIENT
Start: 2024-09-25 | End: 2024-09-29 | Stop reason: HOSPADM

## 2024-09-25 RX ORDER — DEXTROSE MONOHYDRATE, SODIUM CHLORIDE, AND POTASSIUM CHLORIDE 50; 1.49; 4.5 G/1000ML; G/1000ML; G/1000ML
50 INJECTION, SOLUTION INTRAVENOUS CONTINUOUS
Status: DISCONTINUED | OUTPATIENT
Start: 2024-09-25 | End: 2024-09-27

## 2024-09-25 RX ORDER — ACETAMINOPHEN 325 MG/1
650 TABLET ORAL EVERY 4 HOURS PRN
Status: DISCONTINUED | OUTPATIENT
Start: 2024-09-25 | End: 2024-09-25 | Stop reason: SDUPTHER

## 2024-09-25 RX ORDER — METOPROLOL SUCCINATE 25 MG/1
25 TABLET, EXTENDED RELEASE ORAL DAILY
COMMUNITY

## 2024-09-25 RX ORDER — ONDANSETRON 4 MG/1
4 TABLET, FILM COATED ORAL EVERY 8 HOURS PRN
Status: DISCONTINUED | OUTPATIENT
Start: 2024-09-25 | End: 2024-09-29 | Stop reason: HOSPADM

## 2024-09-25 RX ORDER — POLYETHYLENE GLYCOL 3350 17 G/17G
17 POWDER, FOR SOLUTION ORAL DAILY
Status: DISCONTINUED | OUTPATIENT
Start: 2024-09-25 | End: 2024-09-25 | Stop reason: SDUPTHER

## 2024-09-25 RX ORDER — GUAIFENESIN/DEXTROMETHORPHAN 100-10MG/5
5 SYRUP ORAL EVERY 4 HOURS PRN
Status: DISCONTINUED | OUTPATIENT
Start: 2024-09-25 | End: 2024-09-25 | Stop reason: SDUPTHER

## 2024-09-25 RX ORDER — ONDANSETRON HYDROCHLORIDE 2 MG/ML
4 INJECTION, SOLUTION INTRAVENOUS EVERY 8 HOURS PRN
Status: DISCONTINUED | OUTPATIENT
Start: 2024-09-25 | End: 2024-09-29 | Stop reason: HOSPADM

## 2024-09-25 RX ORDER — TALC
3 POWDER (GRAM) TOPICAL NIGHTLY PRN
Status: DISCONTINUED | OUTPATIENT
Start: 2024-09-25 | End: 2024-09-29 | Stop reason: HOSPADM

## 2024-09-25 RX ORDER — HYDROXYZINE PAMOATE 25 MG/1
25 CAPSULE ORAL EVERY 8 HOURS PRN
Status: DISCONTINUED | OUTPATIENT
Start: 2024-09-25 | End: 2024-09-29 | Stop reason: HOSPADM

## 2024-09-25 RX ORDER — DOCUSATE SODIUM 100 MG/1
100 CAPSULE, LIQUID FILLED ORAL 2 TIMES DAILY
Status: DISCONTINUED | OUTPATIENT
Start: 2024-09-25 | End: 2024-09-25 | Stop reason: SDUPTHER

## 2024-09-25 RX ORDER — DONEPEZIL HYDROCHLORIDE 10 MG/1
10 TABLET, FILM COATED ORAL NIGHTLY
Status: DISCONTINUED | OUTPATIENT
Start: 2024-09-25 | End: 2024-09-29 | Stop reason: HOSPADM

## 2024-09-25 RX ORDER — PANTOPRAZOLE SODIUM 40 MG/1
40 TABLET, DELAYED RELEASE ORAL
Status: DISCONTINUED | OUTPATIENT
Start: 2024-09-25 | End: 2024-09-29 | Stop reason: HOSPADM

## 2024-09-25 RX ORDER — ACETAMINOPHEN 325 MG/1
650 TABLET ORAL EVERY 4 HOURS PRN
Status: DISCONTINUED | OUTPATIENT
Start: 2024-09-25 | End: 2024-09-29 | Stop reason: HOSPADM

## 2024-09-25 RX ORDER — ONDANSETRON HYDROCHLORIDE 2 MG/ML
4 INJECTION, SOLUTION INTRAVENOUS EVERY 8 HOURS PRN
Status: DISCONTINUED | OUTPATIENT
Start: 2024-09-25 | End: 2024-09-25 | Stop reason: SDUPTHER

## 2024-09-25 RX ORDER — DOCUSATE SODIUM 100 MG/1
100 CAPSULE, LIQUID FILLED ORAL 2 TIMES DAILY
Status: DISCONTINUED | OUTPATIENT
Start: 2024-09-25 | End: 2024-09-29 | Stop reason: HOSPADM

## 2024-09-25 RX ORDER — CEPHALEXIN 500 MG/1
500 CAPSULE ORAL 3 TIMES DAILY
Status: DISCONTINUED | OUTPATIENT
Start: 2024-09-25 | End: 2024-09-29 | Stop reason: HOSPADM

## 2024-09-25 RX ORDER — ACETAMINOPHEN 650 MG/1
650 SUPPOSITORY RECTAL EVERY 4 HOURS PRN
Status: DISCONTINUED | OUTPATIENT
Start: 2024-09-25 | End: 2024-09-29 | Stop reason: HOSPADM

## 2024-09-25 RX ORDER — POLYETHYLENE GLYCOL 3350 17 G/17G
17 POWDER, FOR SOLUTION ORAL DAILY
Status: DISCONTINUED | OUTPATIENT
Start: 2024-09-25 | End: 2024-09-29 | Stop reason: HOSPADM

## 2024-09-25 RX ORDER — ATORVASTATIN CALCIUM 80 MG/1
80 TABLET, FILM COATED ORAL NIGHTLY
Status: DISCONTINUED | OUTPATIENT
Start: 2024-09-25 | End: 2024-09-29 | Stop reason: HOSPADM

## 2024-09-25 RX ORDER — ONDANSETRON 4 MG/1
4 TABLET, ORALLY DISINTEGRATING ORAL EVERY 8 HOURS PRN
Status: DISCONTINUED | OUTPATIENT
Start: 2024-09-25 | End: 2024-09-25

## 2024-09-25 RX ORDER — PANTOPRAZOLE SODIUM 40 MG/10ML
40 INJECTION, POWDER, LYOPHILIZED, FOR SOLUTION INTRAVENOUS
Status: DISCONTINUED | OUTPATIENT
Start: 2024-09-25 | End: 2024-09-29 | Stop reason: HOSPADM

## 2024-09-25 RX ORDER — ACETAMINOPHEN 160 MG/5ML
650 SOLUTION ORAL EVERY 4 HOURS PRN
Status: DISCONTINUED | OUTPATIENT
Start: 2024-09-25 | End: 2024-09-29 | Stop reason: HOSPADM

## 2024-09-25 RX ORDER — GUAIFENESIN/DEXTROMETHORPHAN 100-10MG/5
5 SYRUP ORAL EVERY 4 HOURS PRN
Status: DISCONTINUED | OUTPATIENT
Start: 2024-09-25 | End: 2024-09-29 | Stop reason: HOSPADM

## 2024-09-25 RX ORDER — GUAIFENESIN 600 MG/1
600 TABLET, EXTENDED RELEASE ORAL EVERY 12 HOURS PRN
Status: DISCONTINUED | OUTPATIENT
Start: 2024-09-25 | End: 2024-09-29 | Stop reason: HOSPADM

## 2024-09-25 RX ORDER — METOPROLOL SUCCINATE 25 MG/1
25 TABLET, EXTENDED RELEASE ORAL DAILY
Status: DISCONTINUED | OUTPATIENT
Start: 2024-09-26 | End: 2024-09-29 | Stop reason: HOSPADM

## 2024-09-25 RX ORDER — METOPROLOL TARTRATE 25 MG/1
25 TABLET, FILM COATED ORAL DAILY
Status: DISCONTINUED | OUTPATIENT
Start: 2024-09-25 | End: 2024-09-25

## 2024-09-25 RX ORDER — LOSARTAN POTASSIUM 25 MG/1
25 TABLET ORAL DAILY
Status: DISCONTINUED | OUTPATIENT
Start: 2024-09-25 | End: 2024-09-29 | Stop reason: HOSPADM

## 2024-09-25 RX ORDER — MEMANTINE HYDROCHLORIDE 5 MG/1
5 TABLET ORAL DAILY
Status: DISCONTINUED | OUTPATIENT
Start: 2024-09-25 | End: 2024-09-29 | Stop reason: HOSPADM

## 2024-09-25 RX ADMIN — CEPHALEXIN 500 MG: 500 CAPSULE ORAL at 09:17

## 2024-09-25 RX ADMIN — DEXTROSE MONOHYDRATE, SODIUM CHLORIDE, AND POTASSIUM CHLORIDE 50 ML/HR: 50; 4.5; 1.49 INJECTION, SOLUTION INTRAVENOUS at 01:18

## 2024-09-25 RX ADMIN — DILTIAZEM HYDROCHLORIDE 240 MG: 240 CAPSULE, COATED, EXTENDED RELEASE ORAL at 10:02

## 2024-09-25 RX ADMIN — CEPHALEXIN 500 MG: 500 CAPSULE ORAL at 15:04

## 2024-09-25 RX ADMIN — LOSARTAN POTASSIUM 25 MG: 50 TABLET, FILM COATED ORAL at 09:18

## 2024-09-25 RX ADMIN — METOPROLOL TARTRATE 25 MG: 25 TABLET, FILM COATED ORAL at 09:17

## 2024-09-25 RX ADMIN — ONDANSETRON HYDROCHLORIDE 4 MG: 4 TABLET, FILM COATED ORAL at 09:16

## 2024-09-25 RX ADMIN — MEMANTINE 5 MG: 5 TABLET ORAL at 10:03

## 2024-09-25 RX ADMIN — IRON SUCROSE 200 MG: 20 INJECTION, SOLUTION INTRAVENOUS at 10:03

## 2024-09-25 RX ADMIN — PANTOPRAZOLE SODIUM 40 MG: 40 TABLET, DELAYED RELEASE ORAL at 06:42

## 2024-09-25 SDOH — SOCIAL STABILITY: SOCIAL INSECURITY
WITHIN THE LAST YEAR, HAVE TO BEEN RAPED OR FORCED TO HAVE ANY KIND OF SEXUAL ACTIVITY BY YOUR PARTNER OR EX-PARTNER?: NO

## 2024-09-25 SDOH — ECONOMIC STABILITY: TRANSPORTATION INSECURITY
IN THE PAST 12 MONTHS, HAS THE LACK OF TRANSPORTATION KEPT YOU FROM MEDICAL APPOINTMENTS OR FROM GETTING MEDICATIONS?: NO

## 2024-09-25 SDOH — SOCIAL STABILITY: SOCIAL NETWORK: HOW OFTEN DO YOU ATTENT MEETINGS OF THE CLUB OR ORGANIZATION YOU BELONG TO?: NEVER

## 2024-09-25 SDOH — ECONOMIC STABILITY: FOOD INSECURITY: WITHIN THE PAST 12 MONTHS, YOU WORRIED THAT YOUR FOOD WOULD RUN OUT BEFORE YOU GOT MONEY TO BUY MORE.: NEVER TRUE

## 2024-09-25 SDOH — ECONOMIC STABILITY: HOUSING INSECURITY: IN THE PAST 12 MONTHS, HOW MANY TIMES HAVE YOU MOVED WHERE YOU WERE LIVING?: 1

## 2024-09-25 SDOH — ECONOMIC STABILITY: FOOD INSECURITY: WITHIN THE PAST 12 MONTHS, THE FOOD YOU BOUGHT JUST DIDN'T LAST AND YOU DIDN'T HAVE MONEY TO GET MORE.: NEVER TRUE

## 2024-09-25 SDOH — HEALTH STABILITY: MENTAL HEALTH
HOW OFTEN DO YOU NEED TO HAVE SOMEONE HELP YOU WHEN YOU READ INSTRUCTIONS, PAMPHLETS, OR OTHER WRITTEN MATERIAL FROM YOUR DOCTOR OR PHARMACY?: ALWAYS

## 2024-09-25 SDOH — ECONOMIC STABILITY: INCOME INSECURITY: IN THE LAST 12 MONTHS, WAS THERE A TIME WHEN YOU WERE NOT ABLE TO PAY THE MORTGAGE OR RENT ON TIME?: NO

## 2024-09-25 SDOH — SOCIAL STABILITY: SOCIAL NETWORK
DO YOU BELONG TO ANY CLUBS OR ORGANIZATIONS SUCH AS CHURCH GROUPS UNIONS, FRATERNAL OR ATHLETIC GROUPS, OR SCHOOL GROUPS?: NO

## 2024-09-25 SDOH — ECONOMIC STABILITY: HOUSING INSECURITY: AT ANY TIME IN THE PAST 12 MONTHS, WERE YOU HOMELESS OR LIVING IN A SHELTER (INCLUDING NOW)?: NO

## 2024-09-25 SDOH — HEALTH STABILITY: MENTAL HEALTH: HOW MANY STANDARD DRINKS CONTAINING ALCOHOL DO YOU HAVE ON A TYPICAL DAY?: PATIENT DOES NOT DRINK

## 2024-09-25 SDOH — SOCIAL STABILITY: SOCIAL INSECURITY
WITHIN THE LAST YEAR, HAVE YOU BEEN KICKED, HIT, SLAPPED, OR OTHERWISE PHYSICALLY HURT BY YOUR PARTNER OR EX-PARTNER?: NO

## 2024-09-25 SDOH — HEALTH STABILITY: PHYSICAL HEALTH: ON AVERAGE, HOW MANY MINUTES DO YOU ENGAGE IN EXERCISE AT THIS LEVEL?: 0 MIN

## 2024-09-25 SDOH — SOCIAL STABILITY: SOCIAL INSECURITY: WITHIN THE LAST YEAR, HAVE YOU BEEN HUMILIATED OR EMOTIONALLY ABUSED IN OTHER WAYS BY YOUR PARTNER OR EX-PARTNER?: NO

## 2024-09-25 SDOH — SOCIAL STABILITY: SOCIAL NETWORK: ARE YOU MARRIED, WIDOWED, DIVORCED, SEPARATED, NEVER MARRIED, OR LIVING WITH A PARTNER?: WIDOWED

## 2024-09-25 SDOH — HEALTH STABILITY: MENTAL HEALTH: HOW OFTEN DO YOU HAVE 6 OR MORE DRINKS ON ONE OCCASION?: NEVER

## 2024-09-25 SDOH — SOCIAL STABILITY: SOCIAL NETWORK: HOW OFTEN DO YOU GET TOGETHER WITH FRIENDS OR RELATIVES?: MORE THAN THREE TIMES A WEEK

## 2024-09-25 SDOH — ECONOMIC STABILITY: INCOME INSECURITY: IN THE PAST 12 MONTHS, HAS THE ELECTRIC, GAS, OIL, OR WATER COMPANY THREATENED TO SHUT OFF SERVICE IN YOUR HOME?: NO

## 2024-09-25 SDOH — ECONOMIC STABILITY: INCOME INSECURITY: HOW HARD IS IT FOR YOU TO PAY FOR THE VERY BASICS LIKE FOOD, HOUSING, MEDICAL CARE, AND HEATING?: NOT HARD AT ALL

## 2024-09-25 SDOH — HEALTH STABILITY: MENTAL HEALTH: HOW OFTEN DO YOU HAVE A DRINK CONTAINING ALCOHOL?: NEVER

## 2024-09-25 SDOH — ECONOMIC STABILITY: TRANSPORTATION INSECURITY
IN THE PAST 12 MONTHS, HAS LACK OF TRANSPORTATION KEPT YOU FROM MEETINGS, WORK, OR FROM GETTING THINGS NEEDED FOR DAILY LIVING?: NO

## 2024-09-25 SDOH — SOCIAL STABILITY: SOCIAL INSECURITY: WITHIN THE LAST YEAR, HAVE YOU BEEN AFRAID OF YOUR PARTNER OR EX-PARTNER?: NO

## 2024-09-25 SDOH — SOCIAL STABILITY: SOCIAL NETWORK: HOW OFTEN DO YOU ATTEND CHURCH OR RELIGIOUS SERVICES?: NEVER

## 2024-09-25 SDOH — HEALTH STABILITY: PHYSICAL HEALTH: ON AVERAGE, HOW MANY DAYS PER WEEK DO YOU ENGAGE IN MODERATE TO STRENUOUS EXERCISE (LIKE A BRISK WALK)?: 0 DAYS

## 2024-09-25 SDOH — SOCIAL STABILITY: SOCIAL NETWORK: IN A TYPICAL WEEK, HOW MANY TIMES DO YOU TALK ON THE PHONE WITH FAMILY, FRIENDS, OR NEIGHBORS?: NEVER

## 2024-09-25 ASSESSMENT — COGNITIVE AND FUNCTIONAL STATUS - GENERAL
DAILY ACTIVITIY SCORE: 19
TURNING FROM BACK TO SIDE WHILE IN FLAT BAD: A LOT
WALKING IN HOSPITAL ROOM: A LOT
MOBILITY SCORE: 13
CLIMB 3 TO 5 STEPS WITH RAILING: TOTAL
DRESSING REGULAR LOWER BODY CLOTHING: A LOT
DRESSING REGULAR UPPER BODY CLOTHING: A LITTLE
MOBILITY SCORE: 13
CLIMB 3 TO 5 STEPS WITH RAILING: TOTAL
MOVING TO AND FROM BED TO CHAIR: A LOT
HELP NEEDED FOR BATHING: A LITTLE
WALKING IN HOSPITAL ROOM: A LOT
PERSONAL GROOMING: A LITTLE
MOVING FROM LYING ON BACK TO SITTING ON SIDE OF FLAT BED WITH BEDRAILS: A LITTLE
MOVING TO AND FROM BED TO CHAIR: A LOT
EATING MEALS: A LITTLE
STANDING UP FROM CHAIR USING ARMS: A LITTLE
HELP NEEDED FOR BATHING: A LOT
WALKING IN HOSPITAL ROOM: A LOT
STANDING UP FROM CHAIR USING ARMS: A LOT
MOVING FROM LYING ON BACK TO SITTING ON SIDE OF FLAT BED WITH BEDRAILS: A LITTLE
MOVING TO AND FROM BED TO CHAIR: A LOT
PERSONAL GROOMING: A LOT
DAILY ACTIVITIY SCORE: 13
TURNING FROM BACK TO SIDE WHILE IN FLAT BAD: A LITTLE
MOBILITY SCORE: 13
MOVING FROM LYING ON BACK TO SITTING ON SIDE OF FLAT BED WITH BEDRAILS: A LITTLE
CLIMB 3 TO 5 STEPS WITH RAILING: TOTAL
TOILETING: A LITTLE
TURNING FROM BACK TO SIDE WHILE IN FLAT BAD: A LITTLE
STANDING UP FROM CHAIR USING ARMS: A LOT
DRESSING REGULAR UPPER BODY CLOTHING: A LOT
TOILETING: A LOT
DRESSING REGULAR LOWER BODY CLOTHING: A LITTLE

## 2024-09-25 ASSESSMENT — PAIN SCALES - GENERAL
PAINLEVEL_OUTOF10: 0 - NO PAIN

## 2024-09-25 ASSESSMENT — ENCOUNTER SYMPTOMS
CONSTITUTIONAL NEGATIVE: 1
PSYCHIATRIC NEGATIVE: 1
ENDOCRINE NEGATIVE: 1
ALLERGIC/IMMUNOLOGIC NEGATIVE: 1
HEMATOLOGIC/LYMPHATIC NEGATIVE: 1
CARDIOVASCULAR NEGATIVE: 1
RESPIRATORY NEGATIVE: 1
EYES NEGATIVE: 1
MUSCULOSKELETAL NEGATIVE: 1
NEUROLOGICAL NEGATIVE: 1
GASTROINTESTINAL NEGATIVE: 1

## 2024-09-25 ASSESSMENT — PAIN - FUNCTIONAL ASSESSMENT
PAIN_FUNCTIONAL_ASSESSMENT: 0-10
PAIN_FUNCTIONAL_ASSESSMENT: 0-10

## 2024-09-25 ASSESSMENT — LIFESTYLE VARIABLES
AUDIT-C TOTAL SCORE: 0
SKIP TO QUESTIONS 9-10: 1

## 2024-09-25 ASSESSMENT — ACTIVITIES OF DAILY LIVING (ADL)
LACK_OF_TRANSPORTATION: NO
ADL_ASSISTANCE: NEEDS ASSISTANCE

## 2024-09-25 NOTE — PROGRESS NOTES
09/25/24 1513   Physical Activity   On average, how many days per week do you engage in moderate to strenuous exercise (like a brisk walk)? 0 days   On average, how many minutes do you engage in exercise at this level? 0 min   Financial Resource Strain   How hard is it for you to pay for the very basics like food, housing, medical care, and heating? Not hard   Housing Stability   In the last 12 months, was there a time when you were not able to pay the mortgage or rent on time? N   In the past 12 months, how many times have you moved where you were living? 1  (pt moved to [facility] about 2 months ago)   At any time in the past 12 months, were you homeless or living in a shelter (including now)? N   Transportation Needs   In the past 12 months, has lack of transportation kept you from medical appointments or from getting medications? no   In the past 12 months, has lack of transportation kept you from meetings, work, or from getting things needed for daily living? No   Food Insecurity   Within the past 12 months, you worried that your food would run out before you got the money to buy more. Never true   Within the past 12 months, the food you bought just didn't last and you didn't have money to get more. Never true   Stress   Do you feel stress - tense, restless, nervous, or anxious, or unable to sleep at night because your mind is troubled all the time - these days? Not at all   Social Connections   In a typical week, how many times do you talk on the phone with family, friends, or neighbors? Never  (pt has Alzheimers and does not use the phone)   How often do you get together with friends or relatives? More than 3   How often do you attend Hindu or Buddhism services? Never   Do you belong to any clubs or organizations such as Hindu groups, unions, fraternal or athletic groups, or school groups? No   How often do you attend meetings of the clubs or organizations you belong to? Never   Are you ,  , , , never , or living with a partner?    Intimate Partner Violence   Within the last year, have you been afraid of your partner or ex-partner? No   Within the last year, have you been humiliated or emotionally abused in other ways by your partner or ex-partner? No   Within the last year, have you been kicked, hit, slapped, or otherwise physically hurt by your partner or ex-partner? No   Within the last year, have you been raped or forced to have any kind of sexual activity by your partner or ex-partner? No   Alcohol Use   Q1: How often do you have a drink containing alcohol? Never   Q2: How many drinks containing alcohol do you have on a typical day when you are drinking? None   Q3: How often do you have six or more drinks on one occasion? Never   Utilities   In the past 12 months has the electric, gas, oil, or water company threatened to shut off services in your home? No   Health Literacy   How often do you need to have someone help you when you read instructions, pamphlets, or other written material from your doctor or pharmacy? Always  (pt has a hx of alzheimers)

## 2024-09-25 NOTE — PROGRESS NOTES
09/25/24 1515   Discharge Planning   Living Arrangements Other (Comment)  (Pt is [facility])   Support Systems Children;Other (Comment)  (Staff at [facility])   Type of Residence Nursing home/residential care   Do you have animals or pets at home? No   Who is requesting discharge planning? Provider   Home or Post Acute Services Post acute facilities (Rehab/SNF/etc)   Type of Post Acute Facility Services Long term care   Expected Discharge Disposition Inter   Does the patient need discharge transport arranged? Yes   RoundTrip coordination needed? Yes   Has discharge transport been arranged? No   Financial Resource Strain   How hard is it for you to pay for the very basics like food, housing, medical care, and heating? Not hard   Housing Stability   In the last 12 months, was there a time when you were not able to pay the mortgage or rent on time? N   In the past 12 months, how many times have you moved where you were living? 1   At any time in the past 12 months, were you homeless or living in a shelter (including now)? N   Transportation Needs   In the past 12 months, has lack of transportation kept you from medical appointments or from getting medications? no   In the past 12 months, has lack of transportation kept you from meetings, work, or from getting things needed for daily living? No   Patient Choice   Provider Choice list and CMS website (https://medicare.gov/care-compare#search) for post-acute Quality and Resource Measure Data were provided and reviewed with: Family   Patient / Family choosing to utilize agency / facility established prior to hospitalization Yes

## 2024-09-25 NOTE — PROGRESS NOTES
Pharmacy Medication History Review    Riya Hernandez is a 83 y.o. female admitted for Gastrointestinal hemorrhage, unspecified gastrointestinal hemorrhage type. Pharmacy reviewed the patient's zisee-de-gmegutzqu medications and allergies for accuracy.    Medications ADDED:  Albuterol  Medications CHANGED:  Acetaminophen - q6h prn  Aspirin 81 mg - once daily  Cephalexin - stop date 10/1  Robitussin DM - QID prn  Lovenox 40 mg - at bedtime  Hydroxyzine 25 mg - NOT taking  Memantine - one 5 mg tablet daily  Metoprolol - takes succinate 25 mg once daily  Ondansetron IV - NOT taking  Medications REMOVED:   none     The list below reflects the updated PTA list. Comments regarding how patient may be taking medications differently can be found in the Admit Orders Activity  Prior to Admission Medications   Prescriptions Last Dose Informant   acetaminophen (Tylenol) 325 mg tablet     Sig: Take 2 tablets (650 mg) by mouth every 4 hours if needed for mild pain (1 - 3).   Patient taking differently: Take 2 tablets (650 mg) by mouth every 6 hours if needed for mild pain (1 - 3).   albuterol 1.25 mg/3 mL nebulizer solution     Sig: Take 3 mL (1.25 mg) by nebulization 3 times a day.   aspirin 81 mg chewable tablet     Sig: Chew 1 tablet (81 mg) once daily.   atorvastatin (Lipitor) 80 mg tablet     Sig: Take 1 tablet (80 mg) by mouth once daily at bedtime. For 90   benzocaine-menthol (Cepastat Sore Throat) lozenge     Sig: Dissolve 1 lozenge in the mouth every 2 hours if needed for sore throat.   cephalexin (Keflex) 500 mg capsule     Sig: Take 1 capsule (500 mg) by mouth 3 times a day.   dextromethorphan-guaifenesin (Robitussin DM)  mg/5 mL oral liquid     Sig: Take 5 mL by mouth every 4 hours if needed for cough.   Patient taking differently: Take 5 mL by mouth 4 times a day as needed for cough.   dilTIAZem CD (Cardizem CD) 240 mg 24 hr capsule     Sig: Take 1 capsule (240 mg) by mouth once daily.   docusate sodium  (Colace) 100 mg capsule     Sig: Take 1 capsule (100 mg) by mouth 2 times a day.   donepezil (Aricept) 10 mg tablet     Sig: Take 1 tablet (10 mg) by mouth once daily at bedtime.   enoxaparin (Lovenox) 40 mg/0.4 mL syringe     Sig: Inject 0.4 mL (40 mg) under the skin once every 24 hours.   Patient taking differently: Inject 0.4 mL (40 mg) under the skin once every 24 hours. Takes in evening   guaiFENesin (Mucinex) 600 mg 12 hr tablet     Sig: Take 1 tablet (600 mg) by mouth every 12 hours if needed for congestion. Do not crush, chew, or split.   hydrOXYzine pamoate (Vistaril) 25 mg capsule     Sig: Take 1 capsule (25 mg) by mouth every 8 hours if needed for itching.   losartan (Cozaar) 25 mg tablet     Sig: Take 1 tablet (25 mg) by mouth once daily. For 90   melatonin 3 mg tablet     Sig: Take 1 tablet (3 mg) by mouth as needed at bedtime for sleep.   memantine (Namenda) 5 mg tablet     Sig: Take 1 tablet (5 mg) by mouth once daily.   metoprolol succinate XL (Toprol-XL) 25 mg 24 hr tablet     Sig: Take 1 tablet (25 mg) by mouth once daily. Do not crush or chew.   ondansetron (Zofran) 4 mg/2 mL injection     Sig: Infuse 2 mL (4 mg) into a venous catheter every 8 hours if needed for nausea.   ondansetron ODT (Zofran-ODT) 4 mg disintegrating tablet     Sig: Take 1 tablet (4 mg) by mouth every 8 hours if needed for nausea.   pantoprazole (ProtoNix) 40 mg EC tablet     Sig: Take 1 tablet (40 mg) by mouth once daily in the morning. Take before meals. Do not crush, chew, or split.   polyethylene glycol (Glycolax, Miralax) 17 gram packet     Sig: Take 17 g by mouth once daily.   ticagrelor (Brilinta) 90 mg tablet     Sig: Take 1 tablet (90 mg) by mouth 2 times a day. For 90      Facility-Administered Medications: None        The list below reflects the updated allergy list. Please review each documented allergy for additional clarification and justification.  Allergies  Reviewed by Tiffanie Roth RN on 9/24/2024   No Known  Allergies         Sources used to complete the med history include facility list.    Below are additional concerns with the patient's PTA list.  Receives albuterol nebs. TID at facility, notified Dr. MASSIEL Regan.    Alisha Ruiz, PharmD  Please reach out via [employer] Secure Chat for questions

## 2024-09-25 NOTE — PROGRESS NOTES
Occupational Therapy                 Therapy Communication Note    Patient Name: Riya Hernandez  MRN: [id number]  Department: [facility] ED  Room: 02/TR02  Today's Date: 9/25/2024     Discipline: Occupational Therapy    Missed Visit Reason: Missed Visit Reason: Other (Comment) (Pt with significant drop in H&H since this AM went from 7.4/24.4 to 6.7/21.5, admit with GI bleed, no H&P available, incomplete database at this time.)    Missed Time: Cancel    Comment:

## 2024-09-25 NOTE — PROGRESS NOTES
Physical Therapy    Physical Therapy Evaluation & Treatment    Patient Name: Riya Hernandez  MRN: [id number]  Department: [facility] ED  Room: [place] [address]  Today's Date: 9/25/2024   Time Calculation  Start Time: 0745  Stop Time: 0810  Time Calculation (min): 25 min    Assessment/Plan   PT Assessment  PT Assessment Results: Decreased strength, Decreased range of motion, Decreased endurance, Impaired balance, Decreased mobility, Decreased coordination, Decreased cognition, Impaired judgement, Decreased safety awareness, Impaired hearing  Rehab Prognosis: Good  Barriers to Discharge: chronic fall risk; weakness, decreased balance,  Evaluation/Treatment Tolerance: Patient tolerated treatment well  Medical Staff Made Aware: Yes  Strengths: Support of extended family/friends, Living arrangement secure  Barriers to Participation: Comorbidities  End of Session Communication: Bedside nurse  Assessment Comment: pt required min to mod assist of 1 for the above mobility; pt demonstrates decreased strength, balance, endurance, mobility ease and safety, chronic high fall risk due to functional and cognitive deficitis. pt will benefit from continued skilled PT services while in hospital and mod int rehab post hospital d/c  End of Session Patient Position: On cart, Alarm off, not on at start of session (daughter present; nurse aware)   IP OR SWING BED PT PLAN  Inpatient or Swing Bed: Inpatient  PT Plan  Treatment/Interventions: Bed mobility, Transfer training, Gait training, Balance training, Strengthening, Endurance training, Range of motion, Therapeutic exercise, Therapeutic activity, Wheelchair management  PT Plan: Ongoing PT  PT Frequency: 4 times per week  PT Discharge Recommendations: Moderate intensity level of continued care  Equipment Recommended upon Discharge: Wheeled walker, Wheelchair  PT Recommended Transfer Status: Assist x1, Assistive device (FWW)  PT - OK to Discharge: Yes      Subjective     General Visit  "Information:  General  Reason for Referral: impaired mobility; GI Hemmorrhage  Referred By: Dr Trini Regan  Past Medical History Relevant to Rehab: Alzheimer's, MI, anxiety, ASHD with stent, COPD, HTN, OA, UTI, near syncope, DDD, falls, CKD3, left carotid stenosis  Family/Caregiver Present: Yes  Caregiver Feedback: daughter present  Prior to Session Communication: Bedside nurse  Patient Position Received: On cart, Alarm off, not on at start of session (daughter present)  Preferred Learning Style: verbal, visual  General Comment: 84yo WF admitted to observation at [facility] via ED 9/24/24 with abnormal labs, low H and H at 7.1 and 23.0. Pt is asymptomatic; Cleared by nurse for therapy; pt agreeable to therapy; + IV.  Home Living:  Home Living  Type of Home: Long Term Care facility ([facility])  Lives With: Other (Comment) (care staff)  Home Adaptive Equipment: Walker rolling or standard, Wheelchair-manual  Home Layout: One level  Home Access: Ramped entrance  Bathroom Shower/Tub: Walk-in shower  Bathroom Toilet: Handicapped height  Bathroom Equipment: Grab bars in shower, Shower chair with back  Home Living Comments: daughter reports pt has been a LTC resident at [facility] x 2 mos  Prior Level of Function:  Prior Function Per Pt/Caregiver Report  Level of [place]: Needs assistance with homemaking, Needs assistance with ADLs, Needs assistance with functional transfers  Receives Help From: Other (Comment) (care staff)  ADL Assistance: Needs assistance (bathing/dressing)  Homemaking Assistance: Needs assistance (staff manages)  Driving/Transportation:  (daughter can provide)  Ambulatory Assistance: Needs assistance (FWW vs w/c per daughter)  Vocational: Retired  Prior Function Comments: ALL INFO OBTAINED FROM DAUGHTER----pt with h/o falls; was receiving PT/OT initially at [facility] but therapies concluded due to \" not makine progress\" per daughter  Precautions:  Precautions  Hearing/Visual Limitations: reading " glasses, mild [place]  Medical Precautions: Fall precautions  Precautions Comment: + Alzheimer's dementia    Vital Signs (Past 2hrs)        Date/Time Vitals Session Patient Position Pulse Resp SpO2 BP MAP (mmHg)    09/25/24 0643 --  --  88  14  96 %  150/69  --     09/25/24 0745 Pre PT  Lying  86  --  95 %  122/82  89     09/25/24 0753 --  --  81  16  97 %  --  --                        Objective   Pain:  Pain Assessment  Pain Assessment: 0-10  0-10 (Numeric) Pain Score: 0 - No pain  Cognition:  Cognition  Overall Cognitive Status: Impaired at baseline  Orientation Level: Disoriented to place, Disoriented to time, Disoriented to situation  Memory: Exceptions to WFL  Long-Term Memory: Impaired  Short-Term Memory: Impaired  Safety/Judgement:  (decreased safety insight during functional mobility)  Insight: Moderate    General Assessments:  General Observation  General Observation: pleasant, cooperative, confused ( Alzheimer's), healing abrasion left anterior knee, + right foot drop               Activity Tolerance  Endurance: Decreased tolerance for upright activites  Activity Tolerance Comments: fair    Sensation  Light Touch: No apparent deficits    Strength  Strength Comments: dane hips 3-/5, knees 3+/5, right DF 1/5, left DF 3+/5  Coordination  Movements are Fluid and Coordinated: No  Heel to Shin: Impaired (dane LE's due to weakness, age related changes, Alzheimer's)    Postural Control  Posture Comment: mod forward head, protracted shldrs    Static Sitting Balance  Static Sitting-Balance Support: Feet unsupported, Bilateral upper extremity supported  Static Sitting-Level of Assistance: Close supervision  Static Sitting-Comment/Number of Minutes: 5 min with good balance  Dynamic Sitting Balance  Dynamic Sitting-Balance Support: Bilateral upper extremity supported, Feet unsupported  Dynamic Sitting-Level of Assistance: Contact guard  Dynamic Sitting-Comments: fair +    Static Standing Balance  Static Standing-Balance  Support: Bilateral upper extremity supported  Static Standing-Level of Assistance: Minimum assistance  Static Standing-Comment/Number of Minutes: 1-2 min---fair/fair - with FWW  Dynamic Standing Balance  Dynamic Standing-Balance Support: Bilateral upper extremity supported  Dynamic Standing-Level of Assistance: Minimum assistance, Moderate assistance  Dynamic Standing-Comments: FWW---fair -/poor +  Functional Assessments:  Bed Mobility  Bed Mobility: Yes  Bed Mobility 1  Bed Mobility 1: Supine to sitting  Level of Assistance 1: Moderate verbal cues, Moderate assistance  Bed Mobility Comments 1: head of cart slightly elevated; mod assist of 1 for trunk up, scooting pelvis, dane LE's off of cart, verbal cues for active us eof dane UE's via modified logrolling  Bed Mobility 2  Bed Mobility  2: Sitting to supine  Level of Assistance 2: Moderate assistance, Minimal verbal cues  Bed Mobility Comments 2: head of cart slightly elevated; mod assist of 1 for trunk down and dane LE's onto cart.    Transfers  Transfer: Yes  Transfer 1  Transfer From 1: Sit to  Transfer to 1: Stand  Technique 1: Sit to stand  Transfer Device 1: Walker  Transfer Level of Assistance 1: Minimum assistance, Moderate assistance, Minimal verbal cues, Minimal tactile cues  Trials/Comments 1: min to mod aassist of 1 for trunk up, verbal/tactile cues for proper dane hand placement on cart  Transfers 2  Transfer From 2: Stand to  Transfer to 2: Sit  Technique 2: Stand to sit  Transfer Device 2: Walker  Transfer Level of Assistance 2: Minimum assistance, Minimal verbal cues  Trials/Comments 2: min assist of 1 for trunk down, verbal cues for reaching back with both hands for cart    Ambulation/Gait Training  Ambulation/Gait Training Performed: Yes  Ambulation/Gait Training 1  Surface 1: Level tile  Device 1: Rolling walker  Assistance 1: Minimum assistance, Moderate assistance, Minimal verbal cues  Quality of Gait 1: Foot drop/steppage gait, Diminished heel  strike, Decreased step length, Narrow base of support, Forward flexed posture  Comments/Distance (ft) 1: pt amb 6 lateral steps to right, 6 steps forward/retro, min to mod assist of 1, FWW, verbal cues for erect posture, safe sequencing with attention to right foot drop. O2 sat 96% with HR 90 bpm    Stairs  Stairs: No  Extremity/Trunk Assessments:  Cervical Spine   Cervical Spine:  (mod forward head)  RLE   RLE :  (see above strength comments)  LLE   LLE :  (see above strength comments)  Treatments:  Therapeutic Exercise  Therapeutic Exercise Performed: Yes  Therapeutic Exercise Activity 1: supine dane AP x 20 reps ( as able right ankle/foot)  Therapeutic Exercise Activity 2: supine dane heel slides x 5 reps  Therapeutic Exercise Activity 3: seated dane LAQ's x 20 reps  Therapeutic Exercise Activity 4: seated dane marching x 15 reps  Therapeutic Exercise Activity 5: supine dane resisted PF x 4 reps  Outcome Measures:  [facility] Basic Mobility  Turning from your back to your side while in a flat bed without using bedrails: A little  Moving from lying on your back to sitting on the side of a flat bed without using bedrails: A lot  Moving to and from bed to chair (including a wheelchair): A lot  Standing up from a chair using your arms (e.g. wheelchair or bedside chair): A little  To walk in hospital room: A lot  Climbing 3-5 steps with railing: Total  Basic Mobility - Total Score: 13    Encounter Problems       Encounter Problems (Active)       Balance       STG - Maintains dynamic standing balance with upper extremity support (Progressing)       Start:  09/25/24    Expected End:  10/23/24       INTERVENTIONS:  1. Practice standing with minimal support.  2. Educate patient about standing tolerance.  3. Educate patient about independence with gait, transfers, and ADL's.  4. Educate patient about use of assistive device.  5. Educate patient about self-directed care.            Mobility       STG - Patient will ambulate 50 ft,  FWW, + turns, min assist of 1 (Progressing)       Start:  09/25/24    Expected End:  10/23/24            pt will self propel w/c 50 ft on level surfaces with 2 turns, min assist of 1 (Progressing)       Start:  09/25/24    Expected End:  10/23/24               PT Transfers       STG - Patient to transfer to and from sit to supine close supervision of 1 (Progressing)       Start:  09/25/24    Expected End:  10/23/24            STG - Patient will transfer sit to and from stand min assist of 1 (Progressing)       Start:  09/25/24    Expected End:  10/23/24               Safety       LTG - Patient will demonstrate safety requirements appropriate to situation/environment (Progressing)       Start:  09/25/24    Expected End:  10/23/24                   Education Documentation  Precautions, taught by Sharon More, PT at 9/25/2024  8:39 AM.  Learner: Patient  Readiness: Acceptance  Method: Explanation, Demonstration  Response: Needs Reinforcement    Mobility Training, taught by Sharon More, PT at 9/25/2024  8:39 AM.  Learner: Patient  Readiness: Acceptance  Method: Explanation, Demonstration  Response: Needs Reinforcement    Education Comments  No comments found.

## 2024-09-25 NOTE — ED PROVIDER NOTES
HPI   Chief Complaint   Patient presents with    Abnormal Lab     Pt being sent from [facility] for low hemoglobin       Patient is 83-year-old female presenting to the emergency department from [facility] facility for low hemoglobin.  Patient reportedly had diagnostic labs performed today and was noted to have a low hemoglobin and therefore was sent here for further evaluation.  Patient has reportedly not had any dark stools and has no complaints at this time.  She denies chest pain, shortness of breath, fevers, chills, nausea, vomiting abdominal pain.               Patient History   Past Medical History:   Diagnosis Date    Alzheimer disease (Multi)     AMI (acute myocardial infarction) (Multi)     Anxiety     Arrhythmia     ASHD (arteriosclerotic heart disease)     At risk for falling     Chest pain     COPD (chronic obstructive pulmonary disease) (Multi)     Coronary artery disease     Delirium     Delirium     Dementia (Multi)     Hyperlipidemia     Hypertension     Lightheadedness     Myocardial infarction (Multi)     Near syncope     Osteoarthritis     Rhabdomyolysis     Syncope     Urinary tract infection     Weakness      Past Surgical History:   Procedure Laterality Date    CARDIAC CATHETERIZATION      CORONARY STENT PLACEMENT       Family History   Problem Relation Name Age of Onset    Heart disease Mother      Other (triple bypass) Mother      Heart disease Brother      Sudden death Brother          cardiac death    Hypertension Other       Social History     Tobacco Use    Smoking status: Every Day     Current packs/day: 0.25     Average packs/day: 0.3 packs/day for 10.0 years (2.5 ttl pk-yrs)     Types: Cigarettes     Passive exposure: Never    Smokeless tobacco: Never   Substance Use Topics    Alcohol use: Never    Drug use: Never       Physical Exam   ED Triage Vitals [09/24/24 2224]   Temperature Heart Rate Respirations BP   37.3 °C (99.1 °F) 88 18 148/67      Pulse Ox Temp Source  Heart Rate Source Patient Position   95 % Oral -- Lying      BP Location FiO2 (%)     Right arm --       Physical Exam  Vitals and nursing note reviewed.   Constitutional:       General: She is not in acute distress.     Appearance: Normal appearance. She is not ill-appearing or toxic-appearing.   HENT:      Head: Normocephalic and atraumatic.      Mouth/Throat:      Mouth: Mucous membranes are dry.   Eyes:      Extraocular Movements: Extraocular movements intact.   Cardiovascular:      Rate and Rhythm: Normal rate.      Pulses: Normal pulses.      Heart sounds: No murmur heard.     No friction rub. No gallop.   Pulmonary:      Effort: Pulmonary effort is normal.      Breath sounds: Normal breath sounds. No wheezing, rhonchi or rales.   Abdominal:      Palpations: Abdomen is soft.      Tenderness: There is no abdominal tenderness. There is no guarding or rebound.   Musculoskeletal:         General: Normal range of motion.   Skin:     General: Skin is warm and dry.   Neurological:      General: No focal deficit present.      Mental Status: She is alert. Mental status is at baseline.   Psychiatric:         Mood and Affect: Mood normal.         Behavior: Behavior normal.           ED Course & MDM   Diagnoses as of 09/25/24 0019   Gastrointestinal hemorrhage, unspecified gastrointestinal hemorrhage type   Anemia, unspecified type                 No data recorded     [place] Coma Scale Score: 13 (09/24/24 2226 : Tiffanie Roth RN)                           Medical Decision Making  **Disclaimer parts of this chart have been completed using voice recognition software. Please excuse any errors of transcription.     Evaluated this patient independently and my supervising physician was available for consultation.    HPI: Detailed above.    Exam: A medically appropriate exam performed, outlined above, given the known history and presentation.    History obtained from: Patient and EMS    Labs/Diagnostics:  Labs Reviewed   CBC  WITH AUTO DIFFERENTIAL - Abnormal       Result Value    WBC 10.0      nRBC 0.0      RBC 2.78 (*)     Hemoglobin 7.1 (*)     Hematocrit 23.0 (*)     MCV 83      MCH 25.5 (*)     MCHC 30.9 (*)     RDW 17.2 (*)     Platelets 491 (*)     Neutrophils % 73.6      Immature Granulocytes %, Automated 0.5      Lymphocytes % 11.8      Monocytes % 13.8      Eosinophils % 0.0      Basophils % 0.3      Neutrophils Absolute 7.39 (*)     Immature Granulocytes Absolute, Automated 0.05      Lymphocytes Absolute 1.18      Monocytes Absolute 1.39 (*)     Eosinophils Absolute 0.00      Basophils Absolute 0.03     BASIC METABOLIC PANEL - Abnormal    Glucose 89      Sodium 141      Potassium 4.3      Chloride 107      Bicarbonate 24      Anion Gap 14      Urea Nitrogen 23      Creatinine 1.18 (*)     eGFR 45 (*)     Calcium 8.3 (*)    TYPE AND SCREEN    ABO TYPE O      Rh TYPE NEG      ANTIBODY SCREEN POS     BB ORDER ONLY - ANTIBODY IDENTIFICATION     EMERGENCY DEPARTMENT COURSE and DIFFERENTIAL DIAGNOSIS/MDM:  Patient is a 83-year-old female presenting to the emergency department for evaluation of low hemoglobin.  On physical exam vital signs remarkable for systolic hypertension but otherwise stable and patient is in no acute distress.  Abdomen nontender to palpation without rebound or guarding.  Diagnostic labs ordered.  Do not feel imaging is necessary at this time given that patient is not complaining of any pain.  BMP remarkable for creatinine of 1.18 and EGFR of 45 which is not that much different from baseline.  CBC showed no leukocytosis however was remarkable for an anemia.  2 months ago patient had a hemoglobin of 11.7 therefore unsure exactly why patient's hemoglobin has dropped 4 points.  She is not a candidate for blood transfusion at this time however due to concern for the low hemoglobin I consulted Dr. Regan who recommended admission for further management of possible GI bleed due to low hemoglobin and GI consult.  Patient  "admitted to regular nursing floor.    Vitals:    Vitals:    09/24/24 2224   BP: 148/67   BP Location: Right arm   Patient Position: Lying   Pulse: 88   Resp: 18   Temp: 37.3 °C (99.1 °F)   TempSrc: Oral   SpO2: 95%   Weight: 63.5 kg (140 lb)   Height: 1.651 m (5' 5\")     History Limited by:    None    Independent history obtained from:    None    External records reviewed:    None    Diagnostics interpreted by me:    None    Discussions with other clinicians:    Hospitalist/Admitting Team Dr. Regan    Chronic conditions impacting care:    None    Social determinants of health affecting care:    None    Diagnostic tests considered but not performed: None    ED Medications managed:    Medications - No data to display    Prescription drugs considered:    None    Screenings:              Procedure  Procedures     Lyudmila Pulido PA-C  09/25/24 0019    "

## 2024-09-25 NOTE — H&P
History Of Present Illness  Riya Hernandez is a 83 y.o. female presenting with abnormal labs.  Patient is currently at rehab facility where her routine blood work showed drop in H&H and patient sent for further evaluation.  Patient has dementia and a poor historian.  In the emergency room she had near syncopal episode trying to have a bowel movement.  Hemoccult was positive and repeat H&H dropping      Past medical history: Coronary artery disease s/p stent, MI, Alzheimer's dementia, osteoarthritis left hip, hypertension, high cholesterol, D&C  No smoking, no drinking no drugs  Family history: Father colon cancer mother  at 70 coronary artery disease, brother MI at age of 49 and  grandmother with dementia CABG   Past Medical History  Past Medical History:   Diagnosis Date    Alzheimer disease (Multi)     AMI (acute myocardial infarction) (Multi)     Anxiety     Arrhythmia     ASHD (arteriosclerotic heart disease)     At risk for falling     Chest pain     COPD (chronic obstructive pulmonary disease) (Multi)     Coronary artery disease     Delirium     Delirium     Dementia (Multi)     Hyperlipidemia     Hypertension     Lightheadedness     Myocardial infarction (Multi)     Near syncope     Osteoarthritis     Rhabdomyolysis     Syncope     Urinary tract infection     Weakness        Surgical History  Past Surgical History:   Procedure Laterality Date    CARDIAC CATHETERIZATION      CORONARY STENT PLACEMENT          Social History  She reports that she has been smoking cigarettes. She has a 2.5 pack-year smoking history. She has never been exposed to tobacco smoke. She has never used smokeless tobacco. She reports that she does not drink alcohol and does not use drugs.    Family History  Family History   Problem Relation Name Age of Onset    Heart disease Mother      Other (triple bypass) Mother      Heart disease Brother      Sudden death Brother          cardiac death    Hypertension Other         "  Allergies  Patient has no known allergies.    Review of Systems  Poor historian  Physical Exam  Vitals reviewed.   Constitutional:       Appearance: Normal appearance.   HENT:      Head: Normocephalic and atraumatic.      Right Ear: Tympanic membrane, ear canal and external ear normal.      Left Ear: Tympanic membrane, ear canal and external ear normal.      Nose: Nose normal.      Mouth/Throat:      Pharynx: Oropharynx is clear.   Eyes:      Extraocular Movements: Extraocular movements intact.      Conjunctiva/sclera: Conjunctivae normal.      Pupils: Pupils are equal, round, and reactive to light.   Cardiovascular:      Rate and Rhythm: Normal rate and regular rhythm.      Pulses: Normal pulses.      Heart sounds: Normal heart sounds.   Pulmonary:      Effort: Pulmonary effort is normal.      Breath sounds: Normal breath sounds.   Abdominal:      General: Abdomen is flat. Bowel sounds are normal.      Palpations: Abdomen is soft.   Musculoskeletal:      Cervical back: Normal range of motion and neck supple.   Skin:     General: Skin is warm and dry.   Neurological:      General: No focal deficit present.      Mental Status: She is alert and oriented to person, place, and time.   Psychiatric:         Mood and Affect: Mood normal.          Last Recorded Vitals  Blood pressure 118/53, pulse 51, temperature 37.3 °C (99.1 °F), temperature source Oral, resp. rate 19, height 1.651 m (5' 5\"), weight 63.5 kg (140 lb), SpO2 97%.    Relevant Results        Scheduled medications  atorvastatin, 80 mg, oral, Nightly  cephalexin, 500 mg, oral, TID  dilTIAZem CD, 240 mg, oral, Daily  docusate sodium, 100 mg, oral, BID  donepezil, 10 mg, oral, Nightly  losartan, 25 mg, oral, Daily  melatonin, 3 mg, oral, Nightly  memantine, 5 mg, oral, Daily  [START ON 9/26/2024] metoprolol succinate XL, 25 mg, oral, Daily  pantoprazole, 40 mg, oral, Daily before breakfast   Or  pantoprazole, 40 mg, intravenous, Daily before " breakfast  polyethylene glycol, 17 g, oral, Daily      Continuous medications  potassium ytmwoka-X5-3.45%NaCl, 50 mL/hr, Last Rate: 50 mL/hr (09/25/24 1151)      PRN medications  PRN medications: acetaminophen **OR** acetaminophen **OR** acetaminophen, benzocaine-menthol, dextromethorphan-guaifenesin, guaiFENesin, hydrOXYzine pamoate, melatonin, ondansetron **OR** ondansetron  Results for orders placed or performed during the hospital encounter of 09/24/24 (from the past 24 hour(s))   CBC and Auto Differential   Result Value Ref Range    WBC 10.0 4.4 - 11.3 x10*3/uL    nRBC 0.0 0.0 - 0.0 /100 WBCs    RBC 2.78 (L) 4.00 - 5.20 x10*6/uL    Hemoglobin 7.1 (L) 12.0 - 16.0 g/dL    Hematocrit 23.0 (L) 36.0 - 46.0 %    MCV 83 80 - 100 fL    MCH 25.5 (L) 26.0 - 34.0 pg    MCHC 30.9 (L) 32.0 - 36.0 g/dL    RDW 17.2 (H) 11.5 - 14.5 %    Platelets 491 (H) 150 - 450 x10*3/uL    Neutrophils % 73.6 40.0 - 80.0 %    Immature Granulocytes %, Automated 0.5 0.0 - 0.9 %    Lymphocytes % 11.8 13.0 - 44.0 %    Monocytes % 13.8 2.0 - 10.0 %    Eosinophils % 0.0 0.0 - 6.0 %    Basophils % 0.3 0.0 - 2.0 %    Neutrophils Absolute 7.39 (H) 1.60 - 5.50 x10*3/uL    Immature Granulocytes Absolute, Automated 0.05 0.00 - 0.50 x10*3/uL    Lymphocytes Absolute 1.18 0.80 - 3.00 x10*3/uL    Monocytes Absolute 1.39 (H) 0.05 - 0.80 x10*3/uL    Eosinophils Absolute 0.00 0.00 - 0.40 x10*3/uL    Basophils Absolute 0.03 0.00 - 0.10 x10*3/uL   Basic metabolic panel   Result Value Ref Range    Glucose 89 74 - 99 mg/dL    Sodium 141 136 - 145 mmol/L    Potassium 4.3 3.5 - 5.3 mmol/L    Chloride 107 98 - 107 mmol/L    Bicarbonate 24 21 - 32 mmol/L    Anion Gap 14 10 - 20 mmol/L    Urea Nitrogen 23 6 - 23 mg/dL    Creatinine 1.18 (H) 0.50 - 1.05 mg/dL    eGFR 45 (L) >60 mL/min/1.73m*2    Calcium 8.3 (L) 8.6 - 10.3 mg/dL   Type and Screen   Result Value Ref Range    ABO TYPE O     Rh TYPE NEG     ANTIBODY SCREEN POS    BB ORDER ONLY - Antibody Identification    Result Value Ref Range    Antibody ID Anti-D     CASE #     Coagulation Screen   Result Value Ref Range    Protime 11.0 9.3 - 12.7 seconds    INR 1.0 0.9 - 1.2    aPTT 28.3 22.0 - 32.5 seconds   CBC   Result Value Ref Range    WBC 8.9 4.4 - 11.3 x10*3/uL    nRBC 0.0 0.0 - 0.0 /100 WBCs    RBC 2.91 (L) 4.00 - 5.20 x10*6/uL    Hemoglobin 7.4 (L) 12.0 - 16.0 g/dL    Hematocrit 24.4 (L) 36.0 - 46.0 %    MCV 84 80 - 100 fL    MCH 25.4 (L) 26.0 - 34.0 pg    MCHC 30.3 (L) 32.0 - 36.0 g/dL    RDW 17.4 (H) 11.5 - 14.5 %    Platelets 489 (H) 150 - 450 x10*3/uL   Comprehensive metabolic panel   Result Value Ref Range    Glucose 85 74 - 99 mg/dL    Sodium 139 136 - 145 mmol/L    Potassium 3.9 3.5 - 5.3 mmol/L    Chloride 107 98 - 107 mmol/L    Bicarbonate 25 21 - 32 mmol/L    Anion Gap 11 10 - 20 mmol/L    Urea Nitrogen 22 6 - 23 mg/dL    Creatinine 0.98 0.50 - 1.05 mg/dL    eGFR 57 (L) >60 mL/min/1.73m*2    Calcium 8.6 8.6 - 10.3 mg/dL    Albumin 3.4 3.4 - 5.0 g/dL    Alkaline Phosphatase 188 (H) 33 - 136 U/L    Total Protein 6.7 6.4 - 8.2 g/dL    AST 18 9 - 39 U/L    Bilirubin, Total 0.3 0.0 - 1.2 mg/dL    ALT 12 7 - 45 U/L   Iron and TIBC   Result Value Ref Range    Iron 13 (L) 35 - 150 ug/dL    UIBC 335 110 - 370 ug/dL    TIBC 348 240 - 445 ug/dL    % Saturation 4 (L) 25 - 45 %   Ferritin   Result Value Ref Range    Ferritin 29 8 - 150 ng/mL   POCT GLUCOSE   Result Value Ref Range    POCT Glucose 121 (H) 74 - 99 mg/dL   Occult Blood, Stool    Specimen: Stool   Result Value Ref Range    Occult Blood, Stool X1 Positive (A) Negative   Hemoglobin and Hematocrit, Blood   Result Value Ref Range    Hemoglobin 6.7 (L) 12.0 - 16.0 g/dL    Hematocrit 21.5 (L) 36.0 - 46.0 %     No results found.       Assessment/Plan   Assessment & Plan  Gastrointestinal hemorrhage, unspecified gastrointestinal hemorrhage type    Dementia    Hyperlipidemia    Stage 3 chronic kidney disease, unspecified whether stage 3a or 3b CKD (Multi)    Essential  hypertension    Anemia    Syncope      Clear liquid diet  GI consult  IV Protonix  Blood transfusion  Continue home medication  Telemonitoring  Syncopal episode most likely from anemia  Hold blood pressure meds     I spent  minutes in the professional and overall care of this patient.      Trini Regan MD

## 2024-09-25 NOTE — CARE PLAN
POA/Living Will both on file  ADOD: 2 days    Pt is LTC at [facility]; hx of Alzheimers.  is her dtr Pooja Gatica [phone]  Pt needs assist with bathing and dressing. She is incontinent of urine and stool. She needs assistance with sitting and standing up from a chair. She can use a walker with stand by assist.  She wears readers, no hearing aids.  No home 02/cpap/bipap.  She smokes about 1-2 cigarettes per week.  Pt is here for low hemoglobin  The plan is for pt to return to [facility] upon discharge.      DISCHARGE PLAN: RETURN [facility]

## 2024-09-25 NOTE — PROGRESS NOTES
09/25/24 1517   [facility] Disability Status   Are you deaf or do you have serious difficulty hearing? N   Are you blind or do you have serious difficulty seeing, even when wearing glasses? N   Because of a physical, mental, or emotional condition, do you have serious difficulty concentrating, remembering, or making decisions? (5 years old or older) Y   Do you have serious difficulty walking or climbing stairs? Y   Do you have serious difficulty dressing or bathing? Y   Because of a physical, mental, or emotional condition, do you have serious difficulty doing errands alone such as visiting the doctor? Y

## 2024-09-25 NOTE — CONSULTS
Inpatient consult to gastroenterology  Consult performed by: Oxana Milan, MATHEUS-CNP  Consult ordered by: Trini Regan MD          Reason For Consult  Anemia     History Of Present Illness  Riya Hernandez is a 83 y.o. female presenting with Anemia. Patient had outpatient labs noted to have a low hemoglobin and therefore was sent here for further evaluation. Patient denies any dark tarry, bloody stools. She denies  fevers, chills, nausea, vomiting abdominal pain.  Reports no prior history of GI bleeding in the past. ER work up showed normocytic anemia with a hemoglobin of 7.1.  Normal BUN. No abdominal imaging obtained in ED. Patient is currently not on any blood thinners. She denies any regular use of alcohol, NSAID use, drug use.  No prior EGD/colonoscopy.     Past Medical History  She has a past medical history of Alzheimer disease (Multi), AMI (acute myocardial infarction) (Multi), Anxiety, Arrhythmia, ASHD (arteriosclerotic heart disease), At risk for falling, Chest pain, COPD (chronic obstructive pulmonary disease) (Multi), Coronary artery disease, Delirium, Delirium, Dementia (Multi), Hyperlipidemia, Hypertension, Lightheadedness, Myocardial infarction (Multi), Near syncope, Osteoarthritis, Rhabdomyolysis, Syncope, Urinary tract infection, and Weakness.    Surgical History  She has a past surgical history that includes Cardiac catheterization and Coronary stent placement.     Social History  She reports that she has been smoking cigarettes. She has a 2.5 pack-year smoking history. She has never been exposed to tobacco smoke. She has never used smokeless tobacco. She reports that she does not drink alcohol and does not use drugs.    Family History  Family History   Problem Relation Name Age of Onset    Heart disease Mother      Other (triple bypass) Mother      Heart disease Brother      Sudden death Brother          cardiac death    Hypertension Other          Allergies  Patient has no known  "allergies.    Review of Systems   Constitutional: Negative.    HENT: Negative.     Eyes: Negative.    Respiratory: Negative.     Cardiovascular: Negative.    Gastrointestinal: Negative.    Endocrine: Negative.    Genitourinary: Negative.    Musculoskeletal: Negative.    Skin: Negative.    Allergic/Immunologic: Negative.    Neurological: Negative.    Hematological: Negative.    Psychiatric/Behavioral: Negative.          Physical Exam  HENT:      Head: Normocephalic.      Nose: Nose normal.      Mouth/Throat:      Mouth: Mucous membranes are moist.   Eyes:      Pupils: Pupils are equal, round, and reactive to light.   Cardiovascular:      Rate and Rhythm: Normal rate.   Pulmonary:      Breath sounds: Normal breath sounds.   Abdominal:      Palpations: Abdomen is soft.   Musculoskeletal:         General: Normal range of motion.      Cervical back: Normal range of motion.   Skin:     General: Skin is warm.   Neurological:      General: No focal deficit present.      Mental Status: She is alert.   Psychiatric:         Mood and Affect: Mood normal.          Last Recorded Vitals  Blood pressure 117/70, pulse 59, temperature 37.3 °C (99.1 °F), temperature source Oral, resp. rate 10, height 1.651 m (5' 5\"), weight 63.5 kg (140 lb), SpO2 98%.    Relevant Results  No results found.     Scheduled medications  atorvastatin, 80 mg, oral, Nightly  cephalexin, 500 mg, oral, TID  dilTIAZem CD, 240 mg, oral, Daily  docusate sodium, 100 mg, oral, BID  donepezil, 10 mg, oral, Nightly  losartan, 25 mg, oral, Daily  melatonin, 3 mg, oral, Nightly  memantine, 5 mg, oral, Daily  [START ON 9/26/2024] metoprolol succinate XL, 25 mg, oral, Daily  pantoprazole, 40 mg, oral, Daily before breakfast   Or  pantoprazole, 40 mg, intravenous, Daily before breakfast  polyethylene glycol, 17 g, oral, Daily      Continuous medications  potassium aqwqahg-B7-5.45%NaCl, 50 mL/hr, Last Rate: 50 mL/hr (09/25/24 1151)      PRN medications  PRN medications: " acetaminophen **OR** acetaminophen **OR** acetaminophen, benzocaine-menthol, dextromethorphan-guaifenesin, guaiFENesin, hydrOXYzine pamoate, melatonin, ondansetron **OR** ondansetron  Results for orders placed or performed during the hospital encounter of 09/24/24 (from the past 24 hour(s))   CBC and Auto Differential   Result Value Ref Range    WBC 10.0 4.4 - 11.3 x10*3/uL    nRBC 0.0 0.0 - 0.0 /100 WBCs    RBC 2.78 (L) 4.00 - 5.20 x10*6/uL    Hemoglobin 7.1 (L) 12.0 - 16.0 g/dL    Hematocrit 23.0 (L) 36.0 - 46.0 %    MCV 83 80 - 100 fL    MCH 25.5 (L) 26.0 - 34.0 pg    MCHC 30.9 (L) 32.0 - 36.0 g/dL    RDW 17.2 (H) 11.5 - 14.5 %    Platelets 491 (H) 150 - 450 x10*3/uL    Neutrophils % 73.6 40.0 - 80.0 %    Immature Granulocytes %, Automated 0.5 0.0 - 0.9 %    Lymphocytes % 11.8 13.0 - 44.0 %    Monocytes % 13.8 2.0 - 10.0 %    Eosinophils % 0.0 0.0 - 6.0 %    Basophils % 0.3 0.0 - 2.0 %    Neutrophils Absolute 7.39 (H) 1.60 - 5.50 x10*3/uL    Immature Granulocytes Absolute, Automated 0.05 0.00 - 0.50 x10*3/uL    Lymphocytes Absolute 1.18 0.80 - 3.00 x10*3/uL    Monocytes Absolute 1.39 (H) 0.05 - 0.80 x10*3/uL    Eosinophils Absolute 0.00 0.00 - 0.40 x10*3/uL    Basophils Absolute 0.03 0.00 - 0.10 x10*3/uL   Basic metabolic panel   Result Value Ref Range    Glucose 89 74 - 99 mg/dL    Sodium 141 136 - 145 mmol/L    Potassium 4.3 3.5 - 5.3 mmol/L    Chloride 107 98 - 107 mmol/L    Bicarbonate 24 21 - 32 mmol/L    Anion Gap 14 10 - 20 mmol/L    Urea Nitrogen 23 6 - 23 mg/dL    Creatinine 1.18 (H) 0.50 - 1.05 mg/dL    eGFR 45 (L) >60 mL/min/1.73m*2    Calcium 8.3 (L) 8.6 - 10.3 mg/dL   Type and Screen   Result Value Ref Range    ABO TYPE O     Rh TYPE NEG     ANTIBODY SCREEN POS    BB ORDER ONLY - Antibody Identification   Result Value Ref Range    Antibody ID Anti-D     CASE #     Coagulation Screen   Result Value Ref Range    Protime 11.0 9.3 - 12.7 seconds    INR 1.0 0.9 - 1.2    aPTT 28.3 22.0 - 32.5 seconds   CBC    Result Value Ref Range    WBC 8.9 4.4 - 11.3 x10*3/uL    nRBC 0.0 0.0 - 0.0 /100 WBCs    RBC 2.91 (L) 4.00 - 5.20 x10*6/uL    Hemoglobin 7.4 (L) 12.0 - 16.0 g/dL    Hematocrit 24.4 (L) 36.0 - 46.0 %    MCV 84 80 - 100 fL    MCH 25.4 (L) 26.0 - 34.0 pg    MCHC 30.3 (L) 32.0 - 36.0 g/dL    RDW 17.4 (H) 11.5 - 14.5 %    Platelets 489 (H) 150 - 450 x10*3/uL   Comprehensive metabolic panel   Result Value Ref Range    Glucose 85 74 - 99 mg/dL    Sodium 139 136 - 145 mmol/L    Potassium 3.9 3.5 - 5.3 mmol/L    Chloride 107 98 - 107 mmol/L    Bicarbonate 25 21 - 32 mmol/L    Anion Gap 11 10 - 20 mmol/L    Urea Nitrogen 22 6 - 23 mg/dL    Creatinine 0.98 0.50 - 1.05 mg/dL    eGFR 57 (L) >60 mL/min/1.73m*2    Calcium 8.6 8.6 - 10.3 mg/dL    Albumin 3.4 3.4 - 5.0 g/dL    Alkaline Phosphatase 188 (H) 33 - 136 U/L    Total Protein 6.7 6.4 - 8.2 g/dL    AST 18 9 - 39 U/L    Bilirubin, Total 0.3 0.0 - 1.2 mg/dL    ALT 12 7 - 45 U/L   Iron and TIBC   Result Value Ref Range    Iron 13 (L) 35 - 150 ug/dL    UIBC 335 110 - 370 ug/dL    TIBC 348 240 - 445 ug/dL    % Saturation 4 (L) 25 - 45 %   Ferritin   Result Value Ref Range    Ferritin 29 8 - 150 ng/mL   POCT GLUCOSE   Result Value Ref Range    POCT Glucose 121 (H) 74 - 99 mg/dL   Occult Blood, Stool    Specimen: Stool   Result Value Ref Range    Occult Blood, Stool X1 Positive (A) Negative   Hemoglobin and Hematocrit, Blood   Result Value Ref Range    Hemoglobin 6.7 (L) 12.0 - 16.0 g/dL    Hematocrit 21.5 (L) 36.0 - 46.0 %        Assessment/Plan   Anemia (normocytic anemia hemoglobin hgb 7.4)  Baseline between 11 and 12, guaiac positive  Iron panel consistent with NEIL  IV Venofer ordered  Patient denies any melena/hematochezia however guaiac was positive no prior EGD.  It is reasonable to complete inpatient EGD. Will tentatively plan for EGD tomorrow  N.p.o. after midnight  - H/H stable   - Monitor  CBC, CMP, INR,  - Transfuse to keep hgb >7  - Protonix 40 mg IV BID    Oxana A  Bjorn, MATHEUS-CNP

## 2024-09-26 ENCOUNTER — APPOINTMENT (OUTPATIENT)
Dept: GASTROENTEROLOGY | Facility: HOSPITAL | Age: 84
DRG: 379 | End: 2024-09-26
Payer: MEDICARE

## 2024-09-26 ENCOUNTER — ANESTHESIA (OUTPATIENT)
Dept: GASTROENTEROLOGY | Facility: HOSPITAL | Age: 84
End: 2024-09-26
Payer: MEDICARE

## 2024-09-26 ENCOUNTER — DOCUMENTATION (OUTPATIENT)
Dept: RESEARCH | Age: 84
End: 2024-09-26

## 2024-09-26 ENCOUNTER — ANESTHESIA EVENT (OUTPATIENT)
Dept: GASTROENTEROLOGY | Facility: HOSPITAL | Age: 84
End: 2024-09-26
Payer: MEDICARE

## 2024-09-26 LAB
BLOOD EXPIRATION DATE: NORMAL
C DIF TOX TCDA+TCDB STL QL NAA+PROBE: NOT DETECTED
DISPENSE STATUS: NORMAL
FOLATE SERPL-MCNC: 13 NG/ML
HCT VFR BLD AUTO: 31.1 % (ref 36–46)
HCT VFR BLD AUTO: 31.8 % (ref 36–46)
HCT VFR BLD AUTO: 32 % (ref 36–46)
HGB BLD-MCNC: 10.1 G/DL (ref 12–16)
HGB BLD-MCNC: 10.2 G/DL (ref 12–16)
HGB BLD-MCNC: 10.3 G/DL (ref 12–16)
HOLD SPECIMEN: NORMAL
PRODUCT BLOOD TYPE: 9500
PRODUCT CODE: NORMAL
UNIT ABO: NORMAL
UNIT NUMBER: NORMAL
UNIT RH: NORMAL
UNIT VOLUME: 350
VIT B12 SERPL-MCNC: 393 PG/ML (ref 211–911)
XM INTEP: NORMAL

## 2024-09-26 PROCEDURE — 2500000001 HC RX 250 WO HCPCS SELF ADMINISTERED DRUGS (ALT 637 FOR MEDICARE OP): Performed by: INTERNAL MEDICINE

## 2024-09-26 PROCEDURE — A43235 PR ESOPHAGOGASTRODUODENOSCOPY TRANSORAL DIAGNOSTIC: Performed by: ANESTHESIOLOGIST ASSISTANT

## 2024-09-26 PROCEDURE — 3700000001 HC GENERAL ANESTHESIA TIME - INITIAL BASE CHARGE

## 2024-09-26 PROCEDURE — 43235 EGD DIAGNOSTIC BRUSH WASH: CPT | Performed by: INTERNAL MEDICINE

## 2024-09-26 PROCEDURE — 85014 HEMATOCRIT: CPT | Performed by: INTERNAL MEDICINE

## 2024-09-26 PROCEDURE — 99100 ANES PT EXTEME AGE<1 YR&>70: CPT | Performed by: STUDENT IN AN ORGANIZED HEALTH CARE EDUCATION/TRAINING PROGRAM

## 2024-09-26 PROCEDURE — 2500000004 HC RX 250 GENERAL PHARMACY W/ HCPCS (ALT 636 FOR OP/ED): Performed by: INTERNAL MEDICINE

## 2024-09-26 PROCEDURE — 3700000002 HC GENERAL ANESTHESIA TIME - EACH INCREMENTAL 1 MINUTE

## 2024-09-26 PROCEDURE — 7100000002 HC RECOVERY ROOM TIME - EACH INCREMENTAL 1 MINUTE

## 2024-09-26 PROCEDURE — 1200000002 HC GENERAL ROOM WITH TELEMETRY DAILY

## 2024-09-26 PROCEDURE — 2500000004 HC RX 250 GENERAL PHARMACY W/ HCPCS (ALT 636 FOR OP/ED): Performed by: ANESTHESIOLOGIST ASSISTANT

## 2024-09-26 PROCEDURE — 36415 COLL VENOUS BLD VENIPUNCTURE: CPT | Performed by: INTERNAL MEDICINE

## 2024-09-26 PROCEDURE — 99231 SBSQ HOSP IP/OBS SF/LOW 25: CPT | Performed by: INTERNAL MEDICINE

## 2024-09-26 PROCEDURE — 82607 VITAMIN B-12: CPT | Mod: WESLAB

## 2024-09-26 PROCEDURE — 2500000005 HC RX 250 GENERAL PHARMACY W/O HCPCS: Performed by: STUDENT IN AN ORGANIZED HEALTH CARE EDUCATION/TRAINING PROGRAM

## 2024-09-26 PROCEDURE — 0DJ08ZZ INSPECTION OF UPPER INTESTINAL TRACT, VIA NATURAL OR ARTIFICIAL OPENING ENDOSCOPIC: ICD-10-PCS

## 2024-09-26 PROCEDURE — 36430 TRANSFUSION BLD/BLD COMPNT: CPT

## 2024-09-26 PROCEDURE — A43235 PR ESOPHAGOGASTRODUODENOSCOPY TRANSORAL DIAGNOSTIC: Performed by: STUDENT IN AN ORGANIZED HEALTH CARE EDUCATION/TRAINING PROGRAM

## 2024-09-26 PROCEDURE — 2500000002 HC RX 250 W HCPCS SELF ADMINISTERED DRUGS (ALT 637 FOR MEDICARE OP, ALT 636 FOR OP/ED): Performed by: INTERNAL MEDICINE

## 2024-09-26 PROCEDURE — 82746 ASSAY OF FOLIC ACID SERUM: CPT | Mod: WESLAB

## 2024-09-26 PROCEDURE — 87493 C DIFF AMPLIFIED PROBE: CPT | Performed by: INTERNAL MEDICINE

## 2024-09-26 PROCEDURE — 36415 COLL VENOUS BLD VENIPUNCTURE: CPT

## 2024-09-26 PROCEDURE — 7100000001 HC RECOVERY ROOM TIME - INITIAL BASE CHARGE

## 2024-09-26 RX ORDER — ALBUTEROL SULFATE 0.83 MG/ML
2.5 SOLUTION RESPIRATORY (INHALATION) EVERY 30 MIN PRN
Status: DISCONTINUED | OUTPATIENT
Start: 2024-09-26 | End: 2024-09-29 | Stop reason: HOSPADM

## 2024-09-26 RX ORDER — HYDROMORPHONE HYDROCHLORIDE 0.2 MG/ML
0.2 INJECTION INTRAMUSCULAR; INTRAVENOUS; SUBCUTANEOUS EVERY 5 MIN PRN
Status: DISCONTINUED | OUTPATIENT
Start: 2024-09-26 | End: 2024-09-29 | Stop reason: HOSPADM

## 2024-09-26 RX ORDER — PROPOFOL 10 MG/ML
INJECTION, EMULSION INTRAVENOUS AS NEEDED
Status: DISCONTINUED | OUTPATIENT
Start: 2024-09-26 | End: 2024-09-26

## 2024-09-26 RX ORDER — IPRATROPIUM BROMIDE 0.5 MG/2.5ML
500 SOLUTION RESPIRATORY (INHALATION) EVERY 30 MIN PRN
Status: DISCONTINUED | OUTPATIENT
Start: 2024-09-26 | End: 2024-09-29 | Stop reason: HOSPADM

## 2024-09-26 RX ORDER — MEPERIDINE HYDROCHLORIDE 25 MG/ML
12.5 INJECTION INTRAMUSCULAR; INTRAVENOUS; SUBCUTANEOUS EVERY 10 MIN PRN
Status: DISCONTINUED | OUTPATIENT
Start: 2024-09-26 | End: 2024-09-29 | Stop reason: HOSPADM

## 2024-09-26 RX ORDER — ONDANSETRON HYDROCHLORIDE 2 MG/ML
4 INJECTION, SOLUTION INTRAVENOUS ONCE AS NEEDED
Status: DISCONTINUED | OUTPATIENT
Start: 2024-09-26 | End: 2024-09-29 | Stop reason: HOSPADM

## 2024-09-26 RX ORDER — SODIUM CHLORIDE, SODIUM LACTATE, POTASSIUM CHLORIDE, CALCIUM CHLORIDE 600; 310; 30; 20 MG/100ML; MG/100ML; MG/100ML; MG/100ML
40 INJECTION, SOLUTION INTRAVENOUS CONTINUOUS
Status: DISCONTINUED | OUTPATIENT
Start: 2024-09-26 | End: 2024-09-29 | Stop reason: HOSPADM

## 2024-09-26 RX ORDER — FENTANYL CITRATE 50 UG/ML
50 INJECTION, SOLUTION INTRAMUSCULAR; INTRAVENOUS EVERY 5 MIN PRN
Status: DISCONTINUED | OUTPATIENT
Start: 2024-09-26 | End: 2024-09-29 | Stop reason: HOSPADM

## 2024-09-26 RX ADMIN — ATORVASTATIN CALCIUM 80 MG: 80 TABLET, FILM COATED ORAL at 20:55

## 2024-09-26 RX ADMIN — CEPHALEXIN 500 MG: 500 CAPSULE ORAL at 14:39

## 2024-09-26 RX ADMIN — DOCUSATE SODIUM 100 MG: 100 CAPSULE, LIQUID FILLED ORAL at 10:09

## 2024-09-26 RX ADMIN — PANTOPRAZOLE SODIUM 40 MG: 40 INJECTION, POWDER, FOR SOLUTION INTRAVENOUS at 06:00

## 2024-09-26 RX ADMIN — Medication 3 MG: at 20:55

## 2024-09-26 RX ADMIN — DEXTROSE MONOHYDRATE, SODIUM CHLORIDE, AND POTASSIUM CHLORIDE 50 ML/HR: 50; 4.5; 1.49 INJECTION, SOLUTION INTRAVENOUS at 22:49

## 2024-09-26 RX ADMIN — GUAIFENESIN 600 MG: 600 TABLET ORAL at 10:08

## 2024-09-26 RX ADMIN — MEMANTINE 5 MG: 5 TABLET ORAL at 10:09

## 2024-09-26 RX ADMIN — POLYETHYLENE GLYCOL 3350 17 G: 17 POWDER, FOR SOLUTION ORAL at 10:08

## 2024-09-26 RX ADMIN — HYDROXYZINE PAMOATE 25 MG: 25 CAPSULE ORAL at 10:08

## 2024-09-26 RX ADMIN — DEXTROSE MONOHYDRATE, SODIUM CHLORIDE, AND POTASSIUM CHLORIDE 50 ML/HR: 50; 4.5; 1.49 INJECTION, SOLUTION INTRAVENOUS at 11:21

## 2024-09-26 RX ADMIN — DONEPEZIL HYDROCHLORIDE 10 MG: 10 TABLET, FILM COATED ORAL at 20:55

## 2024-09-26 RX ADMIN — CEPHALEXIN 500 MG: 500 CAPSULE ORAL at 10:08

## 2024-09-26 RX ADMIN — CEPHALEXIN 500 MG: 500 CAPSULE ORAL at 20:55

## 2024-09-26 RX ADMIN — DEXTROSE MONOHYDRATE, SODIUM CHLORIDE, AND POTASSIUM CHLORIDE 50 ML/HR: 50; 4.5; 1.49 INJECTION, SOLUTION INTRAVENOUS at 00:52

## 2024-09-26 RX ADMIN — METOPROLOL SUCCINATE 25 MG: 25 TABLET, EXTENDED RELEASE ORAL at 10:14

## 2024-09-26 SDOH — HEALTH STABILITY: MENTAL HEALTH: CURRENT SMOKER: 0

## 2024-09-26 ASSESSMENT — COGNITIVE AND FUNCTIONAL STATUS - GENERAL
PERSONAL GROOMING: A LOT
DAILY ACTIVITIY SCORE: 13
EATING MEALS: A LITTLE
DRESSING REGULAR LOWER BODY CLOTHING: A LOT
WALKING IN HOSPITAL ROOM: A LOT
STANDING UP FROM CHAIR USING ARMS: A LOT
MOVING TO AND FROM BED TO CHAIR: A LOT
MOBILITY SCORE: 13
MOBILITY SCORE: 13
MOVING TO AND FROM BED TO CHAIR: A LOT
TOILETING: A LOT
DRESSING REGULAR UPPER BODY CLOTHING: A LOT
CLIMB 3 TO 5 STEPS WITH RAILING: TOTAL
TURNING FROM BACK TO SIDE WHILE IN FLAT BAD: A LITTLE
MOVING FROM LYING ON BACK TO SITTING ON SIDE OF FLAT BED WITH BEDRAILS: A LITTLE
PERSONAL GROOMING: A LOT
CLIMB 3 TO 5 STEPS WITH RAILING: TOTAL
TOILETING: A LOT
TURNING FROM BACK TO SIDE WHILE IN FLAT BAD: A LITTLE
HELP NEEDED FOR BATHING: A LOT
STANDING UP FROM CHAIR USING ARMS: A LOT
DRESSING REGULAR LOWER BODY CLOTHING: A LOT
MOVING FROM LYING ON BACK TO SITTING ON SIDE OF FLAT BED WITH BEDRAILS: A LITTLE
WALKING IN HOSPITAL ROOM: A LOT
HELP NEEDED FOR BATHING: A LOT
EATING MEALS: A LITTLE

## 2024-09-26 ASSESSMENT — PAIN - FUNCTIONAL ASSESSMENT
PAIN_FUNCTIONAL_ASSESSMENT: 0-10

## 2024-09-26 ASSESSMENT — PAIN SCALES - GENERAL
PAINLEVEL_OUTOF10: 0 - NO PAIN

## 2024-09-26 NOTE — PROGRESS NOTES
"Riya Hernandez is a 83 y.o. female on day 0 of admission presenting with Gastrointestinal hemorrhage, unspecified gastrointestinal hemorrhage type.    Subjective   Patient is going for EGD       Objective     Physical Exam  Vitals reviewed.   Constitutional:       Appearance: Normal appearance.   HENT:      Head: Normocephalic and atraumatic.      Right Ear: Tympanic membrane, ear canal and external ear normal.      Left Ear: Tympanic membrane, ear canal and external ear normal.      Nose: Nose normal.      Mouth/Throat:      Pharynx: Oropharynx is clear.   Eyes:      Extraocular Movements: Extraocular movements intact.      Conjunctiva/sclera: Conjunctivae normal.      Pupils: Pupils are equal, round, and reactive to light.   Cardiovascular:      Rate and Rhythm: Normal rate and regular rhythm.      Pulses: Normal pulses.      Heart sounds: Normal heart sounds.   Pulmonary:      Effort: Pulmonary effort is normal.      Breath sounds: Normal breath sounds.   Abdominal:      General: Abdomen is flat. Bowel sounds are normal.      Palpations: Abdomen is soft.   Musculoskeletal:      Cervical back: Normal range of motion and neck supple.   Skin:     General: Skin is warm and dry.   Neurological:      General: No focal deficit present.      Mental Status: She is alert and oriented to person, place, and time.   Psychiatric:         Mood and Affect: Mood normal.         Last Recorded Vitals  Blood pressure 147/50, pulse 57, temperature 36.4 °C (97.5 °F), temperature source Axillary, resp. rate 12, height 1.651 m (5' 5\"), weight 63.5 kg (140 lb), SpO2 96%.  Intake/Output last 3 Shifts:  I/O last 3 completed shifts:  In: 1939.2 (30.5 mL/kg) [Blood:800; IV Piggyback:1139.2]  Out: 200 (3.1 mL/kg) [Urine:200 (0.1 mL/kg/hr)]  Weight: 63.5 kg     Relevant Results               Scheduled medications  atorvastatin, 80 mg, oral, Nightly  cephalexin, 500 mg, oral, TID  [Held by provider] dilTIAZem CD, 240 mg, oral, " Daily  docusate sodium, 100 mg, oral, BID  donepezil, 10 mg, oral, Nightly  [Held by provider] losartan, 25 mg, oral, Daily  melatonin, 3 mg, oral, Nightly  memantine, 5 mg, oral, Daily  metoprolol succinate XL, 25 mg, oral, Daily  oxygen, , inhalation, Continuous - 02/gases  pantoprazole, 40 mg, oral, Daily before breakfast   Or  pantoprazole, 40 mg, intravenous, Daily before breakfast  polyethylene glycol, 17 g, oral, Daily      Continuous medications  potassium lvblqxa-M8-8.45%NaCl, 50 mL/hr, Last Rate: 50 mL/hr (09/26/24 1121)  lactated Ringer's, 40 mL/hr, Last Rate: Stopped (09/26/24 1120)      PRN medications  PRN medications: acetaminophen **OR** acetaminophen **OR** acetaminophen, albuterol, benzocaine-menthol, dextromethorphan-guaifenesin, fentaNYL PF, guaiFENesin, HYDROmorphone, hydrOXYzine pamoate, ipratropium, melatonin, meperidine, ondansetron **OR** ondansetron, ondansetron, promethazine.[email or web address]         Assessment/Plan   Assessment & Plan  Gastrointestinal hemorrhage, unspecified gastrointestinal hemorrhage type    Dementia    Hyperlipidemia    Stage 3 chronic kidney disease, unspecified whether stage 3a or 3b CKD (Multi)    Essential hypertension    Anemia    Syncope    H&H stable after blood transfusion  Plan for GI workup  Blood pressure is stable  Patient has dementia       I spent  minutes in the professional and overall care of this patient.      Trini Regan MD

## 2024-09-26 NOTE — ANESTHESIA PREPROCEDURE EVALUATION
Patient: Riya Hernandez    Procedure Information       Anesthesia Start Date/Time: 09/26/24 0837    Scheduled providers: Jaswant Roberts MD; Junior Ruff DO; ISABELLE Shannon    Procedure: EGD    Location: [facility]          Past Medical History:   Diagnosis Date    Alzheimer disease (Multi)     AMI (acute myocardial infarction) (Multi)     Anxiety     Arrhythmia     ASHD (arteriosclerotic heart disease)     At risk for falling     Chest pain     COPD (chronic obstructive pulmonary disease) (Multi)     Coronary artery disease     Delirium     Delirium     Dementia (Multi)     Hyperlipidemia     Hypertension     Lightheadedness     Myocardial infarction (Multi)     Near syncope     Osteoarthritis     Rhabdomyolysis     Syncope     Urinary tract infection     Weakness      Past Surgical History:   Procedure Laterality Date    CARDIAC CATHETERIZATION      CORONARY STENT PLACEMENT     \    Relevant Problems   Anesthesia (within normal limits)      Cardiac   (+) Cardiac rhythm disorder or disturbance or change   (+) Chest pain   (+) Coronary artery disease of native artery of native heart with stable angina pectoris   (+) Essential hypertension   (+) Hyperlipidemia   (+) Hypertensive disorder      Neuro   (+) Anxiety   (+) Dementia   (+) Stenosis of left carotid artery      GI   (+) Esophageal reflux   (+) Gastrointestinal hemorrhage, unspecified gastrointestinal hemorrhage type      /Renal   (+) UTI (urinary tract infection)      Hematology   (+) Anemia      Musculoskeletal   (+) DDD (degenerative disc disease), lumbar   (+) Osteoarthritis of left hip      ID   (+) Dermatophytosis of nail   (+) UTI (urinary tract infection)       Clinical information reviewed:    Allergies  Meds               NPO Detail:  No data recorded     Physical Exam    Airway  Mallampati: II  TM distance: >3 FB  Neck ROM: full     Cardiovascular   Comments: deferred   Dental   Comments: No loose teeth    Pulmonary   Comments: deferred   Abdominal     Comments: deferred           Anesthesia Plan    History of general anesthesia?: yes  History of complications of general anesthesia?: no    ASA 3     MAC     The patient is not a current smoker.  Patient was not previously instructed to abstain from smoking on day of procedure.  Patient did not smoke on day of procedure.  Education provided regarding risk of obstructive sleep apnea.  intravenous induction   Postoperative administration of opioids is intended.  Anesthetic plan and risks discussed with patient.  Use of blood products discussed with patient who consented to blood products.    Plan discussed with CRNA and CAA.

## 2024-09-26 NOTE — NURSING NOTE
AM shift assessment unchanged, pt is A&Ox2 in no acute distress, resting in bed with call light in reach, no new events/concerns this shift. Bed alarm on

## 2024-09-26 NOTE — PROGRESS NOTES
Spiritual Care Visit    Clinical Encounter Type  Visited With: Patient and family together  Routine Visit: Introduction  Continue Visiting: Yes         Values/Beliefs  Spiritual Requests During Hospitalization: Anointed today    Sacramental Encounters  Sacrament of Sick-Anointing: Anointed     Fahad Negron

## 2024-09-26 NOTE — PROCEDURES
S/P EGD   The esophagus, stomach and duodenum appeared normal.  Ok for regular diet   Discussed possible colonoscopy with family and patient, at this time they decline.   H/H stable  GI will sign off at this time

## 2024-09-26 NOTE — CARE PLAN
The patient's goals for the shift include to go home    The clinical goals for the shift include ulcer prevention, safety      Problem: Skin  Goal: Prevent/manage excess moisture  Outcome: Progressing  Goal: Prevent/minimize sheer/friction injuries  Outcome: Progressing     Problem: Pain - Adult  Goal: Verbalizes/displays adequate comfort level or baseline comfort level  Outcome: Progressing  Flowsheets (Taken 9/26/2024 1712)  Verbalizes/displays adequate comfort level or baseline comfort level:   Encourage patient to monitor pain and request assistance   Assess pain using appropriate pain scale   Administer analgesics based on type and severity of pain and evaluate response   Implement non-pharmacological measures as appropriate and evaluate response   Consider cultural and social influences on pain and pain management   Notify Licensed Independent Practitioner if interventions unsuccessful or patient reports new pain     Problem: Safety - Adult  Goal: Free from fall injury  Outcome: Progressing  Flowsheets (Taken 9/26/2024 1712)  Free from fall injury:   Instruct family/caregiver on patient safety   Based on caregiver fall risk screen, instruct family/caregiver to ask for assistance with transferring infant if caregiver noted to have fall risk factors     Problem: Discharge Planning  Goal: Discharge to home or other facility with appropriate resources  Outcome: Progressing  Flowsheets (Taken 9/26/2024 1712)  Discharge to home or other facility with appropriate resources:   Identify barriers to discharge with patient and caregiver   Arrange for needed discharge resources and transportation as appropriate   Identify discharge learning needs (meds, wound care, etc)   Refer to discharge planning if patient needs post-hospital services based on physician order or complex needs related to functional status, cognitive ability or social support system     Problem: Chronic Conditions and Co-morbidities  Goal: Patient's  chronic conditions and co-morbidity symptoms are monitored and maintained or improved  Outcome: Progressing  Flowsheets (Taken 9/26/2024 1712)  Care Plan - Patient's Chronic Conditions and Co-Morbidity Symptoms are Monitored and Maintained or Improved:   Monitor and assess patient's chronic conditions and comorbid symptoms for stability, deterioration, or improvement   Collaborate with multidisciplinary team to address chronic and comorbid conditions and prevent exacerbation or deterioration   Update acute care plan with appropriate goals if chronic or comorbid symptoms are exacerbated and prevent overall improvement and discharge

## 2024-09-26 NOTE — CARE PLAN
The patient's goals for the shift include stabilized patient's hemoglobin    The clinical goals for the shift include no fall or injury by the end of shift    Problem: Skin  Goal: Prevent/manage excess moisture  Outcome: Progressing     Problem: Safety - Adult  Goal: Free from fall injury  Outcome: Progressing     Problem: Chronic Conditions and Co-morbidities  Goal: Patient's chronic conditions and co-morbidity symptoms are monitored and maintained or improved  Outcome: Progressing

## 2024-09-26 NOTE — PROGRESS NOTES
Occupational Therapy                 Therapy Communication Note    Patient Name: Riya Hernandez  MRN: [id number]  Department: [facility] ENDOSC1  Room: [address] [place] [address]  Today's Date: 9/26/2024     Discipline: Occupational Therapy    Missed Visit Reason: Patient in a medical procedure (Per nurse, pt is off the floor at an EGD.)    Missed Time: Attempt

## 2024-09-26 NOTE — PROGRESS NOTES
09/26/24 1100   Discharge Planning   Expected Discharge Disposition Inter  ([facility])     Patient is LTC at [facility]. There are no barriers for patient to return to [facility] when medically ready.  Plan is for an EGD today. Patient is still observation status. UM to review.

## 2024-09-26 NOTE — NURSING NOTE
Pt taken by transport to endoscopy, daughter noy called and notified and will meet patient in endoscopy for consent for procedure.

## 2024-09-26 NOTE — ANESTHESIA POSTPROCEDURE EVALUATION
Patient: Riya Hernandez    Procedure Summary       Date: 09/26/24 Room / Location: [facility]    Anesthesia Start: 0840 Anesthesia Stop: 0853    Procedure: EGD Diagnosis: Anemia, unspecified type    Scheduled Providers: Jaswant Roberts MD; Junior Ruff DO; ISABELLE Shannon Responsible Provider: Junior Ruff DO    Anesthesia Type: MAC ASA Status: 3            Anesthesia Type: MAC    Vitals Value Taken Time   /57 09/26/24 0857   Temp 36 09/26/24 0857   Pulse 59 09/26/24 0857   Resp 16 09/26/24 0857   SpO2 96 09/26/24 0857       Anesthesia Post Evaluation    Patient location during evaluation: bedside  Patient participation: complete - patient participated  Level of consciousness: awake and alert  Pain management: adequate  Multimodal analgesia pain management approach  Airway patency: patent  Two or more strategies used to mitigate risk of obstructive sleep apnea  Cardiovascular status: acceptable  Respiratory status: acceptable  Hydration status: acceptable  Postoperative Nausea and Vomiting: none        No notable events documented.

## 2024-09-27 ENCOUNTER — APPOINTMENT (OUTPATIENT)
Dept: RADIOLOGY | Facility: HOSPITAL | Age: 84
DRG: 379 | End: 2024-09-27
Payer: MEDICARE

## 2024-09-27 ENCOUNTER — APPOINTMENT (OUTPATIENT)
Dept: CARDIOLOGY | Facility: HOSPITAL | Age: 84
DRG: 379 | End: 2024-09-27
Payer: MEDICARE

## 2024-09-27 LAB
ANION GAP SERPL CALCULATED.3IONS-SCNC: 11 MMOL/L (ref 10–20)
BUN SERPL-MCNC: 14 MG/DL (ref 6–23)
CALCIUM SERPL-MCNC: 8 MG/DL (ref 8.6–10.3)
CHLORIDE SERPL-SCNC: 108 MMOL/L (ref 98–107)
CO2 SERPL-SCNC: 24 MMOL/L (ref 21–32)
CREAT SERPL-MCNC: 0.95 MG/DL (ref 0.5–1.05)
EGFRCR SERPLBLD CKD-EPI 2021: 59 ML/MIN/1.73M*2
ERYTHROCYTE [DISTWIDTH] IN BLOOD BY AUTOMATED COUNT: 16.8 % (ref 11.5–14.5)
GLUCOSE SERPL-MCNC: 90 MG/DL (ref 74–99)
HCT VFR BLD AUTO: 32 % (ref 36–46)
HCT VFR BLD AUTO: 32.6 % (ref 36–46)
HCT VFR BLD AUTO: 33.6 % (ref 36–46)
HGB BLD-MCNC: 10.2 G/DL (ref 12–16)
HGB BLD-MCNC: 10.4 G/DL (ref 12–16)
HGB BLD-MCNC: 10.4 G/DL (ref 12–16)
MCH RBC QN AUTO: 26.3 PG (ref 26–34)
MCHC RBC AUTO-ENTMCNC: 31 G/DL (ref 32–36)
MCV RBC AUTO: 85 FL (ref 80–100)
NRBC BLD-RTO: 0.3 /100 WBCS (ref 0–0)
PLATELET # BLD AUTO: 387 X10*3/UL (ref 150–450)
POTASSIUM SERPL-SCNC: 4.1 MMOL/L (ref 3.5–5.3)
RBC # BLD AUTO: 3.95 X10*6/UL (ref 4–5.2)
SODIUM SERPL-SCNC: 139 MMOL/L (ref 136–145)
WBC # BLD AUTO: 7.3 X10*3/UL (ref 4.4–11.3)

## 2024-09-27 PROCEDURE — 99233 SBSQ HOSP IP/OBS HIGH 50: CPT | Performed by: INTERNAL MEDICINE

## 2024-09-27 PROCEDURE — 85014 HEMATOCRIT: CPT | Performed by: INTERNAL MEDICINE

## 2024-09-27 PROCEDURE — 36415 COLL VENOUS BLD VENIPUNCTURE: CPT | Performed by: INTERNAL MEDICINE

## 2024-09-27 PROCEDURE — 2500000001 HC RX 250 WO HCPCS SELF ADMINISTERED DRUGS (ALT 637 FOR MEDICARE OP): Performed by: INTERNAL MEDICINE

## 2024-09-27 PROCEDURE — 71046 X-RAY EXAM CHEST 2 VIEWS: CPT

## 2024-09-27 PROCEDURE — 2500000005 HC RX 250 GENERAL PHARMACY W/O HCPCS: Performed by: STUDENT IN AN ORGANIZED HEALTH CARE EDUCATION/TRAINING PROGRAM

## 2024-09-27 PROCEDURE — 97167 OT EVAL HIGH COMPLEX 60 MIN: CPT | Mod: GO

## 2024-09-27 PROCEDURE — 93005 ELECTROCARDIOGRAM TRACING: CPT

## 2024-09-27 PROCEDURE — 2500000004 HC RX 250 GENERAL PHARMACY W/ HCPCS (ALT 636 FOR OP/ED): Performed by: INTERNAL MEDICINE

## 2024-09-27 PROCEDURE — 85027 COMPLETE CBC AUTOMATED: CPT | Performed by: INTERNAL MEDICINE

## 2024-09-27 PROCEDURE — 82374 ASSAY BLOOD CARBON DIOXIDE: CPT | Performed by: INTERNAL MEDICINE

## 2024-09-27 PROCEDURE — 1200000002 HC GENERAL ROOM WITH TELEMETRY DAILY

## 2024-09-27 PROCEDURE — 71046 X-RAY EXAM CHEST 2 VIEWS: CPT | Performed by: RADIOLOGY

## 2024-09-27 RX ORDER — FUROSEMIDE 10 MG/ML
20 INJECTION INTRAMUSCULAR; INTRAVENOUS ONCE
Status: COMPLETED | OUTPATIENT
Start: 2024-09-27 | End: 2024-09-27

## 2024-09-27 RX ADMIN — DONEPEZIL HYDROCHLORIDE 10 MG: 10 TABLET, FILM COATED ORAL at 21:04

## 2024-09-27 RX ADMIN — CEPHALEXIN 500 MG: 500 CAPSULE ORAL at 21:04

## 2024-09-27 RX ADMIN — PANTOPRAZOLE SODIUM 40 MG: 40 TABLET, DELAYED RELEASE ORAL at 06:21

## 2024-09-27 RX ADMIN — DEXTROSE MONOHYDRATE, SODIUM CHLORIDE, AND POTASSIUM CHLORIDE 50 ML/HR: 50; 4.5; 1.49 INJECTION, SOLUTION INTRAVENOUS at 19:50

## 2024-09-27 RX ADMIN — FUROSEMIDE 20 MG: 10 INJECTION, SOLUTION INTRAMUSCULAR; INTRAVENOUS at 21:04

## 2024-09-27 RX ADMIN — Medication 3 MG: at 21:04

## 2024-09-27 RX ADMIN — CEPHALEXIN 500 MG: 500 CAPSULE ORAL at 08:41

## 2024-09-27 RX ADMIN — METOPROLOL SUCCINATE 25 MG: 25 TABLET, EXTENDED RELEASE ORAL at 08:41

## 2024-09-27 RX ADMIN — CEPHALEXIN 500 MG: 500 CAPSULE ORAL at 15:32

## 2024-09-27 RX ADMIN — MEMANTINE 5 MG: 5 TABLET ORAL at 08:41

## 2024-09-27 RX ADMIN — Medication 2 L/MIN: at 08:41

## 2024-09-27 RX ADMIN — ATORVASTATIN CALCIUM 80 MG: 80 TABLET, FILM COATED ORAL at 21:04

## 2024-09-27 ASSESSMENT — COGNITIVE AND FUNCTIONAL STATUS - GENERAL
MOVING TO AND FROM BED TO CHAIR: A LITTLE
MOBILITY SCORE: 13
DRESSING REGULAR UPPER BODY CLOTHING: A LOT
EATING MEALS: A LITTLE
STANDING UP FROM CHAIR USING ARMS: A LOT
HELP NEEDED FOR BATHING: A LITTLE
WALKING IN HOSPITAL ROOM: A LOT
HELP NEEDED FOR BATHING: A LITTLE
HELP NEEDED FOR BATHING: A LOT
TOILETING: TOTAL
PERSONAL GROOMING: A LOT
DAILY ACTIVITIY SCORE: 11
DAILY ACTIVITIY SCORE: 17
STANDING UP FROM CHAIR USING ARMS: A LITTLE
PERSONAL GROOMING: A LITTLE
DRESSING REGULAR LOWER BODY CLOTHING: A LITTLE
TOILETING: TOTAL
PERSONAL GROOMING: A LITTLE
CLIMB 3 TO 5 STEPS WITH RAILING: TOTAL
MOBILITY SCORE: 18
TURNING FROM BACK TO SIDE WHILE IN FLAT BAD: A LITTLE
EATING MEALS: A LITTLE
DAILY ACTIVITIY SCORE: 14
MOVING TO AND FROM BED TO CHAIR: A LOT
TOILETING: A LOT
CLIMB 3 TO 5 STEPS WITH RAILING: A LOT
DRESSING REGULAR LOWER BODY CLOTHING: A LITTLE
DRESSING REGULAR LOWER BODY CLOTHING: TOTAL
DRESSING REGULAR UPPER BODY CLOTHING: A LOT
WALKING IN HOSPITAL ROOM: A LOT
MOVING FROM LYING ON BACK TO SITTING ON SIDE OF FLAT BED WITH BEDRAILS: A LITTLE
DRESSING REGULAR UPPER BODY CLOTHING: TOTAL

## 2024-09-27 ASSESSMENT — PAIN SCALES - PAIN ASSESSMENT IN ADVANCED DEMENTIA (PAINAD)
FACIALEXPRESSION: SMILING OR INEXPRESSIVE
TOTALSCORE: 0
CONSOLABILITY: NO NEED TO CONSOLE
BODYLANGUAGE: RELAXED
BREATHING: NORMAL
TOTALSCORE: REPOSITIONED

## 2024-09-27 ASSESSMENT — PAIN - FUNCTIONAL ASSESSMENT
PAIN_FUNCTIONAL_ASSESSMENT: PAINAD (PAIN ASSESSMENT IN ADVANCED DEMENTIA SCALE)
PAIN_FUNCTIONAL_ASSESSMENT: 0-10
PAIN_FUNCTIONAL_ASSESSMENT: 0-10

## 2024-09-27 ASSESSMENT — ACTIVITIES OF DAILY LIVING (ADL)
ADL_ASSISTANCE: NEEDS ASSISTANCE
BATHING_ASSISTANCE: NOT PERFORMED

## 2024-09-27 ASSESSMENT — PAIN SCALES - GENERAL
PAINLEVEL_OUTOF10: 0 - NO PAIN

## 2024-09-27 NOTE — PROGRESS NOTES
Physical Therapy                 Therapy Communication Note    Patient Name: Riya Hernandez  MRN: [id number]  Department: [address]  Room: [facility]/408-B  Today's Date: 9/27/2024     Discipline: Physical Therapy    Missed Visit Reason: Missed Visit Reason: Other (Comment) (pt is functioning at her baseline. pt is a LTC resident who requires assistance for all ADLs and mobility. No need for further acute skilled PT services.)    Comment: Will discontinue PT services.

## 2024-09-27 NOTE — PROGRESS NOTES
Occupational Therapy    Evaluation    Patient Name: Riya Hernandez  MRN: [id number]  Department: [address]  Room: [address] [place] [address]  Today's Date: 9/27/2024  Time Calculation  Start Time: 1320  Stop Time: 1332  Time Calculation (min): 12 min        Assessment:  OT Assessment: Based on pt's PLOF and significant cognitive deficits at baseline, it is determined on eval that she is not appropriate for continued skilled OT.  Prognosis: Poor  Evaluation/Treatment Tolerance: Patient tolerated treatment well  End of Session Communication: Bedside nurse  End of Session Patient Position: Bed, 3 rail up, Alarm on (Needs in reach and pt put into a chair position in bed. Family at bedside.)    Plan:  No Skilled OT: No acute OT goals identified  OT Frequency: OT eval only  OT Discharge Recommendations: No further acute OT, 24 hr supervision due to cognition, Other (Comment) (Return to her LTC facility with 24 hour supervision/assist available.)  OT - OK to Discharge: Yes       Subjective     General:  General  Reason for Referral: impaired ADL's, cognitive deficits  Referred By: Trini Regan MD  Past Medical History Relevant to Rehab: Alzheimer's, MI, anxiety, ASHD with stent, COPD, HTN, OA, UTI, near syncope, DDD, falls, CKD3, left carotid stenosis  Family/Caregiver Present: Yes  Caregiver Feedback: daughter present  Prior to Session Communication: Bedside nurse  Patient Position Received: Bed, 3 rail up, Alarm on  General Comment: 82yo female admitted to [facility] on 9/24/24 with abnormal labs, low H and H at 7.1 and 23.0. Pt is asymptomatic; Cleared by nurse for therapy; pt agreeable to therapy.    Precautions:  Hearing/Visual Limitations: reading glasses, mild [place]  Medical Precautions: Fall precautions  Precautions Comment: h/o Alzheimer's dementia    Pain:  Pain Assessment  Pain Assessment: 0-10  0-10 (Numeric) Pain Score: 0 - No pain    Objective     Cognition:  Overall Cognitive Status: Impaired at baseline  Orientation Level:  "Disoriented to situation, Disoriented to time, Disoriented to place  Following Commands: Follows one step commands consistently  Cognition Comments: Pt has a h/o Alzheimer's dementia and demonstrates no evidence of learning upon eval.  Long-Term Memory: Impaired  Short-Term Memory: Impaired  Insight: Severe    Home Living:  Type of Home: Long Term Care facility ([facility])  Lives With: Other (Comment) (care staff)  Home Adaptive Equipment: Walker rolling or standard, Wheelchair-manual  Home Layout: One level  Home Access: Ramped entrance  Bathroom Shower/Tub: Walk-in shower  Bathroom Toilet: Handicapped height  Bathroom Equipment: Grab bars in shower, Shower chair with back  Home Living Comments: daughter reports pt has been a LTC resident at [facility] x 2 mos    Prior Function:  Level of [place]: Needs assistance with homemaking, Needs assistance with ADLs, Needs assistance with functional transfers  Receives Help From: Other (Comment) (care staff)  ADL Assistance: Needs assistance (assist for all except feeding per dtr.)  Homemaking Assistance: Needs assistance  Ambulatory Assistance: Needs assistance (RW vs W/C)  Vocational: Retired  Hand Dominance: Right  Prior Function Comments: Skilled PT/OT discontinued at her LTC facility due to pt \"not making progress.\"    ADL:  Eating Assistance: Stand by  Eating Deficit: Setup  Grooming Assistance: Not performed  Bathing Assistance: Not performed  UE Dressing Assistance: Not performed  LE Dressing Assistance: Not performed  Toileting Assistance with Device: Not performed  ADL Comments: Pt has increased assist at baseline at her LTC facility.    Bed Mobility/Transfers: Bed Mobility  Bed Mobility:  (Pt required mod A to reposition in bed and pt has assist available at her LTC facility for this.)    Sensation:  Sensation Comment: Unable to accurately assess d/t poor cognition.    Strength:  Strength Comments: Suraj WFL    Coordination:  Coordination Comment: DUANE's " WFL     Hand Function:  Gross Grasp: Functional  Coordination: Functional      Outcome Measures:[facility] Daily Activity  Putting on and taking off regular lower body clothing: Total  Bathing (including washing, rinsing, drying): A lot  Putting on and taking off regular upper body clothing: A lot  Toileting, which includes using toilet, bedpan or urinal: Total  Taking care of personal grooming such as brushing teeth: A lot  Eating Meals: A little  Daily Activity - Total Score: 11      Education Documentation  No documentation found.  Education Comments  No comments found.

## 2024-09-27 NOTE — PROGRESS NOTES
09/27/24 1743   Discharge Planning   Expected Discharge Disposition Inter     Patient is LTC at [facility].  There are no barriers for patient to return to [facility] when medically ready for discharge.     PLEASE NOTIFY CARE COORDINATION PRIOR TO DISCHARGE

## 2024-09-27 NOTE — CARE PLAN
The patient's goals for the shift include to go home    The clinical goals for the shift include ulcer prevention, safety    Over the shift, the patient did not make progress toward the following goals. Barriers to progression include . Recommendations to address these barriers include   Problem: Skin  Goal: Prevent/manage excess moisture  Outcome: Progressing  Goal: Prevent/minimize sheer/friction injuries  Outcome: Progressing     Problem: Pain - Adult  Goal: Verbalizes/displays adequate comfort level or baseline comfort level  Outcome: Progressing     Problem: Safety - Adult  Goal: Free from fall injury  Outcome: Progressing   .

## 2024-09-27 NOTE — CARE PLAN
The patient's goals for the shift include to go home    The clinical goals for the shift include Patient will have no signs of bleeding by end of shift 09/27/24 @ 0730.    Over the shift, the patient did not make progress toward the following goals. Barriers to progression include   Problem: Skin  Goal: Prevent/manage excess moisture  9/26/2024 2222 by Pam Foster RN  Outcome: Progressing  9/26/2024 2218 by Pam Foster RN  Outcome: Progressing  Goal: Prevent/minimize sheer/friction injuries  9/26/2024 2222 by Pam Foster RN  Outcome: Progressing  9/26/2024 2218 by Pam Foster RN  Outcome: Progressing     Problem: Pain - Adult  Goal: Verbalizes/displays adequate comfort level or baseline comfort level  9/26/2024 2222 by Pam Foster RN  Outcome: Progressing  9/26/2024 2218 by Pam Foster RN  Outcome: Progressing     Problem: Safety - Adult  Goal: Free from fall injury  9/26/2024 2222 by Pam Foster RN  Outcome: Progressing  9/26/2024 2218 by Pam Foster RN  Outcome: Progressing   . Recommendations to address these barriers include .

## 2024-09-28 LAB
ANION GAP SERPL CALCULATED.3IONS-SCNC: 12 MMOL/L (ref 10–20)
BUN SERPL-MCNC: 21 MG/DL (ref 6–23)
CALCIUM SERPL-MCNC: 8.5 MG/DL (ref 8.6–10.3)
CHLORIDE SERPL-SCNC: 106 MMOL/L (ref 98–107)
CO2 SERPL-SCNC: 25 MMOL/L (ref 21–32)
CREAT SERPL-MCNC: 1.02 MG/DL (ref 0.5–1.05)
EGFRCR SERPLBLD CKD-EPI 2021: 54 ML/MIN/1.73M*2
ERYTHROCYTE [DISTWIDTH] IN BLOOD BY AUTOMATED COUNT: 16.8 % (ref 11.5–14.5)
GLUCOSE SERPL-MCNC: 83 MG/DL (ref 74–99)
HCT VFR BLD AUTO: 36.1 % (ref 36–46)
HGB BLD-MCNC: 11.3 G/DL (ref 12–16)
MCH RBC QN AUTO: 26.4 PG (ref 26–34)
MCHC RBC AUTO-ENTMCNC: 31.3 G/DL (ref 32–36)
MCV RBC AUTO: 84 FL (ref 80–100)
NRBC BLD-RTO: 0.2 /100 WBCS (ref 0–0)
PLATELET # BLD AUTO: 436 X10*3/UL (ref 150–450)
POTASSIUM SERPL-SCNC: 3.9 MMOL/L (ref 3.5–5.3)
RBC # BLD AUTO: 4.28 X10*6/UL (ref 4–5.2)
SODIUM SERPL-SCNC: 139 MMOL/L (ref 136–145)
WBC # BLD AUTO: 8.8 X10*3/UL (ref 4.4–11.3)

## 2024-09-28 PROCEDURE — 85027 COMPLETE CBC AUTOMATED: CPT | Performed by: INTERNAL MEDICINE

## 2024-09-28 PROCEDURE — 1200000002 HC GENERAL ROOM WITH TELEMETRY DAILY

## 2024-09-28 PROCEDURE — 99232 SBSQ HOSP IP/OBS MODERATE 35: CPT | Performed by: INTERNAL MEDICINE

## 2024-09-28 PROCEDURE — 2500000005 HC RX 250 GENERAL PHARMACY W/O HCPCS: Performed by: STUDENT IN AN ORGANIZED HEALTH CARE EDUCATION/TRAINING PROGRAM

## 2024-09-28 PROCEDURE — 2500000004 HC RX 250 GENERAL PHARMACY W/ HCPCS (ALT 636 FOR OP/ED): Performed by: INTERNAL MEDICINE

## 2024-09-28 PROCEDURE — 36415 COLL VENOUS BLD VENIPUNCTURE: CPT | Performed by: INTERNAL MEDICINE

## 2024-09-28 PROCEDURE — 2500000001 HC RX 250 WO HCPCS SELF ADMINISTERED DRUGS (ALT 637 FOR MEDICARE OP): Performed by: INTERNAL MEDICINE

## 2024-09-28 PROCEDURE — 80048 BASIC METABOLIC PNL TOTAL CA: CPT | Performed by: INTERNAL MEDICINE

## 2024-09-28 RX ADMIN — Medication 21 PERCENT: at 07:44

## 2024-09-28 RX ADMIN — DONEPEZIL HYDROCHLORIDE 10 MG: 10 TABLET, FILM COATED ORAL at 20:57

## 2024-09-28 RX ADMIN — ATORVASTATIN CALCIUM 80 MG: 80 TABLET, FILM COATED ORAL at 20:54

## 2024-09-28 RX ADMIN — CEPHALEXIN 500 MG: 500 CAPSULE ORAL at 08:29

## 2024-09-28 RX ADMIN — CEPHALEXIN 500 MG: 500 CAPSULE ORAL at 20:54

## 2024-09-28 RX ADMIN — CEPHALEXIN 500 MG: 500 CAPSULE ORAL at 15:21

## 2024-09-28 RX ADMIN — PANTOPRAZOLE SODIUM 40 MG: 40 TABLET, DELAYED RELEASE ORAL at 06:21

## 2024-09-28 RX ADMIN — Medication 3 MG: at 20:54

## 2024-09-28 RX ADMIN — POLYETHYLENE GLYCOL 3350 17 G: 17 POWDER, FOR SOLUTION ORAL at 08:29

## 2024-09-28 RX ADMIN — GUAIFENESIN AND DEXTROMETHORPHAN 5 ML: 100; 10 SYRUP ORAL at 02:05

## 2024-09-28 RX ADMIN — METOPROLOL SUCCINATE 25 MG: 25 TABLET, EXTENDED RELEASE ORAL at 08:29

## 2024-09-28 RX ADMIN — DOCUSATE SODIUM 100 MG: 100 CAPSULE, LIQUID FILLED ORAL at 08:29

## 2024-09-28 RX ADMIN — MEMANTINE 5 MG: 5 TABLET ORAL at 08:29

## 2024-09-28 SDOH — SOCIAL STABILITY: SOCIAL INSECURITY: WERE YOU ABLE TO COMPLETE ALL THE BEHAVIORAL HEALTH SCREENINGS?: NO

## 2024-09-28 SDOH — SOCIAL STABILITY: SOCIAL INSECURITY: HAVE YOU HAD THOUGHTS OF HARMING ANYONE ELSE?: NO

## 2024-09-28 SDOH — SOCIAL STABILITY: SOCIAL INSECURITY: ARE THERE ANY APPARENT SIGNS OF INJURIES/BEHAVIORS THAT COULD BE RELATED TO ABUSE/NEGLECT?: NO

## 2024-09-28 SDOH — SOCIAL STABILITY: SOCIAL INSECURITY: DO YOU FEEL UNSAFE GOING BACK TO THE PLACE WHERE YOU ARE LIVING?: NO

## 2024-09-28 SDOH — SOCIAL STABILITY: SOCIAL INSECURITY: HAS ANYONE EVER THREATENED TO HURT YOUR FAMILY OR YOUR PETS?: NO

## 2024-09-28 SDOH — SOCIAL STABILITY: SOCIAL INSECURITY: DOES ANYONE TRY TO KEEP YOU FROM HAVING/CONTACTING OTHER FRIENDS OR DOING THINGS OUTSIDE YOUR HOME?: NO

## 2024-09-28 SDOH — SOCIAL STABILITY: SOCIAL INSECURITY: ABUSE: ADULT

## 2024-09-28 SDOH — SOCIAL STABILITY: SOCIAL INSECURITY: DO YOU FEEL ANYONE HAS EXPLOITED OR TAKEN ADVANTAGE OF YOU FINANCIALLY OR OF YOUR PERSONAL PROPERTY?: NO

## 2024-09-28 SDOH — SOCIAL STABILITY: SOCIAL INSECURITY: ARE YOU OR HAVE YOU BEEN THREATENED OR ABUSED PHYSICALLY, EMOTIONALLY, OR SEXUALLY BY ANYONE?: NO

## 2024-09-28 SDOH — SOCIAL STABILITY: SOCIAL INSECURITY: HAVE YOU HAD ANY THOUGHTS OF HARMING ANYONE ELSE?: NO

## 2024-09-28 ASSESSMENT — COGNITIVE AND FUNCTIONAL STATUS - GENERAL
MOBILITY SCORE: 14
MOBILITY SCORE: 11
PERSONAL GROOMING: A LOT
MOVING TO AND FROM BED TO CHAIR: A LOT
PATIENT BASELINE BEDBOUND: NO
TOILETING: A LOT
STANDING UP FROM CHAIR USING ARMS: A LOT
CLIMB 3 TO 5 STEPS WITH RAILING: TOTAL
DRESSING REGULAR UPPER BODY CLOTHING: A LITTLE
CLIMB 3 TO 5 STEPS WITH RAILING: TOTAL
DRESSING REGULAR LOWER BODY CLOTHING: A LITTLE
WALKING IN HOSPITAL ROOM: A LOT
DRESSING REGULAR UPPER BODY CLOTHING: A LOT
STANDING UP FROM CHAIR USING ARMS: A LOT
MOVING FROM LYING ON BACK TO SITTING ON SIDE OF FLAT BED WITH BEDRAILS: A LITTLE
EATING MEALS: A LOT
EATING MEALS: A LITTLE
HELP NEEDED FOR BATHING: A LOT
DAILY ACTIVITIY SCORE: 17
DRESSING REGULAR UPPER BODY CLOTHING: A LOT
MOVING TO AND FROM BED TO CHAIR: A LOT
TOILETING: A LOT
TOILETING: A LOT
DRESSING REGULAR LOWER BODY CLOTHING: A LITTLE
DRESSING REGULAR LOWER BODY CLOTHING: A LITTLE
MOVING FROM LYING ON BACK TO SITTING ON SIDE OF FLAT BED WITH BEDRAILS: A LOT
HELP NEEDED FOR BATHING: A LITTLE
STANDING UP FROM CHAIR USING ARMS: A LOT
TURNING FROM BACK TO SIDE WHILE IN FLAT BAD: A LITTLE
TURNING FROM BACK TO SIDE WHILE IN FLAT BAD: A LOT
CLIMB 3 TO 5 STEPS WITH RAILING: TOTAL
WALKING IN HOSPITAL ROOM: A LOT
HELP NEEDED FOR BATHING: A LOT
MOVING FROM LYING ON BACK TO SITTING ON SIDE OF FLAT BED WITH BEDRAILS: A LITTLE
WALKING IN HOSPITAL ROOM: A LOT
DAILY ACTIVITIY SCORE: 14
DAILY ACTIVITIY SCORE: 14
MOVING TO AND FROM BED TO CHAIR: A LITTLE
PERSONAL GROOMING: A LITTLE
PERSONAL GROOMING: A LOT

## 2024-09-28 ASSESSMENT — ACTIVITIES OF DAILY LIVING (ADL)
FEEDING YOURSELF: DEPENDENT
ADEQUATE_TO_COMPLETE_ADL: YES
DRESSING YOURSELF: DEPENDENT
ASSISTIVE_DEVICE: TRANSFER BELT;WALKER
BATHING: DEPENDENT
TOILETING: DEPENDENT
HEARING - RIGHT EAR: FUNCTIONAL
TOILETING: DEPENDENT
PATIENT'S MEMORY ADEQUATE TO SAFELY COMPLETE DAILY ACTIVITIES?: NO
PATIENT'S MEMORY ADEQUATE TO SAFELY COMPLETE DAILY ACTIVITIES?: NO
WALKS IN HOME: DEPENDENT
GROOMING: DEPENDENT
HEARING - LEFT EAR: FUNCTIONAL
BATHING: DEPENDENT
DRESSING YOURSELF: DEPENDENT
ASSISTIVE_DEVICE: TRANSFER BELT;WALKER
WALKS IN HOME: DEPENDENT
JUDGMENT_ADEQUATE_SAFELY_COMPLETE_DAILY_ACTIVITIES: NO
JUDGMENT_ADEQUATE_SAFELY_COMPLETE_DAILY_ACTIVITIES: NO
FEEDING YOURSELF: DEPENDENT
HEARING - RIGHT EAR: FUNCTIONAL
ADEQUATE_TO_COMPLETE_ADL: YES
GROOMING: DEPENDENT

## 2024-09-28 ASSESSMENT — LIFESTYLE VARIABLES
HOW OFTEN DO YOU HAVE A DRINK CONTAINING ALCOHOL: NEVER
HOW MANY STANDARD DRINKS CONTAINING ALCOHOL DO YOU HAVE ON A TYPICAL DAY: PATIENT DOES NOT DRINK
AUDIT-C TOTAL SCORE: 0
SKIP TO QUESTIONS 9-10: 1
AUDIT-C TOTAL SCORE: 0
HOW OFTEN DO YOU HAVE 6 OR MORE DRINKS ON ONE OCCASION: NEVER

## 2024-09-28 ASSESSMENT — PATIENT HEALTH QUESTIONNAIRE - PHQ9
2. FEELING DOWN, DEPRESSED OR HOPELESS: NOT AT ALL
1. LITTLE INTEREST OR PLEASURE IN DOING THINGS: NOT AT ALL
SUM OF ALL RESPONSES TO PHQ9 QUESTIONS 1 & 2: 0

## 2024-09-28 ASSESSMENT — PAIN SCALES - GENERAL
PAINLEVEL_OUTOF10: 0 - NO PAIN
PAINLEVEL_OUTOF10: 0 - NO PAIN

## 2024-09-28 NOTE — NURSING NOTE
BSSR received.  Patient lying on bed, no distress or discomfort noted.  Safety measures in place.  Call light within reach.

## 2024-09-28 NOTE — PROGRESS NOTES
"Riya Hernandez is a 83 y.o. female on day 1 of admission presenting with Gastrointestinal hemorrhage, unspecified gastrointestinal hemorrhage type.    Subjective   No signs of GI bleed.  Patient has more chest congestion.  On 2 L of oxygen       Objective     Physical Exam  Vitals reviewed.   Constitutional:       Appearance: Normal appearance.   HENT:      Head: Normocephalic and atraumatic.      Right Ear: Tympanic membrane, ear canal and external ear normal.      Left Ear: Tympanic membrane, ear canal and external ear normal.      Nose: Nose normal.      Mouth/Throat:      Pharynx: Oropharynx is clear.   Eyes:      Extraocular Movements: Extraocular movements intact.      Conjunctiva/sclera: Conjunctivae normal.      Pupils: Pupils are equal, round, and reactive to light.   Cardiovascular:      Rate and Rhythm: Normal rate and regular rhythm.      Pulses: Normal pulses.      Heart sounds: Normal heart sounds.   Pulmonary:      Effort: Pulmonary effort is normal.      Breath sounds: Normal breath sounds.   Abdominal:      General: Abdomen is flat. Bowel sounds are normal.      Palpations: Abdomen is soft.   Musculoskeletal:      Cervical back: Normal range of motion and neck supple.   Skin:     General: Skin is warm and dry.   Neurological:      General: No focal deficit present.      Mental Status: She is alert and oriented to person, place, and time.   Psychiatric:         Mood and Affect: Mood normal.         Last Recorded Vitals  Blood pressure 142/57, pulse 64, temperature 36.8 °C (98.2 °F), temperature source Axillary, resp. rate 18, height 1.651 m (5' 5\"), weight 63.5 kg (140 lb), SpO2 98%.  Intake/Output last 3 Shifts:  I/O last 3 completed shifts:  In: 1334.2 (21 mL/kg) [P.O.:740; IV Piggyback:594.2]  Out: 250 (3.9 mL/kg) [Urine:250 (0.1 mL/kg/hr)]  Weight: 63.5 kg     Relevant Results               Scheduled medications  atorvastatin, 80 mg, oral, Nightly  cephalexin, 500 mg, oral, TID  [Held by " provider] dilTIAZem CD, 240 mg, oral, Daily  docusate sodium, 100 mg, oral, BID  donepezil, 10 mg, oral, Nightly  [Held by provider] losartan, 25 mg, oral, Daily  melatonin, 3 mg, oral, Nightly  memantine, 5 mg, oral, Daily  metoprolol succinate XL, 25 mg, oral, Daily  oxygen, , inhalation, Continuous - 02/gases  pantoprazole, 40 mg, oral, Daily before breakfast   Or  pantoprazole, 40 mg, intravenous, Daily before breakfast  polyethylene glycol, 17 g, oral, Daily      Continuous medications  potassium dqqqoqx-L1-2.45%NaCl, 50 mL/hr, Last Rate: 50 mL/hr (09/27/24 1950)  lactated Ringer's, 40 mL/hr, Last Rate: Stopped (09/26/24 1120)      PRN medications  PRN medications: acetaminophen **OR** acetaminophen **OR** acetaminophen, albuterol, benzocaine-menthol, dextromethorphan-guaifenesin, fentaNYL PF, guaiFENesin, HYDROmorphone, hydrOXYzine pamoate, ipratropium, melatonin, meperidine, ondansetron **OR** ondansetron, ondansetron, promethazine.rcntl     XR chest 2 views    Result Date: 9/27/2024  Interpreted By:  Marion Olson, STUDY: XR CHEST 2 VIEWS 9/27/2024 9:32 am   INDICATION: Signs/Symptoms:Cough   COMPARISON: 07/05/2024   ACCESSION NUMBER(S): [id number]   ORDERING CLINICIAN: GENARO OCAMPO   TECHNIQUE: AP erect and lateral views of the chest were acquired.   FINDINGS: Heart is enlarged with calcified plaque visible within the aortic knob and proximal as well as distal descending thoracic aorta.   The lungs are hyperinflated but clear. There are however small bilateral pleural effusions noted on lateral projection bilaterally.       Pulmonary hyperinflation secondary to COPD with lungs clear.   Small bilateral pleural effusions blunting posterior costophrenic angles.   Stable cardiomegaly.   Signed by: Marion Olson 9/27/2024 10:05 AM Dictation workstation:   [id number]    Esophagogastroduodenoscopy (EGD)    Result Date: 9/26/2024  Table formatting from the original result was not included. Impression The esophagus,  stomach and duodenum appeared normal. Findings The esophagus, stomach and duodenum appeared normal. Recommendation If wanting to pursue, will need colonoscopy  Indication Anemia, unspecified type Staff Staff Role Jaswant Roberts MD Proceduralist Medications dextrose 5 % and sodium chloride 0.45 % with KCl 20 mEq/L infusion 1,139.17 mL*  *From user-documented volume (Totals for administrations occurring from 0819 to 0851 on 09/26/24) Preprocedure A history and physical has been performed, and patient medication allergies have been reviewed. The patient's tolerance of previous anesthesia has been reviewed. The risks and benefits of the procedure and the sedation options and risks were discussed with the patient. All questions were answered and informed consent obtained. Details of the Procedure The patient underwent moderate sedation, which was administered by the procedural nurse. The patient's blood pressure, ECG, ETCO2, heart rate, level of consciousness, oxygen and respirations were monitored throughout the procedure. The scope was introduced through the mouth and advanced to the second part of the duodenum. Retroflexion was performed in the cardia. Prior to the procedure, the patient's H. Pylori status was unknown. The patient experienced no blood loss. The procedure was not difficult. The patient tolerated the procedure well. There were no apparent adverse events. Events Procedure Events Event Event Time ENDO SCOPE IN TIME 9/26/2024  8:47 AM ENDO SCOPE OUT TIME 9/26/2024  8:49 AM Specimens No specimens collected Procedure Location [address] [id number] [phone] Referring Provider Oxana Milan, APRN-CNP Procedure Provider Jaswant Roberts MD        Assessment/Plan   Assessment & Plan  Gastrointestinal hemorrhage, unspecified gastrointestinal hemorrhage type    Dementia    Hyperlipidemia    Stage 3 chronic kidney disease,  unspecified whether stage 3a or 3b CKD (Multi)    Essential hypertension    Anemia    Syncope    Chest x-ray ordered  Increased pulmonary toileting  Give a dose of Lasix  Wean off oxygen   H&H stable after blood transfusion  EGD did not show any bleeding  Spoke with the daughter  She made patient DNR as  Does not want to do colonoscopy as patient is not a candidate for any  Blood pressure is stable  Patient has dementia  Plan discharge tomorrow       I spent  minutes in the professional and overall care of this patient.      Trini Regan MD

## 2024-09-28 NOTE — CARE PLAN
The patient's goals for the shift include to go home    The clinical goals for the shift include Comfort and safety

## 2024-09-29 VITALS
HEART RATE: 67 BPM | DIASTOLIC BLOOD PRESSURE: 52 MMHG | OXYGEN SATURATION: 97 % | HEIGHT: 65 IN | WEIGHT: 140 LBS | RESPIRATION RATE: 17 BRPM | SYSTOLIC BLOOD PRESSURE: 128 MMHG | BODY MASS INDEX: 23.32 KG/M2 | TEMPERATURE: 97.9 F

## 2024-09-29 LAB
ANION GAP SERPL CALCULATED.3IONS-SCNC: 12 MMOL/L (ref 10–20)
BUN SERPL-MCNC: 22 MG/DL (ref 6–23)
CALCIUM SERPL-MCNC: 8.8 MG/DL (ref 8.6–10.3)
CHLORIDE SERPL-SCNC: 107 MMOL/L (ref 98–107)
CO2 SERPL-SCNC: 25 MMOL/L (ref 21–32)
CREAT SERPL-MCNC: 0.92 MG/DL (ref 0.5–1.05)
EGFRCR SERPLBLD CKD-EPI 2021: 61 ML/MIN/1.73M*2
ERYTHROCYTE [DISTWIDTH] IN BLOOD BY AUTOMATED COUNT: 17.4 % (ref 11.5–14.5)
GLUCOSE SERPL-MCNC: 83 MG/DL (ref 74–99)
HCT VFR BLD AUTO: 35.9 % (ref 36–46)
HGB BLD-MCNC: 11.5 G/DL (ref 12–16)
MCH RBC QN AUTO: 26.3 PG (ref 26–34)
MCHC RBC AUTO-ENTMCNC: 32 G/DL (ref 32–36)
MCV RBC AUTO: 82 FL (ref 80–100)
NRBC BLD-RTO: 0 /100 WBCS (ref 0–0)
PLATELET # BLD AUTO: 454 X10*3/UL (ref 150–450)
POTASSIUM SERPL-SCNC: 4 MMOL/L (ref 3.5–5.3)
RBC # BLD AUTO: 4.37 X10*6/UL (ref 4–5.2)
SODIUM SERPL-SCNC: 140 MMOL/L (ref 136–145)
WBC # BLD AUTO: 8.5 X10*3/UL (ref 4.4–11.3)

## 2024-09-29 PROCEDURE — 80048 BASIC METABOLIC PNL TOTAL CA: CPT | Performed by: INTERNAL MEDICINE

## 2024-09-29 PROCEDURE — 2500000001 HC RX 250 WO HCPCS SELF ADMINISTERED DRUGS (ALT 637 FOR MEDICARE OP): Performed by: INTERNAL MEDICINE

## 2024-09-29 PROCEDURE — 99239 HOSP IP/OBS DSCHRG MGMT >30: CPT | Performed by: INTERNAL MEDICINE

## 2024-09-29 PROCEDURE — 36415 COLL VENOUS BLD VENIPUNCTURE: CPT | Performed by: INTERNAL MEDICINE

## 2024-09-29 PROCEDURE — 2500000005 HC RX 250 GENERAL PHARMACY W/O HCPCS: Performed by: STUDENT IN AN ORGANIZED HEALTH CARE EDUCATION/TRAINING PROGRAM

## 2024-09-29 PROCEDURE — 85027 COMPLETE CBC AUTOMATED: CPT | Performed by: INTERNAL MEDICINE

## 2024-09-29 RX ORDER — ACETAMINOPHEN 325 MG/1
650 TABLET ORAL EVERY 4 HOURS PRN
Qty: 30 TABLET | Refills: 0 | Status: SHIPPED | OUTPATIENT
Start: 2024-09-29

## 2024-09-29 RX ADMIN — MEMANTINE 5 MG: 5 TABLET ORAL at 08:37

## 2024-09-29 RX ADMIN — METOPROLOL SUCCINATE 25 MG: 25 TABLET, EXTENDED RELEASE ORAL at 08:37

## 2024-09-29 RX ADMIN — PANTOPRAZOLE SODIUM 40 MG: 40 TABLET, DELAYED RELEASE ORAL at 06:11

## 2024-09-29 RX ADMIN — Medication 21 PERCENT: at 07:22

## 2024-09-29 RX ADMIN — CEPHALEXIN 500 MG: 500 CAPSULE ORAL at 14:48

## 2024-09-29 RX ADMIN — CEPHALEXIN 500 MG: 500 CAPSULE ORAL at 08:37

## 2024-09-29 ASSESSMENT — COGNITIVE AND FUNCTIONAL STATUS - GENERAL
STANDING UP FROM CHAIR USING ARMS: A LOT
EATING MEALS: A LITTLE
TOILETING: A LOT
TURNING FROM BACK TO SIDE WHILE IN FLAT BAD: A LOT
MOBILITY SCORE: 11
MOVING FROM LYING ON BACK TO SITTING ON SIDE OF FLAT BED WITH BEDRAILS: A LOT
MOVING TO AND FROM BED TO CHAIR: A LOT
CLIMB 3 TO 5 STEPS WITH RAILING: TOTAL
WALKING IN HOSPITAL ROOM: A LOT
DRESSING REGULAR LOWER BODY CLOTHING: A LITTLE
HELP NEEDED FOR BATHING: A LOT
PERSONAL GROOMING: A LOT

## 2024-09-29 ASSESSMENT — PAIN SCALES - GENERAL: PAINLEVEL_OUTOF10: 0 - NO PAIN

## 2024-09-29 NOTE — PROGRESS NOTES
"Riya Hernandez is a 83 y.o. female on day 2 of admission presenting with Gastrointestinal hemorrhage, unspecified gastrointestinal hemorrhage type.    Subjective   No signs of GI bleed.  Patient is off oxygen     Objective     Physical Exam  Vitals reviewed.   Constitutional:       Appearance: Normal appearance.   HENT:      Head: Normocephalic and atraumatic.      Right Ear: Tympanic membrane, ear canal and external ear normal.      Left Ear: Tympanic membrane, ear canal and external ear normal.      Nose: Nose normal.      Mouth/Throat:      Pharynx: Oropharynx is clear.   Eyes:      Extraocular Movements: Extraocular movements intact.      Conjunctiva/sclera: Conjunctivae normal.      Pupils: Pupils are equal, round, and reactive to light.   Cardiovascular:      Rate and Rhythm: Normal rate and regular rhythm.      Pulses: Normal pulses.      Heart sounds: Normal heart sounds.   Pulmonary:      Effort: Pulmonary effort is normal.      Breath sounds: Normal breath sounds.   Abdominal:      General: Abdomen is flat. Bowel sounds are normal.      Palpations: Abdomen is soft.   Musculoskeletal:      Cervical back: Normal range of motion and neck supple.   Skin:     General: Skin is warm and dry.   Neurological:      General: No focal deficit present.      Mental Status: She is alert and oriented to person, place, and time.   Psychiatric:         Mood and Affect: Mood normal.         Last Recorded Vitals  Blood pressure 137/50, pulse 60, temperature 36.5 °C (97.7 °F), temperature source Oral, resp. rate 18, height 1.651 m (5' 5\"), weight 63.5 kg (140 lb), SpO2 95%.  Intake/Output last 3 Shifts:  I/O last 3 completed shifts:  In: 480 (7.6 mL/kg) [P.O.:480]  Out: 1 (0 mL/kg) [Stool:1]  Weight: 63.5 kg     Relevant Results               Scheduled medications  atorvastatin, 80 mg, oral, Nightly  cephalexin, 500 mg, oral, TID  [Held by provider] dilTIAZem CD, 240 mg, oral, Daily  docusate sodium, 100 mg, oral, " BID  donepezil, 10 mg, oral, Nightly  [Held by provider] losartan, 25 mg, oral, Daily  melatonin, 3 mg, oral, Nightly  memantine, 5 mg, oral, Daily  metoprolol succinate XL, 25 mg, oral, Daily  oxygen, , inhalation, Continuous - 02/gases  pantoprazole, 40 mg, oral, Daily before breakfast   Or  pantoprazole, 40 mg, intravenous, Daily before breakfast  polyethylene glycol, 17 g, oral, Daily      Continuous medications  lactated Ringer's, 40 mL/hr, Last Rate: Stopped (09/26/24 1120)      PRN medications  PRN medications: acetaminophen **OR** acetaminophen **OR** acetaminophen, albuterol, benzocaine-menthol, dextromethorphan-guaifenesin, fentaNYL PF, guaiFENesin, HYDROmorphone, hydrOXYzine pamoate, ipratropium, melatonin, meperidine, ondansetron **OR** ondansetron, ondansetron, promethazine.rcntl     XR chest 2 views    Result Date: 9/27/2024  Interpreted By:  Marion Olson, STUDY: XR CHEST 2 VIEWS 9/27/2024 9:32 am   INDICATION: Signs/Symptoms:Cough   COMPARISON: 07/05/2024   ACCESSION NUMBER(S): [id number]   ORDERING CLINICIAN: GENARO OCAMPO   TECHNIQUE: AP erect and lateral views of the chest were acquired.   FINDINGS: Heart is enlarged with calcified plaque visible within the aortic knob and proximal as well as distal descending thoracic aorta.   The lungs are hyperinflated but clear. There are however small bilateral pleural effusions noted on lateral projection bilaterally.       Pulmonary hyperinflation secondary to COPD with lungs clear.   Small bilateral pleural effusions blunting posterior costophrenic angles.   Stable cardiomegaly.   Signed by: Marion Olson 9/27/2024 10:05 AM Dictation workstation:   [id number]       Assessment/Plan   Assessment & Plan  Gastrointestinal hemorrhage, unspecified gastrointestinal hemorrhage type    Dementia    Hyperlipidemia    Stage 3 chronic kidney disease, unspecified whether stage 3a or 3b CKD (Multi)    Essential hypertension    Anemia    Syncope    Breathing is doing  better  Off oxygen  H&H stable after blood transfusion  EGD did not show any bleeding  Spoke with the daughter  She made patient DNR   Does not want to do colonoscopy as patient is not a candidate for any  Blood pressure is stable  Patient has dementia  Plan discharge tomorrow       I spent  minutes in the professional and overall care of this patient.      Trini Regan MD

## 2024-09-29 NOTE — NURSING NOTE
Order review at end of shift completed.  Patient asleep on bed with even and unlabored breathing.  Safety measures ensured and call light within  reach.

## 2024-09-29 NOTE — PROGRESS NOTES
"Riya Hernandez is a 83 y.o. female on day 3 of admission presenting with Gastrointestinal hemorrhage, unspecified gastrointestinal hemorrhage type.    Subjective   No signs of GI bleed.  Patient is off oxygen   Remains pleasantly confused at her baseline  Objective     Physical Exam  Vitals reviewed.   Constitutional:       Appearance: Normal appearance.   HENT:      Head: Normocephalic and atraumatic.      Right Ear: Tympanic membrane, ear canal and external ear normal.      Left Ear: Tympanic membrane, ear canal and external ear normal.      Nose: Nose normal.      Mouth/Throat:      Pharynx: Oropharynx is clear.   Eyes:      Extraocular Movements: Extraocular movements intact.      Conjunctiva/sclera: Conjunctivae normal.      Pupils: Pupils are equal, round, and reactive to light.   Cardiovascular:      Rate and Rhythm: Normal rate and regular rhythm.      Pulses: Normal pulses.      Heart sounds: Normal heart sounds.   Pulmonary:      Effort: Pulmonary effort is normal.      Breath sounds: Normal breath sounds.   Abdominal:      General: Abdomen is flat. Bowel sounds are normal.      Palpations: Abdomen is soft.   Musculoskeletal:      Cervical back: Normal range of motion and neck supple.   Skin:     General: Skin is warm and dry.   Neurological:      General: No focal deficit present.      Mental Status: She is alert and oriented to person, place, and time.   Psychiatric:         Mood and Affect: Mood normal.         Last Recorded Vitals  Blood pressure 128/52, pulse 67, temperature 36.6 °C (97.9 °F), temperature source Oral, resp. rate 17, height 1.651 m (5' 5\"), weight 63.5 kg (140 lb), SpO2 97%.  Intake/Output last 3 Shifts:  I/O last 3 completed shifts:  In: - (0 mL/kg)   Out: 1 (0 mL/kg) [Stool:1]  Weight: 63.5 kg     Relevant Results               Scheduled medications  atorvastatin, 80 mg, oral, Nightly  cephalexin, 500 mg, oral, TID  [Held by provider] dilTIAZem CD, 240 mg, oral, Daily  docusate " sodium, 100 mg, oral, BID  donepezil, 10 mg, oral, Nightly  [Held by provider] losartan, 25 mg, oral, Daily  melatonin, 3 mg, oral, Nightly  memantine, 5 mg, oral, Daily  metoprolol succinate XL, 25 mg, oral, Daily  oxygen, , inhalation, Continuous - 02/gases  pantoprazole, 40 mg, oral, Daily before breakfast   Or  pantoprazole, 40 mg, intravenous, Daily before breakfast  polyethylene glycol, 17 g, oral, Daily      Continuous medications  lactated Ringer's, 40 mL/hr, Last Rate: Stopped (09/26/24 1120)      PRN medications  PRN medications: acetaminophen **OR** acetaminophen **OR** acetaminophen, albuterol, benzocaine-menthol, dextromethorphan-guaifenesin, fentaNYL PF, guaiFENesin, HYDROmorphone, hydrOXYzine pamoate, ipratropium, melatonin, meperidine, ondansetron **OR** ondansetron, ondansetron, promethazine.rcntl     No results found.  Results for orders placed or performed during the hospital encounter of 09/24/24 (from the past 24 hour(s))   CBC   Result Value Ref Range    WBC 8.5 4.4 - 11.3 x10*3/uL    nRBC 0.0 0.0 - 0.0 /100 WBCs    RBC 4.37 4.00 - 5.20 x10*6/uL    Hemoglobin 11.5 (L) 12.0 - 16.0 g/dL    Hematocrit 35.9 (L) 36.0 - 46.0 %    MCV 82 80 - 100 fL    MCH 26.3 26.0 - 34.0 pg    MCHC 32.0 32.0 - 36.0 g/dL    RDW 17.4 (H) 11.5 - 14.5 %    Platelets 454 (H) 150 - 450 x10*3/uL   Basic Metabolic Panel   Result Value Ref Range    Glucose 83 74 - 99 mg/dL    Sodium 140 136 - 145 mmol/L    Potassium 4.0 3.5 - 5.3 mmol/L    Chloride 107 98 - 107 mmol/L    Bicarbonate 25 21 - 32 mmol/L    Anion Gap 12 10 - 20 mmol/L    Urea Nitrogen 22 6 - 23 mg/dL    Creatinine 0.92 0.50 - 1.05 mg/dL    eGFR 61 >60 mL/min/1.73m*2    Calcium 8.8 8.6 - 10.3 mg/dL         Assessment/Plan   Assessment & Plan  Gastrointestinal hemorrhage, unspecified gastrointestinal hemorrhage type    Dementia    Hyperlipidemia    Stage 3 chronic kidney disease, unspecified whether stage 3a or 3b CKD (Multi)    Essential  hypertension    Anemia    Syncope    Breathing is doing better  Off oxygen  H&H stable after blood transfusion  EGD did not show any bleeding  Spoke with the daughter  She made patient DNR   Does not want to do colonoscopy as patient is not a candidate for any  Blood pressure is stable  Patient has dementia  Had a long conversation with the daughter and grandson she wanted to take him home with providing  She would line up some help  Talk to her in details  Explained that unless she has help lined up this cannot be hard for her to take care of her because she is requiring 24/7 care  It is best to let her go to nursing home and then transition her home when you have everything set up  She agreed to that  Will discharge patient back to facility  Will discontinue Brilinta and aspirin which could be causing patient to have bleeding  She has long history of Aleve use in the past  Hopefully this should keep with diet stable     I spent  minutes in the professional and overall care of this patient.      Trini Regan MD

## 2024-09-29 NOTE — CARE PLAN
The patient's goals for the shift include to go home    The clinical goals for the shift include Comfort ans safety

## 2024-09-29 NOTE — NURSING NOTE
[facility] Report called and given to nurse Flakita, all questions answered at this time. After visit summary and cheng ride printed to go with patient. Patient's daughter at bedside and is aware of discharge and was also provided a copy of pt's AVS.

## 2024-09-29 NOTE — NURSING NOTE
Assumed care.  Patient comfortably lying on bed, no distress noted.  Adult male visitor in the room.  Safety measures ensured and call light within reach.

## 2024-09-29 NOTE — PROGRESS NOTES
09/29/24 1707   Discharge Planning   Expected Discharge Disposition Inter     Patient has an active discharge order.  Patient will return to [facility].  [place] is ready and able to accept patient back today.  [employer] transportation arranged for 1830 .  AVS, goldenrod and DC summary sent to [place] [facility] via [facility].  Daughter is at bedside and aware of discharge plan.  Vera MEDINA updated.     DC PLAN IS SECURE

## 2024-09-29 NOTE — DISCHARGE SUMMARY
Discharge Diagnosis  Gastrointestinal hemorrhage, unspecified gastrointestinal hemorrhage type    Issues Requiring Follow-Up  Coronary artery disease  Sinus dementia  Hypertension    Test Results Pending At Discharge  Pending Labs       No current pending labs.            Hospital Course   Riya Hernandez is a 83 y.o. female presenting with abnormal labs.  Patient is currently at rehab facility where her routine blood work showed drop in H&H and patient sent for further evaluation.  Patient has dementia and a poor historian.  In the emergency room she had near syncopal episode trying to have a bowel movement.  Hemoccult was positive and repeat H&H dropping  EGD done which was unremarkable.  Brilinta aspirin Lovenox stopped.  H&H stabilized after blood transfusion and remained stable.  Family decided not to proceed with colonoscopy.  Discharge back to the facility    Pertinent Physical Exam At Time of Discharge  Physical Exam  Vitals reviewed.   Constitutional:       Appearance: Normal appearance.   HENT:      Head: Normocephalic and atraumatic.      Right Ear: Tympanic membrane, ear canal and external ear normal.      Left Ear: Tympanic membrane, ear canal and external ear normal.      Nose: Nose normal.      Mouth/Throat:      Pharynx: Oropharynx is clear.   Eyes:      Extraocular Movements: Extraocular movements intact.      Conjunctiva/sclera: Conjunctivae normal.      Pupils: Pupils are equal, round, and reactive to light.   Cardiovascular:      Rate and Rhythm: Normal rate and regular rhythm.      Pulses: Normal pulses.      Heart sounds: Normal heart sounds.   Pulmonary:      Effort: Pulmonary effort is normal.      Breath sounds: Normal breath sounds.   Abdominal:      General: Abdomen is flat. Bowel sounds are normal.      Palpations: Abdomen is soft.   Musculoskeletal:      Cervical back: Normal range of motion and neck supple.   Skin:     General: Skin is warm and dry.   Neurological:      General: No  focal deficit present.      Mental Status: She is alert and oriented to person, place, and time.   Psychiatric:         Mood and Affect: Mood normal.         Home Medications     Medication List      CHANGE how you take these medications     * acetaminophen 325 mg tablet; Commonly known as: Tylenol; Take 2   tablets (650 mg) by mouth every 4 hours if needed for mild pain (1 - 3).;   What changed: when to take this   * acetaminophen 325 mg tablet; Commonly known as: Tylenol; Take 2   tablets (650 mg) by mouth every 4 hours if needed for mild pain (1 - 3).;   What changed: You were already taking a medication with the same name, and   this prescription was added. Make sure you understand how and when to take   each.   dextromethorphan-guaifenesin  mg/5 mL oral liquid; Commonly known   as: Robitussin DM; Take 5 mL by mouth every 4 hours if needed for cough.;   What changed: when to take this  * This list has 2 medication(s) that are the same as other medications   prescribed for you. Read the directions carefully, and ask your doctor or   other care provider to review them with you.     CONTINUE taking these medications     albuterol 1.25 mg/3 mL nebulizer solution   atorvastatin 80 mg tablet; Commonly known as: Lipitor   benzocaine-menthol lozenge; Commonly known as: Cepastat Sore Throat;   Dissolve 1 lozenge in the mouth every 2 hours if needed for sore throat.   docusate sodium 100 mg capsule; Commonly known as: Colace; Take 1   capsule (100 mg) by mouth 2 times a day.   donepezil 10 mg tablet; Commonly known as: Aricept   guaiFENesin 600 mg 12 hr tablet; Commonly known as: Mucinex; Take 1   tablet (600 mg) by mouth every 12 hours if needed for congestion. Do not   crush, chew, or split.   hydrOXYzine pamoate 25 mg capsule; Commonly known as: Vistaril; Take 1   capsule (25 mg) by mouth every 8 hours if needed for itching.   melatonin 3 mg tablet; Take 1 tablet (3 mg) by mouth as needed at   bedtime for  sleep.   memantine 5 mg tablet; Commonly known as: Namenda   metoprolol succinate XL 25 mg 24 hr tablet; Commonly known as: Toprol-XL   ondansetron 4 mg/2 mL injection; Commonly known as: Zofran; Infuse 2 mL   (4 mg) into a venous catheter every 8 hours if needed for nausea.   ondansetron ODT 4 mg disintegrating tablet; Commonly known as:   Zofran-ODT; Take 1 tablet (4 mg) by mouth every 8 hours if needed for   nausea.   pantoprazole 40 mg EC tablet; Commonly known as: ProtoNix; Take 1 tablet   (40 mg) by mouth once daily in the morning. Take before meals. Do not   crush, chew, or split.   polyethylene glycol 17 gram packet; Commonly known as: Glycolax,   Miralax; Take 17 g by mouth once daily.     STOP taking these medications     aspirin 81 mg chewable tablet   Brilinta 90 mg tablet; Generic drug: ticagrelor   cephalexin 500 mg capsule; Commonly known as: Keflex   dilTIAZem  mg 24 hr capsule; Commonly known as: Cardizem CD   enoxaparin 40 mg/0.4 mL syringe; Commonly known as: Lovenox   losartan 25 mg tablet; Commonly known as: Cozaar       Outpatient Follow-Up  Future Appointments   Date Time Provider Department Center   10/23/2024  9:15 AM Isidoro Bright MD [id number] None       Trini Regan MD

## 2024-09-30 ENCOUNTER — NURSING HOME VISIT (OUTPATIENT)
Dept: POST ACUTE CARE | Facility: EXTERNAL LOCATION | Age: 84
End: 2024-09-30
Payer: MEDICARE

## 2024-09-30 DIAGNOSIS — K92.2 GASTROINTESTINAL HEMORRHAGE, UNSPECIFIED GASTROINTESTINAL HEMORRHAGE TYPE: Primary | ICD-10-CM

## 2024-09-30 DIAGNOSIS — E78.5 HYPERLIPIDEMIA, UNSPECIFIED HYPERLIPIDEMIA TYPE: ICD-10-CM

## 2024-09-30 DIAGNOSIS — F03.90 DEMENTIA, UNSPECIFIED DEMENTIA SEVERITY, UNSPECIFIED DEMENTIA TYPE, UNSPECIFIED WHETHER BEHAVIORAL, PSYCHOTIC, OR MOOD DISTURBANCE OR ANXIETY (MULTI): ICD-10-CM

## 2024-09-30 DIAGNOSIS — N39.0 URINARY TRACT INFECTION WITHOUT HEMATURIA, SITE UNSPECIFIED: ICD-10-CM

## 2024-09-30 DIAGNOSIS — I25.10 ASHD (ARTERIOSCLEROTIC HEART DISEASE): ICD-10-CM

## 2024-09-30 DIAGNOSIS — I10 HYPERTENSION, UNSPECIFIED TYPE: ICD-10-CM

## 2024-09-30 DIAGNOSIS — J44.9 CHRONIC OBSTRUCTIVE PULMONARY DISEASE, UNSPECIFIED COPD TYPE (MULTI): ICD-10-CM

## 2024-09-30 DIAGNOSIS — Z91.81 AT RISK FOR FALLING: ICD-10-CM

## 2024-09-30 DIAGNOSIS — R53.1 WEAKNESS: ICD-10-CM

## 2024-09-30 PROCEDURE — 99305 1ST NF CARE MODERATE MDM 35: CPT | Performed by: INTERNAL MEDICINE

## 2024-09-30 NOTE — LETTER
Patient: Riya Hernandez  : 1940    Encounter Date: 2024    PLACE OF SERVICE:  [facility]    This is a readmission.    Subjective  Patient ID: Riya Hernandez is a 84 y.o. female who presents for readmission.    Ms. Riya Hernandez is an 83-year-old female with history of dementia with COPD.  She has had a recent GI bleed.  She is currently on PPI.    Review of Systems   Constitutional:  Negative for chills and fever.   Cardiovascular:  Negative for chest pain.   All other systems reviewed and are negative.    Objective  /80   Pulse 74   Temp 36.6 °C (97.8 °F)   Resp 18     Physical Exam  Vitals reviewed.   Constitutional:       General: She is not in acute distress.     Comments: This is a well-developed, well-nourished female, sitting in a chair   HENT:      Right Ear: Tympanic membrane, ear canal and external ear normal.      Left Ear: Tympanic membrane, ear canal and external ear normal.   Eyes:      General: No scleral icterus.     Pupils: Pupils are equal, round, and reactive to light.   Neck:      Vascular: No carotid bruit.   Cardiovascular:      Heart sounds: Normal heart sounds, S1 normal and S2 normal. No murmur heard.     No friction rub.   Pulmonary:      Breath sounds: Decreased breath sounds (throughout) present.   Abdominal:      Palpations: There is no hepatomegaly, splenomegaly or mass.   Musculoskeletal:         General: No swelling or deformity. Normal range of motion.      Cervical back: Neck supple.      Right lower leg: No edema.      Left lower leg: No edema.   Lymphadenopathy:      Cervical: No cervical adenopathy.      Upper Body:      Right upper body: No axillary adenopathy.      Left upper body: No axillary adenopathy.      Lower Body: No right inguinal adenopathy. No left inguinal adenopathy.   Neurological:      Mental Status: She is alert.      Cranial Nerves: Cranial nerves 2-12 are intact. No cranial nerve deficit.      Sensory: No  sensory deficit.      Motor: Motor function is intact. No weakness.      Gait: Gait is intact.      Deep Tendon Reflexes: Reflexes normal.      Comments: The patient is oriented x2.   Psychiatric:         Mood and Affect: Mood normal. Mood is not anxious or depressed. Affect is not angry.         Behavior: Behavior is not agitated.         Thought Content: Thought content normal.         Judgment: Judgment normal.     LAB WORK: Laboratory studies reviewed.    Assessment/Plan  Problem List Items Addressed This Visit             ICD-10-CM       Cardiac and Vasculature    Hypertensive disorder I10    Hyperlipidemia E78.5       Gastrointestinal and Abdominal    Gastrointestinal hemorrhage, unspecified gastrointestinal hemorrhage type - Primary K92.2       Genitourinary and Reproductive    UTI (urinary tract infection) N39.0       Neuro    Dementia F03.90     Other Visit Diagnoses         Codes    Chronic obstructive pulmonary disease, unspecified COPD type (Multi)     J44.9    ASHD (arteriosclerotic heart disease)     I25.10    Weakness     R53.1    At risk for falling     Z91.81        1. Recent GI bleed, on PPI.  2. COPD, on bronchodilator therapy.  3. Hypertension, medically controlled.  4. Dementia, unchanged.  5. ASHD, on aspirin.  6. Hyperlipidemia, on statin.  7. Recent UTI, continue to monitor.  8. Weakness, on PT/OT.  9. Fall risk, fall precautions.    Scribe Attestation  By signing my name below, IEleanor Scribe attest that this documentation has been prepared under the direction and in the presence of Hermann Regan MD.     All medical record entries made by the scribe were personally dictated by me I have reviewed the chart and agree the record accurately reflects my personal performance of his history physical examination and management      Electronically Signed By: Hermann Regan MD   10/7/24 11:47 PM

## 2024-10-07 VITALS
DIASTOLIC BLOOD PRESSURE: 80 MMHG | HEART RATE: 74 BPM | SYSTOLIC BLOOD PRESSURE: 124 MMHG | RESPIRATION RATE: 18 BRPM | TEMPERATURE: 97.8 F

## 2024-10-07 ASSESSMENT — ENCOUNTER SYMPTOMS
FEVER: 0
CHILLS: 0

## 2024-10-07 NOTE — PROGRESS NOTES
PLACE OF SERVICE:  [facility]    This is a readmission.    Subjective   Patient ID: Riya Hernandez is a 84 y.o. female who presents for readmission.    Ms. Riya Hernandez is an 83-year-old female with history of dementia with COPD.  She has had a recent GI bleed.  She is currently on PPI.    Review of Systems   Constitutional:  Negative for chills and fever.   Cardiovascular:  Negative for chest pain.   All other systems reviewed and are negative.    Objective   /80   Pulse 74   Temp 36.6 °C (97.8 °F)   Resp 18     Physical Exam  Vitals reviewed.   Constitutional:       General: She is not in acute distress.     Comments: This is a well-developed, well-nourished female, sitting in a chair   HENT:      Right Ear: Tympanic membrane, ear canal and external ear normal.      Left Ear: Tympanic membrane, ear canal and external ear normal.   Eyes:      General: No scleral icterus.     Pupils: Pupils are equal, round, and reactive to light.   Neck:      Vascular: No carotid bruit.   Cardiovascular:      Heart sounds: Normal heart sounds, S1 normal and S2 normal. No murmur heard.     No friction rub.   Pulmonary:      Breath sounds: Decreased breath sounds (throughout) present.   Abdominal:      Palpations: There is no hepatomegaly, splenomegaly or mass.   Musculoskeletal:         General: No swelling or deformity. Normal range of motion.      Cervical back: Neck supple.      Right lower leg: No edema.      Left lower leg: No edema.   Lymphadenopathy:      Cervical: No cervical adenopathy.      Upper Body:      Right upper body: No axillary adenopathy.      Left upper body: No axillary adenopathy.      Lower Body: No right inguinal adenopathy. No left inguinal adenopathy.   Neurological:      Mental Status: She is alert.      Cranial Nerves: Cranial nerves 2-12 are intact. No cranial nerve deficit.      Sensory: No sensory deficit.      Motor: Motor function is intact. No weakness.       Gait: Gait is intact.      Deep Tendon Reflexes: Reflexes normal.      Comments: The patient is oriented x2.   Psychiatric:         Mood and Affect: Mood normal. Mood is not anxious or depressed. Affect is not angry.         Behavior: Behavior is not agitated.         Thought Content: Thought content normal.         Judgment: Judgment normal.     LAB WORK: Laboratory studies reviewed.    Assessment/Plan   Problem List Items Addressed This Visit             ICD-10-CM       Cardiac and Vasculature    Hypertensive disorder I10    Hyperlipidemia E78.5       Gastrointestinal and Abdominal    Gastrointestinal hemorrhage, unspecified gastrointestinal hemorrhage type - Primary K92.2       Genitourinary and Reproductive    UTI (urinary tract infection) N39.0       Neuro    Dementia F03.90     Other Visit Diagnoses         Codes    Chronic obstructive pulmonary disease, unspecified COPD type (Multi)     J44.9    ASHD (arteriosclerotic heart disease)     I25.10    Weakness     R53.1    At risk for falling     Z91.81        1. Recent GI bleed, on PPI.  2. COPD, on bronchodilator therapy.  3. Hypertension, medically controlled.  4. Dementia, unchanged.  5. ASHD, on aspirin.  6. Hyperlipidemia, on statin.  7. Recent UTI, continue to monitor.  8. Weakness, on PT/OT.  9. Fall risk, fall precautions.    Scribe Attestation  By signing my name below, I, Estevan Chapman attest that this documentation has been prepared under the direction and in the presence of Hermann Regan MD.     All medical record entries made by the scribe were personally dictated by me I have reviewed the chart and agree the record accurately reflects my personal performance of his history physical examination and management

## 2024-10-08 ENCOUNTER — NURSING HOME VISIT (OUTPATIENT)
Dept: POST ACUTE CARE | Facility: EXTERNAL LOCATION | Age: 84
End: 2024-10-08
Payer: MEDICARE

## 2024-10-08 DIAGNOSIS — J44.9 CHRONIC OBSTRUCTIVE PULMONARY DISEASE, UNSPECIFIED COPD TYPE (MULTI): Primary | ICD-10-CM

## 2024-10-08 DIAGNOSIS — Z91.81 AT RISK FOR FALLING: ICD-10-CM

## 2024-10-08 DIAGNOSIS — E78.5 HYPERLIPIDEMIA, UNSPECIFIED HYPERLIPIDEMIA TYPE: ICD-10-CM

## 2024-10-08 DIAGNOSIS — F03.90 DEMENTIA, UNSPECIFIED DEMENTIA SEVERITY, UNSPECIFIED DEMENTIA TYPE, UNSPECIFIED WHETHER BEHAVIORAL, PSYCHOTIC, OR MOOD DISTURBANCE OR ANXIETY (MULTI): ICD-10-CM

## 2024-10-08 DIAGNOSIS — K92.2 GASTROINTESTINAL HEMORRHAGE, UNSPECIFIED GASTROINTESTINAL HEMORRHAGE TYPE: ICD-10-CM

## 2024-10-08 DIAGNOSIS — D64.9 ANEMIA, UNSPECIFIED TYPE: ICD-10-CM

## 2024-10-08 DIAGNOSIS — I25.10 ASHD (ARTERIOSCLEROTIC HEART DISEASE): ICD-10-CM

## 2024-10-08 DIAGNOSIS — I10 HYPERTENSION, UNSPECIFIED TYPE: ICD-10-CM

## 2024-10-08 DIAGNOSIS — R53.1 WEAKNESS: ICD-10-CM

## 2024-10-08 DIAGNOSIS — F01.A11 MILD VASCULAR DEMENTIA WITH AGITATION: ICD-10-CM

## 2024-10-08 DIAGNOSIS — M19.90 OSTEOARTHRITIS, UNSPECIFIED OSTEOARTHRITIS TYPE, UNSPECIFIED SITE: ICD-10-CM

## 2024-10-08 PROCEDURE — 99309 SBSQ NF CARE MODERATE MDM 30: CPT | Performed by: INTERNAL MEDICINE

## 2024-10-08 NOTE — LETTER
Patient: Riya Hernandez  : 1940    Encounter Date: 10/08/2024    PLACE OF SERVICE:  [facility].    This is a subsequent visit.    Subjective  Patient ID: Riya Hernandez is a 84 y.o. female who presents for Follow-up.    Ms. Riya Hernandez is an 84-year-old female with history of dementia with COPD.  She has had prior GI bleed with subsequent anemia.  She is unable to care for herself and requires supportive care.    Review of Systems   Constitutional:  Negative for chills and fever.   Cardiovascular:  Negative for chest pain.   All other systems reviewed and are negative.    Objective  /78   Pulse 76   Temp 36.7 °C (98 °F)   Resp 18     Physical Exam  Vitals reviewed.   Constitutional:       General: She is not in acute distress.     Comments: This is a well-developed, well-nourished female, sitting in a chair.   HENT:      Right Ear: Tympanic membrane, ear canal and external ear normal.      Left Ear: Tympanic membrane, ear canal and external ear normal.   Eyes:      General: No scleral icterus.     Pupils: Pupils are equal, round, and reactive to light.   Neck:      Vascular: No carotid bruit.   Cardiovascular:      Heart sounds: Normal heart sounds, S1 normal and S2 normal. No murmur heard.     No friction rub.   Pulmonary:      Effort: Pulmonary effort is normal.      Breath sounds: Decreased breath sounds (throughout) present.   Abdominal:      Palpations: There is no hepatomegaly, splenomegaly or mass.   Musculoskeletal:         General: No swelling or deformity. Normal range of motion.      Cervical back: Neck supple.      Right lower leg: No edema.      Left lower leg: No edema.   Lymphadenopathy:      Cervical: No cervical adenopathy.      Upper Body:      Right upper body: No axillary adenopathy.      Left upper body: No axillary adenopathy.      Lower Body: No right inguinal adenopathy. No left inguinal adenopathy.   Neurological:      Mental Status: She is  alert.      Cranial Nerves: Cranial nerves 2-12 are intact. No cranial nerve deficit.      Sensory: No sensory deficit.      Motor: Motor function is intact. No weakness.      Gait: Gait is intact.      Deep Tendon Reflexes: Reflexes normal.      Comments: The patient is oriented x2.   Psychiatric:         Mood and Affect: Mood normal. Mood is not anxious or depressed. Affect is not angry.         Behavior: Behavior is not agitated.         Thought Content: Thought content normal.         Judgment: Judgment normal.     LAB WORK:  Laboratory studies were reviewed.    Assessment/Plan  Problem List Items Addressed This Visit             ICD-10-CM       Cardiac and Vasculature    Hypertensive disorder I10    Hyperlipidemia E78.5       Gastrointestinal and Abdominal    Gastrointestinal hemorrhage, unspecified gastrointestinal hemorrhage type K92.2       Hematology and Neoplasia    Anemia D64.9       Neuro    Dementia F03.90     Other Visit Diagnoses         Codes    Chronic obstructive pulmonary disease, unspecified COPD type (Multi)    -  Primary J44.9    ASHD (arteriosclerotic heart disease)     I25.10    Osteoarthritis, unspecified osteoarthritis type, unspecified site     M19.90    Weakness     R53.1    At risk for falling     Z91.81        1. , on bronchodilator therapy.  2. Dementia, unchanged.  3. History of GI bleed.  Continue to monitor.  4. Anemia.  Follow CBC.  5. Hypertension, medically controlled.  6. ASHD, on aspirin.  7. Osteoarthritis, on Tylenol.  8. Hyperlipidemia, on statin.  9. Weakness, on PT/OT.  10. Fall risk, on fall precautions.    Scribe Attestation  By signing my name below, IIsamar Scribe attest that this documentation has been prepared under the direction and in the presence of Hermann Regan MD.     All medical record entries made by the scribe were personally dictated by me I have reviewed the chart and agree the record accurately reflects my personal performance of his history  physical examination and management      Electronically Signed By: Hermann Regan MD   10/22/24  9:34 PM

## 2024-10-09 ENCOUNTER — APPOINTMENT (OUTPATIENT)
Dept: CARDIOLOGY | Facility: CLINIC | Age: 84
End: 2024-10-09
Payer: MEDICARE

## 2024-10-21 VITALS
TEMPERATURE: 98 F | SYSTOLIC BLOOD PRESSURE: 126 MMHG | DIASTOLIC BLOOD PRESSURE: 78 MMHG | RESPIRATION RATE: 18 BRPM | HEART RATE: 76 BPM

## 2024-10-21 ASSESSMENT — ENCOUNTER SYMPTOMS
FEVER: 0
CHILLS: 0

## 2024-10-21 NOTE — PROGRESS NOTES
PLACE OF SERVICE:  [facility].    This is a subsequent visit.    Subjective   Patient ID: Riya Hernandez is a 84 y.o. female who presents for Follow-up.    Ms. Riya Hernandez is an 84-year-old female with history of dementia with COPD.  She has had prior GI bleed with subsequent anemia.  She is unable to care for herself and requires supportive care.    Review of Systems   Constitutional:  Negative for chills and fever.   Cardiovascular:  Negative for chest pain.   All other systems reviewed and are negative.    Objective   /78   Pulse 76   Temp 36.7 °C (98 °F)   Resp 18     Physical Exam  Vitals reviewed.   Constitutional:       General: She is not in acute distress.     Comments: This is a well-developed, well-nourished female, sitting in a chair.   HENT:      Right Ear: Tympanic membrane, ear canal and external ear normal.      Left Ear: Tympanic membrane, ear canal and external ear normal.   Eyes:      General: No scleral icterus.     Pupils: Pupils are equal, round, and reactive to light.   Neck:      Vascular: No carotid bruit.   Cardiovascular:      Heart sounds: Normal heart sounds, S1 normal and S2 normal. No murmur heard.     No friction rub.   Pulmonary:      Effort: Pulmonary effort is normal.      Breath sounds: Decreased breath sounds (throughout) present.   Abdominal:      Palpations: There is no hepatomegaly, splenomegaly or mass.   Musculoskeletal:         General: No swelling or deformity. Normal range of motion.      Cervical back: Neck supple.      Right lower leg: No edema.      Left lower leg: No edema.   Lymphadenopathy:      Cervical: No cervical adenopathy.      Upper Body:      Right upper body: No axillary adenopathy.      Left upper body: No axillary adenopathy.      Lower Body: No right inguinal adenopathy. No left inguinal adenopathy.   Neurological:      Mental Status: She is alert.      Cranial Nerves: Cranial nerves 2-12 are intact. No cranial nerve  deficit.      Sensory: No sensory deficit.      Motor: Motor function is intact. No weakness.      Gait: Gait is intact.      Deep Tendon Reflexes: Reflexes normal.      Comments: The patient is oriented x2.   Psychiatric:         Mood and Affect: Mood normal. Mood is not anxious or depressed. Affect is not angry.         Behavior: Behavior is not agitated.         Thought Content: Thought content normal.         Judgment: Judgment normal.     LAB WORK:  Laboratory studies were reviewed.    Assessment/Plan   Problem List Items Addressed This Visit             ICD-10-CM       Cardiac and Vasculature    Hypertensive disorder I10    Hyperlipidemia E78.5       Gastrointestinal and Abdominal    Gastrointestinal hemorrhage, unspecified gastrointestinal hemorrhage type K92.2       Hematology and Neoplasia    Anemia D64.9       Neuro    Dementia F03.90     Other Visit Diagnoses         Codes    Chronic obstructive pulmonary disease, unspecified COPD type (Multi)    -  Primary J44.9    ASHD (arteriosclerotic heart disease)     I25.10    Osteoarthritis, unspecified osteoarthritis type, unspecified site     M19.90    Weakness     R53.1    At risk for falling     Z91.81        1. , on bronchodilator therapy.  2. Dementia, unchanged.  3. History of GI bleed.  Continue to monitor.  4. Anemia.  Follow CBC.  5. Hypertension, medically controlled.  6. ASHD, on aspirin.  7. Osteoarthritis, on Tylenol.  8. Hyperlipidemia, on statin.  9. Weakness, on PT/OT.  10. Fall risk, on fall precautions.    Scribe Attestation  By signing my name below, IIsamar Scribe attest that this documentation has been prepared under the direction and in the presence of Hermann Regan MD.     All medical record entries made by the scribe were personally dictated by me I have reviewed the chart and agree the record accurately reflects my personal performance of his history physical examination and management

## 2024-10-22 PROBLEM — F01.A11 MILD VASCULAR DEMENTIA WITH AGITATION: Status: ACTIVE | Noted: 2024-10-22

## 2024-10-23 ENCOUNTER — APPOINTMENT (OUTPATIENT)
Dept: CARDIOLOGY | Facility: CLINIC | Age: 84
End: 2024-10-23
Payer: MEDICARE

## 2024-10-27 ENCOUNTER — NURSING HOME VISIT (OUTPATIENT)
Dept: POST ACUTE CARE | Facility: EXTERNAL LOCATION | Age: 84
End: 2024-10-27
Payer: COMMERCIAL

## 2024-10-27 DIAGNOSIS — D64.9 ANEMIA, UNSPECIFIED TYPE: ICD-10-CM

## 2024-10-27 DIAGNOSIS — I10 HYPERTENSION, UNSPECIFIED TYPE: ICD-10-CM

## 2024-10-27 DIAGNOSIS — I25.10 ASHD (ARTERIOSCLEROTIC HEART DISEASE): ICD-10-CM

## 2024-10-27 DIAGNOSIS — Z91.81 AT RISK FOR FALLING: ICD-10-CM

## 2024-10-27 DIAGNOSIS — F03.90 DEMENTIA, UNSPECIFIED DEMENTIA SEVERITY, UNSPECIFIED DEMENTIA TYPE, UNSPECIFIED WHETHER BEHAVIORAL, PSYCHOTIC, OR MOOD DISTURBANCE OR ANXIETY (MULTI): ICD-10-CM

## 2024-10-27 DIAGNOSIS — F41.9 ANXIETY: ICD-10-CM

## 2024-10-27 DIAGNOSIS — E78.5 HYPERLIPIDEMIA, UNSPECIFIED HYPERLIPIDEMIA TYPE: ICD-10-CM

## 2024-10-27 DIAGNOSIS — M19.90 OSTEOARTHRITIS, UNSPECIFIED OSTEOARTHRITIS TYPE, UNSPECIFIED SITE: ICD-10-CM

## 2024-10-27 DIAGNOSIS — K92.2 GASTROINTESTINAL HEMORRHAGE, UNSPECIFIED GASTROINTESTINAL HEMORRHAGE TYPE: ICD-10-CM

## 2024-10-27 DIAGNOSIS — R53.1 WEAKNESS: ICD-10-CM

## 2024-10-27 DIAGNOSIS — J44.9 CHRONIC OBSTRUCTIVE PULMONARY DISEASE, UNSPECIFIED COPD TYPE (MULTI): Primary | ICD-10-CM

## 2024-10-31 VITALS
TEMPERATURE: 98 F | RESPIRATION RATE: 18 BRPM | HEART RATE: 76 BPM | SYSTOLIC BLOOD PRESSURE: 126 MMHG | DIASTOLIC BLOOD PRESSURE: 78 MMHG

## 2024-10-31 ASSESSMENT — ENCOUNTER SYMPTOMS
CHILLS: 0
FEVER: 0

## 2024-11-02 ENCOUNTER — NURSING HOME VISIT (OUTPATIENT)
Dept: POST ACUTE CARE | Facility: EXTERNAL LOCATION | Age: 84
End: 2024-11-02
Payer: MEDICARE

## 2024-11-02 DIAGNOSIS — J44.9 CHRONIC OBSTRUCTIVE PULMONARY DISEASE, UNSPECIFIED COPD TYPE (MULTI): Primary | ICD-10-CM

## 2024-11-02 DIAGNOSIS — D64.9 ANEMIA, UNSPECIFIED TYPE: ICD-10-CM

## 2024-11-02 DIAGNOSIS — F03.90 DEMENTIA, UNSPECIFIED DEMENTIA SEVERITY, UNSPECIFIED DEMENTIA TYPE, UNSPECIFIED WHETHER BEHAVIORAL, PSYCHOTIC, OR MOOD DISTURBANCE OR ANXIETY (MULTI): ICD-10-CM

## 2024-11-02 DIAGNOSIS — I25.10 ASHD (ARTERIOSCLEROTIC HEART DISEASE): ICD-10-CM

## 2024-11-02 DIAGNOSIS — Z91.81 AT RISK FOR FALLING: ICD-10-CM

## 2024-11-02 DIAGNOSIS — F41.9 ANXIETY: ICD-10-CM

## 2024-11-02 DIAGNOSIS — K92.2 GASTROINTESTINAL HEMORRHAGE, UNSPECIFIED GASTROINTESTINAL HEMORRHAGE TYPE: ICD-10-CM

## 2024-11-02 DIAGNOSIS — E78.5 HYPERLIPIDEMIA, UNSPECIFIED HYPERLIPIDEMIA TYPE: ICD-10-CM

## 2024-11-02 DIAGNOSIS — I10 HYPERTENSION, UNSPECIFIED TYPE: ICD-10-CM

## 2024-11-02 DIAGNOSIS — M19.90 OSTEOARTHRITIS, UNSPECIFIED OSTEOARTHRITIS TYPE, UNSPECIFIED SITE: ICD-10-CM

## 2024-11-02 PROCEDURE — 99309 SBSQ NF CARE MODERATE MDM 30: CPT | Performed by: INTERNAL MEDICINE

## 2024-11-02 NOTE — LETTER
Patient: Riya Hernandez  : 1940    Encounter Date: 2024    PLACE OF SERVICE:  [facility].    This is a subsequent visit.    Subjective  Patient ID: Riya Hernandez is a 84 y.o. female who presents for Follow-up.    Ms. Riya Hernandez is an 84-year-old female with history of dementia with COPD.  She is unable to care for herself and requires supportive care.    Review of Systems   Constitutional:  Negative for chills and fever.   Cardiovascular:  Negative for chest pain.   All other systems reviewed and are negative.    Objective  /80   Pulse 78   Temp 36.7 °C (98.1 °F)   Resp 18     Physical Exam  Vitals reviewed.   Constitutional:       General: She is not in acute distress.     Comments: This is a well-developed, well-nourished female, sitting in a chair.   HENT:      Right Ear: Tympanic membrane, ear canal and external ear normal.      Left Ear: Tympanic membrane, ear canal and external ear normal.   Eyes:      General: No scleral icterus.     Pupils: Pupils are equal, round, and reactive to light.   Neck:      Vascular: No carotid bruit.   Cardiovascular:      Heart sounds: Normal heart sounds, S1 normal and S2 normal. No murmur heard.     No friction rub.   Pulmonary:      Effort: Pulmonary effort is normal.      Breath sounds: Decreased breath sounds (throughout) present.   Abdominal:      Palpations: There is no hepatomegaly, splenomegaly or mass.   Musculoskeletal:         General: No swelling or deformity. Normal range of motion.      Cervical back: Neck supple.      Right lower leg: No edema.      Left lower leg: No edema.   Lymphadenopathy:      Cervical: No cervical adenopathy.      Upper Body:      Right upper body: No axillary adenopathy.      Left upper body: No axillary adenopathy.      Lower Body: No right inguinal adenopathy. No left inguinal adenopathy.   Neurological:      Mental Status: She is alert.      Cranial Nerves: Cranial nerves 2-12 are  intact. No cranial nerve deficit.      Sensory: No sensory deficit.      Motor: Motor function is intact. No weakness.      Gait: Gait is intact.      Deep Tendon Reflexes: Reflexes normal.      Comments: The patient is oriented x2.   Psychiatric:         Mood and Affect: Mood normal. Mood is not anxious or depressed. Affect is not angry.         Behavior: Behavior is not agitated.         Thought Content: Thought content normal.         Judgment: Judgment normal.     LAB WORK: Laboratory studies were reviewed.    Assessment/Plan  Problem List Items Addressed This Visit             ICD-10-CM       Cardiac and Vasculature    Hypertensive disorder I10    Hyperlipidemia E78.5       Gastrointestinal and Abdominal    Gastrointestinal hemorrhage, unspecified gastrointestinal hemorrhage type K92.2       Hematology and Neoplasia    Anemia D64.9       Mental Health    Anxiety F41.9       Neuro    Dementia F03.90     Other Visit Diagnoses         Codes    Chronic obstructive pulmonary disease, unspecified COPD type (Multi)    -  Primary J44.9    ASHD (arteriosclerotic heart disease)     I25.10    Osteoarthritis, unspecified osteoarthritis type, unspecified site     M19.90    At risk for falling     Z91.81        1. COPD, on bronchodilator therapy.  2. Dementia, unchanged.  3. Hypertension, med controlled.  4. Anemia, follow CBC.  5. History of GI bleed, continue to monitor.  6. ASHD, on aspirin.  7. Osteoarthritis, on Tylenol.  8. Anxiety, on medication.  9. Hyperlipidemia, on statin.  10. Fall risk, on fall precaution.    Scribe Attestation  By signing my name below, IIsamar Scribe attest that this documentation has been prepared under the direction and in the presence of Hermann Regan MD.     All medical record entries made by the scribe were personally dictated by me I have reviewed the chart and agree the record accurately reflects my personal performance of his history physical examination and  management      Electronically Signed By: Hermann Regan MD   11/5/24 12:52 AM

## 2024-11-04 VITALS
SYSTOLIC BLOOD PRESSURE: 126 MMHG | TEMPERATURE: 98.1 F | DIASTOLIC BLOOD PRESSURE: 80 MMHG | HEART RATE: 78 BPM | RESPIRATION RATE: 18 BRPM

## 2024-11-04 ASSESSMENT — ENCOUNTER SYMPTOMS
FEVER: 0
CHILLS: 0

## 2024-11-04 NOTE — PROGRESS NOTES
PLACE OF SERVICE:  [facility].    This is a subsequent visit.    Subjective   Patient ID: Riya Hernandez is a 84 y.o. female who presents for Follow-up.    Ms. Riya Hernandez is an 84-year-old female with history of dementia with COPD.  She is unable to care for herself and requires supportive care.    Review of Systems   Constitutional:  Negative for chills and fever.   Cardiovascular:  Negative for chest pain.   All other systems reviewed and are negative.    Objective   /80   Pulse 78   Temp 36.7 °C (98.1 °F)   Resp 18     Physical Exam  Vitals reviewed.   Constitutional:       General: She is not in acute distress.     Comments: This is a well-developed, well-nourished female, sitting in a chair.   HENT:      Right Ear: Tympanic membrane, ear canal and external ear normal.      Left Ear: Tympanic membrane, ear canal and external ear normal.   Eyes:      General: No scleral icterus.     Pupils: Pupils are equal, round, and reactive to light.   Neck:      Vascular: No carotid bruit.   Cardiovascular:      Heart sounds: Normal heart sounds, S1 normal and S2 normal. No murmur heard.     No friction rub.   Pulmonary:      Effort: Pulmonary effort is normal.      Breath sounds: Decreased breath sounds (throughout) present.   Abdominal:      Palpations: There is no hepatomegaly, splenomegaly or mass.   Musculoskeletal:         General: No swelling or deformity. Normal range of motion.      Cervical back: Neck supple.      Right lower leg: No edema.      Left lower leg: No edema.   Lymphadenopathy:      Cervical: No cervical adenopathy.      Upper Body:      Right upper body: No axillary adenopathy.      Left upper body: No axillary adenopathy.      Lower Body: No right inguinal adenopathy. No left inguinal adenopathy.   Neurological:      Mental Status: She is alert.      Cranial Nerves: Cranial nerves 2-12 are intact. No cranial nerve deficit.      Sensory: No sensory deficit.       Motor: Motor function is intact. No weakness.      Gait: Gait is intact.      Deep Tendon Reflexes: Reflexes normal.      Comments: The patient is oriented x2.   Psychiatric:         Mood and Affect: Mood normal. Mood is not anxious or depressed. Affect is not angry.         Behavior: Behavior is not agitated.         Thought Content: Thought content normal.         Judgment: Judgment normal.     LAB WORK: Laboratory studies were reviewed.    Assessment/Plan   Problem List Items Addressed This Visit             ICD-10-CM       Cardiac and Vasculature    Hypertensive disorder I10    Hyperlipidemia E78.5       Gastrointestinal and Abdominal    Gastrointestinal hemorrhage, unspecified gastrointestinal hemorrhage type K92.2       Hematology and Neoplasia    Anemia D64.9       Mental Health    Anxiety F41.9       Neuro    Dementia F03.90     Other Visit Diagnoses         Codes    Chronic obstructive pulmonary disease, unspecified COPD type (Multi)    -  Primary J44.9    ASHD (arteriosclerotic heart disease)     I25.10    Osteoarthritis, unspecified osteoarthritis type, unspecified site     M19.90    At risk for falling     Z91.81        1. COPD, on bronchodilator therapy.  2. Dementia, unchanged.  3. Hypertension, med controlled.  4. Anemia, follow CBC.  5. History of GI bleed, continue to monitor.  6. ASHD, on aspirin.  7. Osteoarthritis, on Tylenol.  8. Anxiety, on medication.  9. Hyperlipidemia, on statin.  10. Fall risk, on fall precaution.    Scribe Attestation  By signing my name below, IIsamar Scribe attest that this documentation has been prepared under the direction and in the presence of Hermann Regan MD.     All medical record entries made by the scribe were personally dictated by me I have reviewed the chart and agree the record accurately reflects my personal performance of his history physical examination and management

## 2024-12-14 ENCOUNTER — NURSING HOME VISIT (OUTPATIENT)
Dept: POST ACUTE CARE | Facility: EXTERNAL LOCATION | Age: 84
End: 2024-12-14
Payer: MEDICARE

## 2024-12-14 DIAGNOSIS — Z91.81 AT RISK FOR FALLING: ICD-10-CM

## 2024-12-14 DIAGNOSIS — F03.90 DEMENTIA, UNSPECIFIED DEMENTIA SEVERITY, UNSPECIFIED DEMENTIA TYPE, UNSPECIFIED WHETHER BEHAVIORAL, PSYCHOTIC, OR MOOD DISTURBANCE OR ANXIETY (MULTI): ICD-10-CM

## 2024-12-14 DIAGNOSIS — M19.90 OSTEOARTHRITIS, UNSPECIFIED OSTEOARTHRITIS TYPE, UNSPECIFIED SITE: ICD-10-CM

## 2024-12-14 DIAGNOSIS — E78.5 HYPERLIPIDEMIA, UNSPECIFIED HYPERLIPIDEMIA TYPE: ICD-10-CM

## 2024-12-14 DIAGNOSIS — D64.9 ANEMIA, UNSPECIFIED TYPE: ICD-10-CM

## 2024-12-14 DIAGNOSIS — I25.10 ASHD (ARTERIOSCLEROTIC HEART DISEASE): ICD-10-CM

## 2024-12-14 DIAGNOSIS — I10 HYPERTENSION, UNSPECIFIED TYPE: ICD-10-CM

## 2024-12-14 DIAGNOSIS — J44.9 CHRONIC OBSTRUCTIVE PULMONARY DISEASE, UNSPECIFIED COPD TYPE (MULTI): Primary | ICD-10-CM

## 2024-12-14 DIAGNOSIS — F41.9 ANXIETY: ICD-10-CM

## 2024-12-14 DIAGNOSIS — K92.2 GASTROINTESTINAL HEMORRHAGE, UNSPECIFIED GASTROINTESTINAL HEMORRHAGE TYPE: ICD-10-CM

## 2024-12-14 PROCEDURE — 99309 SBSQ NF CARE MODERATE MDM 30: CPT | Performed by: INTERNAL MEDICINE

## 2024-12-14 NOTE — LETTER
Patient: Riya Hernandez  : 1940    Encounter Date: 2024    PLACE OF SERVICE:  [facility].    This is a subsequent visit.    Subjective  Patient ID: Riya Hernandez is a 84 y.o. female who presents for Follow-up.    Ms. Riya Hernandez is an 84-year-old female with history of COPD.  She suffers from dementia.  She is unable to care for herself.  She requires supportive care.    Review of Systems   Constitutional:  Negative for chills and fever.   Cardiovascular:  Negative for chest pain.   All other systems reviewed and are negative.    Objective  /78   Pulse 80   Temp 36.7 °C (98 °F)   Resp 18     Physical Exam  Vitals reviewed.   Constitutional:       General: She is not in acute distress.     Comments: This is a well-developed, well-nourished female, sitting in a chair.   HENT:      Right Ear: Tympanic membrane, ear canal and external ear normal.      Left Ear: Tympanic membrane, ear canal and external ear normal.   Eyes:      General: No scleral icterus.     Pupils: Pupils are equal, round, and reactive to light.   Neck:      Vascular: No carotid bruit.   Cardiovascular:      Heart sounds: Normal heart sounds, S1 normal and S2 normal. No murmur heard.     No friction rub.   Pulmonary:      Effort: Pulmonary effort is normal.      Breath sounds: Decreased breath sounds (throughout) present.   Abdominal:      Palpations: There is no hepatomegaly, splenomegaly or mass.   Musculoskeletal:         General: No swelling or deformity. Normal range of motion.      Cervical back: Neck supple.      Right lower leg: No edema.      Left lower leg: No edema.   Lymphadenopathy:      Cervical: No cervical adenopathy.      Upper Body:      Right upper body: No axillary adenopathy.      Left upper body: No axillary adenopathy.      Lower Body: No right inguinal adenopathy. No left inguinal adenopathy.   Neurological:      Mental Status: She is alert.      Cranial Nerves: Cranial  nerves 2-12 are intact. No cranial nerve deficit.      Sensory: No sensory deficit.      Motor: Motor function is intact. No weakness.      Gait: Gait is intact.      Deep Tendon Reflexes: Reflexes normal.      Comments: The patient is oriented x2.   Psychiatric:         Mood and Affect: Mood normal. Mood is not anxious or depressed. Affect is not angry.         Behavior: Behavior is not agitated.         Thought Content: Thought content normal.         Judgment: Judgment normal.     LAB WORK:  Laboratory studies were reviewed.    Assessment/Plan  Problem List Items Addressed This Visit             ICD-10-CM       Cardiac and Vasculature    Hypertensive disorder I10    Hyperlipidemia E78.5       Gastrointestinal and Abdominal    Gastrointestinal hemorrhage, unspecified gastrointestinal hemorrhage type K92.2       Hematology and Neoplasia    Anemia D64.9       Mental Health    Anxiety F41.9       Neuro    Dementia F03.90     Other Visit Diagnoses         Codes    Chronic obstructive pulmonary disease, unspecified COPD type (Multi)    -  Primary J44.9    Osteoarthritis, unspecified osteoarthritis type, unspecified site     M19.90    ASHD (arteriosclerotic heart disease)     I25.10    At risk for falling     Z91.81        1. COPD, on bronchodilator therapy.  2. Hypertension, medically controlled.  3. Anemia.  Follow CBC.  4. Dementia, unchanged.  5. Hyperlipidemia, on statin.  6. Anxiety, on medication.  7. Osteoarthritis, on Tylenol.  8. ASHD, on aspirin.  9. History of GI bleed.  Continue to monitor.  10. Fall risk, on fall precautions.    Scribe Attestation  By signing my name below, IIsamar Scribe attest that this documentation has been prepared under the direction and in the presence of Hermann Regan MD.     All medical record entries made by the scribe were personally dictated by me I have reviewed the chart and agree the record accurately reflects my personal performance of his history physical  examination and management      Electronically Signed By: Hermann Regan MD   12/17/24 12:10 AM

## 2024-12-16 VITALS
SYSTOLIC BLOOD PRESSURE: 128 MMHG | HEART RATE: 80 BPM | RESPIRATION RATE: 18 BRPM | TEMPERATURE: 98 F | DIASTOLIC BLOOD PRESSURE: 78 MMHG

## 2024-12-16 ASSESSMENT — ENCOUNTER SYMPTOMS
CHILLS: 0
FEVER: 0

## 2024-12-16 NOTE — PROGRESS NOTES
PLACE OF SERVICE:  [facility].    This is a subsequent visit.    Subjective   Patient ID: Riya Hernandez is a 84 y.o. female who presents for Follow-up.    Ms. Riya Hernandez is an 84-year-old female with history of COPD.  She suffers from dementia.  She is unable to care for herself.  She requires supportive care.    Review of Systems   Constitutional:  Negative for chills and fever.   Cardiovascular:  Negative for chest pain.   All other systems reviewed and are negative.    Objective   /78   Pulse 80   Temp 36.7 °C (98 °F)   Resp 18     Physical Exam  Vitals reviewed.   Constitutional:       General: She is not in acute distress.     Comments: This is a well-developed, well-nourished female, sitting in a chair.   HENT:      Right Ear: Tympanic membrane, ear canal and external ear normal.      Left Ear: Tympanic membrane, ear canal and external ear normal.   Eyes:      General: No scleral icterus.     Pupils: Pupils are equal, round, and reactive to light.   Neck:      Vascular: No carotid bruit.   Cardiovascular:      Heart sounds: Normal heart sounds, S1 normal and S2 normal. No murmur heard.     No friction rub.   Pulmonary:      Effort: Pulmonary effort is normal.      Breath sounds: Decreased breath sounds (throughout) present.   Abdominal:      Palpations: There is no hepatomegaly, splenomegaly or mass.   Musculoskeletal:         General: No swelling or deformity. Normal range of motion.      Cervical back: Neck supple.      Right lower leg: No edema.      Left lower leg: No edema.   Lymphadenopathy:      Cervical: No cervical adenopathy.      Upper Body:      Right upper body: No axillary adenopathy.      Left upper body: No axillary adenopathy.      Lower Body: No right inguinal adenopathy. No left inguinal adenopathy.   Neurological:      Mental Status: She is alert.      Cranial Nerves: Cranial nerves 2-12 are intact. No cranial nerve deficit.      Sensory: No sensory  deficit.      Motor: Motor function is intact. No weakness.      Gait: Gait is intact.      Deep Tendon Reflexes: Reflexes normal.      Comments: The patient is oriented x2.   Psychiatric:         Mood and Affect: Mood normal. Mood is not anxious or depressed. Affect is not angry.         Behavior: Behavior is not agitated.         Thought Content: Thought content normal.         Judgment: Judgment normal.     LAB WORK:  Laboratory studies were reviewed.    Assessment/Plan   Problem List Items Addressed This Visit             ICD-10-CM       Cardiac and Vasculature    Hypertensive disorder I10    Hyperlipidemia E78.5       Gastrointestinal and Abdominal    Gastrointestinal hemorrhage, unspecified gastrointestinal hemorrhage type K92.2       Hematology and Neoplasia    Anemia D64.9       Mental Health    Anxiety F41.9       Neuro    Dementia F03.90     Other Visit Diagnoses         Codes    Chronic obstructive pulmonary disease, unspecified COPD type (Multi)    -  Primary J44.9    Osteoarthritis, unspecified osteoarthritis type, unspecified site     M19.90    ASHD (arteriosclerotic heart disease)     I25.10    At risk for falling     Z91.81        1. COPD, on bronchodilator therapy.  2. Hypertension, medically controlled.  3. Anemia.  Follow CBC.  4. Dementia, unchanged.  5. Hyperlipidemia, on statin.  6. Anxiety, on medication.  7. Osteoarthritis, on Tylenol.  8. ASHD, on aspirin.  9. History of GI bleed.  Continue to monitor.  10. Fall risk, on fall precautions.    Scribe Attestation  By signing my name below, IIsamar Scribe attest that this documentation has been prepared under the direction and in the presence of Hermann Regan MD.     All medical record entries made by the scribe were personally dictated by me I have reviewed the chart and agree the record accurately reflects my personal performance of his history physical examination and management

## 2024-12-19 ENCOUNTER — NURSING HOME VISIT (OUTPATIENT)
Dept: POST ACUTE CARE | Facility: EXTERNAL LOCATION | Age: 84
End: 2024-12-19
Payer: MEDICARE

## 2024-12-19 DIAGNOSIS — R53.1 WEAKNESS: ICD-10-CM

## 2024-12-19 DIAGNOSIS — K92.2 GASTROINTESTINAL HEMORRHAGE, UNSPECIFIED GASTROINTESTINAL HEMORRHAGE TYPE: ICD-10-CM

## 2024-12-19 DIAGNOSIS — I10 HYPERTENSION, UNSPECIFIED TYPE: ICD-10-CM

## 2024-12-19 DIAGNOSIS — F03.90 DEMENTIA, UNSPECIFIED DEMENTIA SEVERITY, UNSPECIFIED DEMENTIA TYPE, UNSPECIFIED WHETHER BEHAVIORAL, PSYCHOTIC, OR MOOD DISTURBANCE OR ANXIETY (MULTI): Primary | ICD-10-CM

## 2024-12-19 DIAGNOSIS — D64.9 ANEMIA, UNSPECIFIED TYPE: ICD-10-CM

## 2024-12-19 DIAGNOSIS — J44.9 CHRONIC OBSTRUCTIVE PULMONARY DISEASE, UNSPECIFIED COPD TYPE (MULTI): ICD-10-CM

## 2024-12-19 DIAGNOSIS — M19.90 OSTEOARTHRITIS, UNSPECIFIED OSTEOARTHRITIS TYPE, UNSPECIFIED SITE: ICD-10-CM

## 2024-12-19 DIAGNOSIS — Z91.81 AT RISK FOR FALLING: ICD-10-CM

## 2024-12-19 DIAGNOSIS — F41.9 ANXIETY: ICD-10-CM

## 2024-12-19 DIAGNOSIS — E78.5 HYPERLIPIDEMIA, UNSPECIFIED HYPERLIPIDEMIA TYPE: ICD-10-CM

## 2024-12-19 DIAGNOSIS — I25.10 ASHD (ARTERIOSCLEROTIC HEART DISEASE): ICD-10-CM

## 2024-12-19 PROCEDURE — 99309 SBSQ NF CARE MODERATE MDM 30: CPT | Performed by: INTERNAL MEDICINE

## 2024-12-19 NOTE — LETTER
Patient: Riya Hernandez  : 1940    Encounter Date: 2024    PLACE OF SERVICE:  [facility]    This is a subsequent visit.    Subjective  Patient ID: Riya Hernandez is a 84 y.o. female who presents for Follow-up.    Ms. Riya Hernandez is an 84-year-old female with history of dementia with COPD.  She has several medical issues and is unable to care for herself.  She requires supportive care.    Review of Systems   Constitutional:  Negative for chills and fever.   Cardiovascular:  Negative for chest pain.   All other systems reviewed and are negative.    Objective  /80   Pulse 76   Temp 36.7 °C (98.1 °F)   Resp 18     Physical Exam  Vitals reviewed.   Constitutional:       General: She is not in acute distress.     Comments: This is a well-developed, well-nourished female, sitting in a chair   HENT:      Right Ear: Tympanic membrane, ear canal and external ear normal.      Left Ear: Tympanic membrane, ear canal and external ear normal.   Eyes:      General: No scleral icterus.     Pupils: Pupils are equal, round, and reactive to light.   Neck:      Vascular: No carotid bruit.   Cardiovascular:      Heart sounds: Normal heart sounds, S1 normal and S2 normal. No murmur heard.     No friction rub.   Pulmonary:      Breath sounds: Decreased breath sounds (throughout) present.   Abdominal:      Palpations: There is no hepatomegaly, splenomegaly or mass.   Musculoskeletal:         General: No swelling or deformity. Normal range of motion.      Cervical back: Neck supple.      Right lower leg: No edema.      Left lower leg: No edema.   Lymphadenopathy:      Cervical: No cervical adenopathy.      Upper Body:      Right upper body: No axillary adenopathy.      Left upper body: No axillary adenopathy.      Lower Body: No right inguinal adenopathy. No left inguinal adenopathy.   Neurological:      Mental Status: She is alert.      Cranial Nerves: Cranial nerves 2-12 are intact. No  cranial nerve deficit.      Sensory: No sensory deficit.      Motor: Motor function is intact. No weakness.      Gait: Gait is intact.      Deep Tendon Reflexes: Reflexes normal.      Comments: The patient is oriented x2.   Psychiatric:         Mood and Affect: Mood normal. Mood is not anxious or depressed. Affect is not angry.         Behavior: Behavior is not agitated.         Thought Content: Thought content normal.         Judgment: Judgment normal.     LAB WORK: Laboratory studies reviewed.    Assessment/Plan  Problem List Items Addressed This Visit             ICD-10-CM       Cardiac and Vasculature    Hypertensive disorder I10    Hyperlipidemia E78.5       Gastrointestinal and Abdominal    Gastrointestinal hemorrhage, unspecified gastrointestinal hemorrhage type K92.2       Hematology and Neoplasia    Anemia D64.9       Mental Health    Anxiety F41.9       Neuro    Dementia - Primary F03.90     Other Visit Diagnoses         Codes    Chronic obstructive pulmonary disease, unspecified COPD type (Multi)     J44.9    ASHD (arteriosclerotic heart disease)     I25.10    Osteoarthritis, unspecified osteoarthritis type, unspecified site     M19.90    Weakness     R53.1    At risk for falling     Z91.81        1. Dementia, unchanged.  2. COPD, on bronchodilator therapy.  3. Anemia, follow CBC.  4. Hypertension, med controlled.  5. ASHD, on aspirin.  6. History of GI bleed, continue to monitor.  7. Hyperlipidemia, on statin.  8. Osteoarthritis, on Tylenol.  9. Anxiety, on medication.  10. Weakness, scheduled for PT/OT.  11. Fall risk, fall precautions.    Scribe Attestation  By signing my name below, IEleanor Scribe attest that this documentation has been prepared under the direction and in the presence of Hermann Regan MD.     All medical record entries made by the scribe were personally dictated by me I have reviewed the chart and agree the record accurately reflects my personal performance of his history  physical examination and management      Electronically Signed By: Hermann Regan MD   12/26/24 10:53 AM

## 2024-12-21 VITALS
HEART RATE: 76 BPM | RESPIRATION RATE: 18 BRPM | SYSTOLIC BLOOD PRESSURE: 130 MMHG | TEMPERATURE: 98.1 F | DIASTOLIC BLOOD PRESSURE: 80 MMHG

## 2024-12-21 ASSESSMENT — ENCOUNTER SYMPTOMS
CHILLS: 0
FEVER: 0

## 2024-12-21 NOTE — PROGRESS NOTES
PLACE OF SERVICE:  [facility]    This is a subsequent visit.    Subjective   Patient ID: Riya Hernandez is a 84 y.o. female who presents for Follow-up.    Ms. Riya Hernandez is an 84-year-old female with history of dementia with COPD.  She has several medical issues and is unable to care for herself.  She requires supportive care.    Review of Systems   Constitutional:  Negative for chills and fever.   Cardiovascular:  Negative for chest pain.   All other systems reviewed and are negative.    Objective   /80   Pulse 76   Temp 36.7 °C (98.1 °F)   Resp 18     Physical Exam  Vitals reviewed.   Constitutional:       General: She is not in acute distress.     Comments: This is a well-developed, well-nourished female, sitting in a chair   HENT:      Right Ear: Tympanic membrane, ear canal and external ear normal.      Left Ear: Tympanic membrane, ear canal and external ear normal.   Eyes:      General: No scleral icterus.     Pupils: Pupils are equal, round, and reactive to light.   Neck:      Vascular: No carotid bruit.   Cardiovascular:      Heart sounds: Normal heart sounds, S1 normal and S2 normal. No murmur heard.     No friction rub.   Pulmonary:      Breath sounds: Decreased breath sounds (throughout) present.   Abdominal:      Palpations: There is no hepatomegaly, splenomegaly or mass.   Musculoskeletal:         General: No swelling or deformity. Normal range of motion.      Cervical back: Neck supple.      Right lower leg: No edema.      Left lower leg: No edema.   Lymphadenopathy:      Cervical: No cervical adenopathy.      Upper Body:      Right upper body: No axillary adenopathy.      Left upper body: No axillary adenopathy.      Lower Body: No right inguinal adenopathy. No left inguinal adenopathy.   Neurological:      Mental Status: She is alert.      Cranial Nerves: Cranial nerves 2-12 are intact. No cranial nerve deficit.      Sensory: No sensory deficit.      Motor:  Motor function is intact. No weakness.      Gait: Gait is intact.      Deep Tendon Reflexes: Reflexes normal.      Comments: The patient is oriented x2.   Psychiatric:         Mood and Affect: Mood normal. Mood is not anxious or depressed. Affect is not angry.         Behavior: Behavior is not agitated.         Thought Content: Thought content normal.         Judgment: Judgment normal.     LAB WORK: Laboratory studies reviewed.    Assessment/Plan   Problem List Items Addressed This Visit             ICD-10-CM       Cardiac and Vasculature    Hypertensive disorder I10    Hyperlipidemia E78.5       Gastrointestinal and Abdominal    Gastrointestinal hemorrhage, unspecified gastrointestinal hemorrhage type K92.2       Hematology and Neoplasia    Anemia D64.9       Mental Health    Anxiety F41.9       Neuro    Dementia - Primary F03.90     Other Visit Diagnoses         Codes    Chronic obstructive pulmonary disease, unspecified COPD type (Multi)     J44.9    ASHD (arteriosclerotic heart disease)     I25.10    Osteoarthritis, unspecified osteoarthritis type, unspecified site     M19.90    Weakness     R53.1    At risk for falling     Z91.81        1. Dementia, unchanged.  2. COPD, on bronchodilator therapy.  3. Anemia, follow CBC.  4. Hypertension, med controlled.  5. ASHD, on aspirin.  6. History of GI bleed, continue to monitor.  7. Hyperlipidemia, on statin.  8. Osteoarthritis, on Tylenol.  9. Anxiety, on medication.  10. Weakness, scheduled for PT/OT.  11. Fall risk, fall precautions.    Scribe Attestation  By signing my name below, Eleanor BENDER Scribe attest that this documentation has been prepared under the direction and in the presence of Hermann Regan MD.     All medical record entries made by the scribe were personally dictated by me I have reviewed the chart and agree the record accurately reflects my personal performance of his history physical examination and management

## 2025-01-06 ENCOUNTER — NURSING HOME VISIT (OUTPATIENT)
Dept: POST ACUTE CARE | Facility: EXTERNAL LOCATION | Age: 85
End: 2025-01-06
Payer: MEDICARE

## 2025-01-06 DIAGNOSIS — I25.10 ASHD (ARTERIOSCLEROTIC HEART DISEASE): ICD-10-CM

## 2025-01-06 DIAGNOSIS — F41.9 ANXIETY: ICD-10-CM

## 2025-01-06 DIAGNOSIS — I10 HYPERTENSION, UNSPECIFIED TYPE: ICD-10-CM

## 2025-01-06 DIAGNOSIS — D64.9 ANEMIA, UNSPECIFIED TYPE: ICD-10-CM

## 2025-01-06 DIAGNOSIS — F03.90 DEMENTIA, UNSPECIFIED DEMENTIA SEVERITY, UNSPECIFIED DEMENTIA TYPE, UNSPECIFIED WHETHER BEHAVIORAL, PSYCHOTIC, OR MOOD DISTURBANCE OR ANXIETY (MULTI): ICD-10-CM

## 2025-01-06 DIAGNOSIS — J44.9 CHRONIC OBSTRUCTIVE PULMONARY DISEASE, UNSPECIFIED COPD TYPE (MULTI): Primary | ICD-10-CM

## 2025-01-06 DIAGNOSIS — N18.30 STAGE 3 CHRONIC KIDNEY DISEASE, UNSPECIFIED WHETHER STAGE 3A OR 3B CKD (MULTI): ICD-10-CM

## 2025-01-06 DIAGNOSIS — Z91.81 AT RISK FOR FALLING: ICD-10-CM

## 2025-01-06 DIAGNOSIS — M19.90 OSTEOARTHRITIS, UNSPECIFIED OSTEOARTHRITIS TYPE, UNSPECIFIED SITE: ICD-10-CM

## 2025-01-06 DIAGNOSIS — E78.5 HYPERLIPIDEMIA, UNSPECIFIED HYPERLIPIDEMIA TYPE: ICD-10-CM

## 2025-01-06 DIAGNOSIS — K92.2 GASTROINTESTINAL HEMORRHAGE, UNSPECIFIED GASTROINTESTINAL HEMORRHAGE TYPE: ICD-10-CM

## 2025-01-06 PROCEDURE — 99309 SBSQ NF CARE MODERATE MDM 30: CPT | Performed by: INTERNAL MEDICINE

## 2025-01-06 NOTE — LETTER
Patient: Riya Hernandez  : 1940    Encounter Date: 2025    PLACE OF SERVICE:  [facility]    This is a subsequent visit.    Subjective  Patient ID: Riya Hernandez is a 84 y.o. female who presents for Follow-up.    Ms. Riya Hernandez is an 84-year-old female with history of COPD.  She suffers from dementia and is unable to care for herself.  She requires supportive care.    Review of Systems   Constitutional:  Negative for chills and fever.   Cardiovascular:  Negative for chest pain.   All other systems reviewed and are negative.    Objective  /82   Pulse 78   Temp 36.7 °C (98.1 °F)   Resp 18     Physical Exam  Vitals reviewed.   Constitutional:       General: She is not in acute distress.     Comments: This is a well-developed and well-nourished female, sitting in a chair.   HENT:      Right Ear: Tympanic membrane, ear canal and external ear normal.      Left Ear: Tympanic membrane, ear canal and external ear normal.   Eyes:      General: No scleral icterus.     Pupils: Pupils are equal, round, and reactive to light.   Neck:      Vascular: No carotid bruit.   Cardiovascular:      Heart sounds: Normal heart sounds, S1 normal and S2 normal. No murmur heard.     No friction rub.   Pulmonary:      Breath sounds: Decreased breath sounds (throughout) present.   Abdominal:      Palpations: There is no hepatomegaly, splenomegaly or mass.   Musculoskeletal:         General: No swelling or deformity. Normal range of motion.      Cervical back: Neck supple.      Right lower leg: No edema.      Left lower leg: No edema.   Lymphadenopathy:      Cervical: No cervical adenopathy.      Upper Body:      Right upper body: No axillary adenopathy.      Left upper body: No axillary adenopathy.      Lower Body: No right inguinal adenopathy. No left inguinal adenopathy.   Neurological:      Mental Status: She is alert.      Cranial Nerves: Cranial nerves 2-12 are intact. No cranial nerve  deficit.      Sensory: No sensory deficit.      Motor: Motor function is intact. No weakness.      Gait: Gait is intact.      Deep Tendon Reflexes: Reflexes normal.      Comments: The patient is oriented x2.   Psychiatric:         Mood and Affect: Mood normal. Mood is not anxious or depressed. Affect is not angry.         Behavior: Behavior is not agitated.         Thought Content: Thought content normal.         Judgment: Judgment normal.     LAB WORK: Laboratory studies were reviewed.    Assessment/Plan  Problem List Items Addressed This Visit             ICD-10-CM       Cardiac and Vasculature    Hypertensive disorder I10    Hyperlipidemia E78.5       Gastrointestinal and Abdominal    Gastrointestinal hemorrhage, unspecified gastrointestinal hemorrhage type K92.2       Hematology and Neoplasia    Anemia D64.9       Mental Health    Anxiety F41.9       Neuro    Dementia F03.90     Other Visit Diagnoses         Codes    Chronic obstructive pulmonary disease, unspecified COPD type (Multi)    -  Primary J44.9    ASHD (arteriosclerotic heart disease)     I25.10    Osteoarthritis, unspecified osteoarthritis type, unspecified site     M19.90    At risk for falling     Z91.81        1. COPD, on bronchodilator therapy.  2. Hypertension, med controlled.  3. Dementia, unchanged.  4. Anemia, follow CBC.  5. ASHD, on aspirin.  6. Anxiety, on medication.  7. Osteoarthritis, on Tylenol.  8. Hyperlipidemia, on statin.  9. History of GI bleed, continue to monitor.  10. Fall risk, fall precautions.    Scribe Attestation  By signing my name below, IEleanor Scribe attest that this documentation has been prepared under the direction and in the presence of Hermann Regan MD.     All medical record entries made by the scribe were personally dictated by me I have reviewed the chart and agree the record accurately reflects my personal performance of his history physical examination and management      Electronically Signed By:  Hermann Regan MD   1/16/25 11:31 PM

## 2025-01-13 VITALS
TEMPERATURE: 98.1 F | RESPIRATION RATE: 18 BRPM | HEART RATE: 78 BPM | DIASTOLIC BLOOD PRESSURE: 82 MMHG | SYSTOLIC BLOOD PRESSURE: 128 MMHG

## 2025-01-13 ASSESSMENT — ENCOUNTER SYMPTOMS
FEVER: 0
CHILLS: 0

## 2025-01-13 NOTE — PROGRESS NOTES
PLACE OF SERVICE:  [facility]    This is a subsequent visit.    Subjective   Patient ID: Riya Hernandez is a 84 y.o. female who presents for Follow-up.    Ms. Riya Hernandez is an 84-year-old female with history of COPD.  She suffers from dementia and is unable to care for herself.  She requires supportive care.    Review of Systems   Constitutional:  Negative for chills and fever.   Cardiovascular:  Negative for chest pain.   All other systems reviewed and are negative.    Objective   /82   Pulse 78   Temp 36.7 °C (98.1 °F)   Resp 18     Physical Exam  Vitals reviewed.   Constitutional:       General: She is not in acute distress.     Comments: This is a well-developed and well-nourished female, sitting in a chair.   HENT:      Right Ear: Tympanic membrane, ear canal and external ear normal.      Left Ear: Tympanic membrane, ear canal and external ear normal.   Eyes:      General: No scleral icterus.     Pupils: Pupils are equal, round, and reactive to light.   Neck:      Vascular: No carotid bruit.   Cardiovascular:      Heart sounds: Normal heart sounds, S1 normal and S2 normal. No murmur heard.     No friction rub.   Pulmonary:      Breath sounds: Decreased breath sounds (throughout) present.   Abdominal:      Palpations: There is no hepatomegaly, splenomegaly or mass.   Musculoskeletal:         General: No swelling or deformity. Normal range of motion.      Cervical back: Neck supple.      Right lower leg: No edema.      Left lower leg: No edema.   Lymphadenopathy:      Cervical: No cervical adenopathy.      Upper Body:      Right upper body: No axillary adenopathy.      Left upper body: No axillary adenopathy.      Lower Body: No right inguinal adenopathy. No left inguinal adenopathy.   Neurological:      Mental Status: She is alert.      Cranial Nerves: Cranial nerves 2-12 are intact. No cranial nerve deficit.      Sensory: No sensory deficit.      Motor: Motor function is  intact. No weakness.      Gait: Gait is intact.      Deep Tendon Reflexes: Reflexes normal.      Comments: The patient is oriented x2.   Psychiatric:         Mood and Affect: Mood normal. Mood is not anxious or depressed. Affect is not angry.         Behavior: Behavior is not agitated.         Thought Content: Thought content normal.         Judgment: Judgment normal.     LAB WORK: Laboratory studies were reviewed.    Assessment/Plan   Problem List Items Addressed This Visit             ICD-10-CM       Cardiac and Vasculature    Hypertensive disorder I10    Hyperlipidemia E78.5       Gastrointestinal and Abdominal    Gastrointestinal hemorrhage, unspecified gastrointestinal hemorrhage type K92.2       Hematology and Neoplasia    Anemia D64.9       Mental Health    Anxiety F41.9       Neuro    Dementia F03.90     Other Visit Diagnoses         Codes    Chronic obstructive pulmonary disease, unspecified COPD type (Multi)    -  Primary J44.9    ASHD (arteriosclerotic heart disease)     I25.10    Osteoarthritis, unspecified osteoarthritis type, unspecified site     M19.90    At risk for falling     Z91.81        1. COPD, on bronchodilator therapy.  2. Hypertension, med controlled.  3. Dementia, unchanged.  4. Anemia, follow CBC.  5. ASHD, on aspirin.  6. Anxiety, on medication.  7. Osteoarthritis, on Tylenol.  8. Hyperlipidemia, on statin.  9. History of GI bleed, continue to monitor.  10. Fall risk, fall precautions.    Scribe Attestation  By signing my name below, IEleanor Scribe attest that this documentation has been prepared under the direction and in the presence of Hermann Regan MD.     All medical record entries made by the scribe were personally dictated by me I have reviewed the chart and agree the record accurately reflects my personal performance of his history physical examination and management

## 2025-02-09 ENCOUNTER — NURSING HOME VISIT (OUTPATIENT)
Dept: POST ACUTE CARE | Facility: EXTERNAL LOCATION | Age: 85
End: 2025-02-09
Payer: MEDICARE

## 2025-02-09 DIAGNOSIS — M19.90 OSTEOARTHRITIS, UNSPECIFIED OSTEOARTHRITIS TYPE, UNSPECIFIED SITE: ICD-10-CM

## 2025-02-09 DIAGNOSIS — J44.9 CHRONIC OBSTRUCTIVE PULMONARY DISEASE, UNSPECIFIED COPD TYPE (MULTI): Primary | ICD-10-CM

## 2025-02-09 DIAGNOSIS — F03.90 DEMENTIA, UNSPECIFIED DEMENTIA SEVERITY, UNSPECIFIED DEMENTIA TYPE, UNSPECIFIED WHETHER BEHAVIORAL, PSYCHOTIC, OR MOOD DISTURBANCE OR ANXIETY (MULTI): ICD-10-CM

## 2025-02-09 DIAGNOSIS — E78.5 HYPERLIPIDEMIA, UNSPECIFIED HYPERLIPIDEMIA TYPE: ICD-10-CM

## 2025-02-09 DIAGNOSIS — R53.1 WEAKNESS: ICD-10-CM

## 2025-02-09 DIAGNOSIS — D64.9 ANEMIA, UNSPECIFIED TYPE: ICD-10-CM

## 2025-02-09 DIAGNOSIS — I10 HYPERTENSION, UNSPECIFIED TYPE: ICD-10-CM

## 2025-02-09 DIAGNOSIS — Z91.81 AT RISK FOR FALLING: ICD-10-CM

## 2025-02-09 DIAGNOSIS — K92.2 GASTROINTESTINAL HEMORRHAGE, UNSPECIFIED GASTROINTESTINAL HEMORRHAGE TYPE: ICD-10-CM

## 2025-02-09 DIAGNOSIS — I25.10 ASHD (ARTERIOSCLEROTIC HEART DISEASE): ICD-10-CM

## 2025-02-09 PROCEDURE — 99308 SBSQ NF CARE LOW MDM 20: CPT | Performed by: INTERNAL MEDICINE

## 2025-02-09 NOTE — LETTER
Patient: Riya Hernandez  : 1940    Encounter Date: 2025    PLACE OF SERVICE:  [facility]    This is a subsequent visit.    Subjective  Patient ID: Riya Hernandez is a 84 y.o. female who presents for Follow-up.    Ms. Riya Hernandez is an 84-year-old female with history of dementia with COPD.  She has medical issues.  She is unable to care for herself.  She requires supportive care.    Review of Systems   Constitutional:  Negative for chills and fever.   Cardiovascular:  Negative for chest pain.   All other systems reviewed and are negative.    Objective  /82   Pulse 78   Temp 36.8 °C (98.2 °F)   Resp 18     Physical Exam  Vitals reviewed.   Constitutional:       Comments: This is a well-developed and well-nourished female, lying in bed, appearing weak and lethargic.   HENT:      Right Ear: Tympanic membrane, ear canal and external ear normal.      Left Ear: Tympanic membrane, ear canal and external ear normal.   Eyes:      General: No scleral icterus.     Pupils: Pupils are equal, round, and reactive to light.   Neck:      Vascular: No carotid bruit.   Cardiovascular:      Heart sounds: Normal heart sounds, S1 normal and S2 normal. No murmur heard.     No friction rub.   Pulmonary:      Effort: Pulmonary effort is normal.      Breath sounds: Decreased breath sounds (throughout.) present.   Abdominal:      Palpations: There is no hepatomegaly, splenomegaly or mass.   Musculoskeletal:         General: No swelling or deformity. Normal range of motion.      Cervical back: Neck supple.      Right lower leg: No edema.      Left lower leg: No edema.   Lymphadenopathy:      Cervical: No cervical adenopathy.      Upper Body:      Right upper body: No axillary adenopathy.      Left upper body: No axillary adenopathy.      Lower Body: No right inguinal adenopathy. No left inguinal adenopathy.   Neurological:      Cranial Nerves: Cranial nerves 2-12 are intact. No cranial nerve  deficit.      Sensory: No sensory deficit.      Motor: Motor function is intact. No weakness.      Gait: Gait is intact.      Deep Tendon Reflexes: Reflexes normal.      Comments: The patient is alert and oriented x2.   Psychiatric:         Mood and Affect: Mood normal. Mood is not anxious or depressed. Affect is not angry.         Behavior: Behavior is not agitated.         Thought Content: Thought content normal.         Judgment: Judgment normal.     LAB WORK:  Laboratory studies were reviewed.    Assessment/Plan  Problem List Items Addressed This Visit             ICD-10-CM       Cardiac and Vasculature    Hypertensive disorder I10    Hyperlipidemia E78.5       Gastrointestinal and Abdominal    Gastrointestinal hemorrhage, unspecified gastrointestinal hemorrhage type K92.2       Hematology and Neoplasia    Anemia D64.9       Neuro    Dementia F03.90     Other Visit Diagnoses         Codes    Chronic obstructive pulmonary disease, unspecified COPD type (Multi)    -  Primary J44.9    ASHD (arteriosclerotic heart disease)     I25.10    Osteoarthritis, unspecified osteoarthritis type, unspecified site     M19.90    Weakness     R53.1    At risk for falling     Z91.81        1. COPD, on bronchodilator therapy.  2. Dementia, unchanged.  3. History of GI bleed.  Continue to monitor.  4. Anemia.  Follow CBC.  5. ASHD, on aspirin.  6. Osteoarthritis, on Tylenol.  7. Hypertension, medically controlled.  8. Hyperlipidemia, on statin.  9. Weakness, on PT/OT.  10. Fall risk, on fall precautions.    Scribe Attestation  By signing my name below, IEleanor Scribe attest that this documentation has been prepared under the direction and in the presence of Hermann Regan MD.     All medical record entries made by the scribe were personally dictated by me I have reviewed the chart and agree the record accurately reflects my personal performance of his history physical examination and management      Electronically Signed By:  Hermann Regan MD   2/18/25  1:06 AM

## 2025-02-15 VITALS
HEART RATE: 78 BPM | TEMPERATURE: 98.2 F | RESPIRATION RATE: 18 BRPM | DIASTOLIC BLOOD PRESSURE: 82 MMHG | SYSTOLIC BLOOD PRESSURE: 120 MMHG

## 2025-02-15 ASSESSMENT — ENCOUNTER SYMPTOMS
FEVER: 0
CHILLS: 0

## 2025-05-03 ENCOUNTER — NURSING HOME VISIT (OUTPATIENT)
Dept: POST ACUTE CARE | Facility: EXTERNAL LOCATION | Age: 85
End: 2025-05-03
Payer: MEDICARE

## 2025-05-03 DIAGNOSIS — M19.90 OSTEOARTHRITIS, UNSPECIFIED OSTEOARTHRITIS TYPE, UNSPECIFIED SITE: ICD-10-CM

## 2025-05-03 DIAGNOSIS — R53.1 WEAKNESS: ICD-10-CM

## 2025-05-03 DIAGNOSIS — I25.10 ASHD (ARTERIOSCLEROTIC HEART DISEASE): ICD-10-CM

## 2025-05-03 DIAGNOSIS — F03.90 DEMENTIA, UNSPECIFIED DEMENTIA SEVERITY, UNSPECIFIED DEMENTIA TYPE, UNSPECIFIED WHETHER BEHAVIORAL, PSYCHOTIC, OR MOOD DISTURBANCE OR ANXIETY (MULTI): ICD-10-CM

## 2025-05-03 DIAGNOSIS — D64.9 ANEMIA, UNSPECIFIED TYPE: ICD-10-CM

## 2025-05-03 DIAGNOSIS — K92.2 GASTROINTESTINAL HEMORRHAGE, UNSPECIFIED GASTROINTESTINAL HEMORRHAGE TYPE: ICD-10-CM

## 2025-05-03 DIAGNOSIS — Z91.81 AT RISK FOR FALLING: ICD-10-CM

## 2025-05-03 DIAGNOSIS — J44.9 CHRONIC OBSTRUCTIVE PULMONARY DISEASE, UNSPECIFIED COPD TYPE (MULTI): Primary | ICD-10-CM

## 2025-05-03 DIAGNOSIS — I10 HYPERTENSION, UNSPECIFIED TYPE: ICD-10-CM

## 2025-05-03 DIAGNOSIS — E78.5 HYPERLIPIDEMIA, UNSPECIFIED HYPERLIPIDEMIA TYPE: ICD-10-CM

## 2025-05-03 PROCEDURE — 99308 SBSQ NF CARE LOW MDM 20: CPT | Performed by: INTERNAL MEDICINE

## 2025-05-03 NOTE — LETTER
Patient: Riya Hernandez  : 1940    Encounter Date: 2025    PLACE OF SERVICE:  [facility]    This is a subsequent visit.    Subjective  Patient ID: Riya Hernandez is a 84 y.o. female who presents for Follow-up.    Ms. Riya Hernandez is an 84-year-old female with history of COPD and dementia.  She has several medical issues.  She is unable to care for herself.  She requires supportive care.    Review of Systems   Constitutional:  Negative for chills and fever.   Cardiovascular:  Negative for chest pain.   All other systems reviewed and are negative.    Objective  /78   Pulse 76   Temp 36.7 °C (98.1 °F)   Resp 18     Physical Exam  Vitals reviewed.   Constitutional:       General: She is not in acute distress.     Comments: This is a well-developed and well-nourished female, sitting in a chair.   HENT:      Right Ear: Tympanic membrane, ear canal and external ear normal.      Left Ear: Tympanic membrane, ear canal and external ear normal.   Eyes:      General: No scleral icterus.     Pupils: Pupils are equal, round, and reactive to light.   Neck:      Vascular: No carotid bruit.   Cardiovascular:      Heart sounds: Normal heart sounds, S1 normal and S2 normal. No murmur heard.     No friction rub.   Pulmonary:      Breath sounds: Decreased breath sounds (throughout) present.   Abdominal:      Palpations: There is no hepatomegaly, splenomegaly or mass.   Musculoskeletal:         General: No swelling or deformity. Normal range of motion.      Cervical back: Neck supple.      Right lower leg: No edema.      Left lower leg: No edema.   Lymphadenopathy:      Cervical: No cervical adenopathy.      Upper Body:      Right upper body: No axillary adenopathy.      Left upper body: No axillary adenopathy.      Lower Body: No right inguinal adenopathy. No left inguinal adenopathy.   Neurological:      Mental Status: She is alert.      Cranial Nerves: Cranial nerves 2-12 are intact. No  cranial nerve deficit.      Sensory: No sensory deficit.      Motor: Motor function is intact. No weakness.      Gait: Gait is intact.      Deep Tendon Reflexes: Reflexes normal.      Comments: The patient is oriented x2.   Psychiatric:         Mood and Affect: Mood normal. Mood is not anxious or depressed. Affect is not angry.         Behavior: Behavior is not agitated.         Thought Content: Thought content normal.         Judgment: Judgment normal.     LAB WORK:  Laboratory studies were reviewed.    Assessment/Plan  Problem List Items Addressed This Visit           ICD-10-CM    Dementia F03.90    Hypertensive disorder I10    Hyperlipidemia E78.5    Anemia D64.9    Gastrointestinal hemorrhage, unspecified gastrointestinal hemorrhage type K92.2     Other Visit Diagnoses         Codes      Chronic obstructive pulmonary disease, unspecified COPD type (Multi)    -  Primary J44.9      ASHD (arteriosclerotic heart disease)     I25.10      Osteoarthritis, unspecified osteoarthritis type, unspecified site     M19.90      Weakness     R53.1      At risk for falling     Z91.81        1. COPD, on bronchodilator therapy.  2. Hypertension, medically controlled.  3. Anemia, follow CBC.  4. History of GI bleed, continue to monitor.  5. Dementia, unchanged.  6. ASHD, on aspirin.  7. Osteoarthritis, on Tylenol.  8. Hyperlipidemia, on statin.  9. Weakness, on PT/OT.  10. Fall risk, fall precautions.    Scribe Attestation  By signing my name below, IEleanor Scribe attest that this documentation has been prepared under the direction and in the presence of Hermann Regan MD.     All medical record entries made by the scribe were personally dictated by me I have reviewed the chart and agree the record accurately reflects my personal performance of his history physical examination and management      Electronically Signed By: Hermann Regan MD   5/6/25 11:42 PM

## 2025-05-05 VITALS
RESPIRATION RATE: 18 BRPM | SYSTOLIC BLOOD PRESSURE: 126 MMHG | DIASTOLIC BLOOD PRESSURE: 78 MMHG | HEART RATE: 76 BPM | TEMPERATURE: 98.1 F

## 2025-05-05 ASSESSMENT — ENCOUNTER SYMPTOMS
CHILLS: 0
FEVER: 0

## 2025-05-05 NOTE — PROGRESS NOTES
PLACE OF SERVICE:  [facility]    This is a subsequent visit.    Subjective   Patient ID: Riya Hernandez is a 84 y.o. female who presents for Follow-up.    Ms. Riya Hernandez is an 84-year-old female with history of COPD and dementia.  She has several medical issues.  She is unable to care for herself.  She requires supportive care.    Review of Systems   Constitutional:  Negative for chills and fever.   Cardiovascular:  Negative for chest pain.   All other systems reviewed and are negative.    Objective   /78   Pulse 76   Temp 36.7 °C (98.1 °F)   Resp 18     Physical Exam  Vitals reviewed.   Constitutional:       General: She is not in acute distress.     Comments: This is a well-developed and well-nourished female, sitting in a chair.   HENT:      Right Ear: Tympanic membrane, ear canal and external ear normal.      Left Ear: Tympanic membrane, ear canal and external ear normal.   Eyes:      General: No scleral icterus.     Pupils: Pupils are equal, round, and reactive to light.   Neck:      Vascular: No carotid bruit.   Cardiovascular:      Heart sounds: Normal heart sounds, S1 normal and S2 normal. No murmur heard.     No friction rub.   Pulmonary:      Breath sounds: Decreased breath sounds (throughout) present.   Abdominal:      Palpations: There is no hepatomegaly, splenomegaly or mass.   Musculoskeletal:         General: No swelling or deformity. Normal range of motion.      Cervical back: Neck supple.      Right lower leg: No edema.      Left lower leg: No edema.   Lymphadenopathy:      Cervical: No cervical adenopathy.      Upper Body:      Right upper body: No axillary adenopathy.      Left upper body: No axillary adenopathy.      Lower Body: No right inguinal adenopathy. No left inguinal adenopathy.   Neurological:      Mental Status: She is alert.      Cranial Nerves: Cranial nerves 2-12 are intact. No cranial nerve deficit.      Sensory: No sensory deficit.      Motor:  Motor function is intact. No weakness.      Gait: Gait is intact.      Deep Tendon Reflexes: Reflexes normal.      Comments: The patient is oriented x2.   Psychiatric:         Mood and Affect: Mood normal. Mood is not anxious or depressed. Affect is not angry.         Behavior: Behavior is not agitated.         Thought Content: Thought content normal.         Judgment: Judgment normal.     LAB WORK:  Laboratory studies were reviewed.    Assessment/Plan   Problem List Items Addressed This Visit           ICD-10-CM    Dementia F03.90    Hypertensive disorder I10    Hyperlipidemia E78.5    Anemia D64.9    Gastrointestinal hemorrhage, unspecified gastrointestinal hemorrhage type K92.2     Other Visit Diagnoses         Codes      Chronic obstructive pulmonary disease, unspecified COPD type (Multi)    -  Primary J44.9      ASHD (arteriosclerotic heart disease)     I25.10      Osteoarthritis, unspecified osteoarthritis type, unspecified site     M19.90      Weakness     R53.1      At risk for falling     Z91.81        1. COPD, on bronchodilator therapy.  2. Hypertension, medically controlled.  3. Anemia, follow CBC.  4. History of GI bleed, continue to monitor.  5. Dementia, unchanged.  6. ASHD, on aspirin.  7. Osteoarthritis, on Tylenol.  8. Hyperlipidemia, on statin.  9. Weakness, on PT/OT.  10. Fall risk, fall precautions.    Scribe Attestation  By signing my name below, IEleanor Scribe attest that this documentation has been prepared under the direction and in the presence of Hermann Regan MD.     All medical record entries made by the scribe were personally dictated by me I have reviewed the chart and agree the record accurately reflects my personal performance of his history physical examination and management

## 2025-06-01 ENCOUNTER — NURSING HOME VISIT (OUTPATIENT)
Dept: POST ACUTE CARE | Facility: EXTERNAL LOCATION | Age: 85
End: 2025-06-01
Payer: COMMERCIAL

## 2025-06-01 DIAGNOSIS — Z91.81 AT RISK FOR FALLING: ICD-10-CM

## 2025-06-01 DIAGNOSIS — J44.9 CHRONIC OBSTRUCTIVE PULMONARY DISEASE, UNSPECIFIED COPD TYPE (MULTI): Primary | ICD-10-CM

## 2025-06-01 DIAGNOSIS — D64.9 ANEMIA, UNSPECIFIED TYPE: ICD-10-CM

## 2025-06-01 DIAGNOSIS — I25.10 ASHD (ARTERIOSCLEROTIC HEART DISEASE): ICD-10-CM

## 2025-06-01 DIAGNOSIS — K92.2 GASTROINTESTINAL HEMORRHAGE, UNSPECIFIED GASTROINTESTINAL HEMORRHAGE TYPE: ICD-10-CM

## 2025-06-01 DIAGNOSIS — I10 HYPERTENSION, UNSPECIFIED TYPE: ICD-10-CM

## 2025-06-01 DIAGNOSIS — E78.5 HYPERLIPIDEMIA, UNSPECIFIED HYPERLIPIDEMIA TYPE: ICD-10-CM

## 2025-06-01 DIAGNOSIS — F03.90 DEMENTIA, UNSPECIFIED DEMENTIA SEVERITY, UNSPECIFIED DEMENTIA TYPE, UNSPECIFIED WHETHER BEHAVIORAL, PSYCHOTIC, OR MOOD DISTURBANCE OR ANXIETY (MULTI): ICD-10-CM

## 2025-06-01 DIAGNOSIS — M19.90 OSTEOARTHRITIS, UNSPECIFIED OSTEOARTHRITIS TYPE, UNSPECIFIED SITE: ICD-10-CM

## 2025-06-01 PROCEDURE — 99308 SBSQ NF CARE LOW MDM 20: CPT | Performed by: INTERNAL MEDICINE

## 2025-06-01 NOTE — LETTER
Patient: Riya Hernandez  : 1940    Encounter Date: 2025    PLACE OF SERVICE:  [facility].    This is a subsequent visit.    Subjective  Patient ID: Riya Hernandez is a 84 y.o. female who presents for Follow-up.    Ms. Riya Hernandez is an 84-year-old female with history of hypertension and COPD.  She is unable to care for herself and requires supportive care.    Review of Systems   Constitutional:  Negative for chills and fever.   Cardiovascular:  Negative for chest pain.   All other systems reviewed and are negative.    Objective  /82   Pulse 80   Temp 36.7 °C (98.1 °F)   Resp 18     Physical Exam  Vitals reviewed.   Constitutional:       General: She is not in acute distress.     Comments: This is a well-developed and well-nourished female, sitting in a chair.   HENT:      Right Ear: Tympanic membrane, ear canal and external ear normal.      Left Ear: Tympanic membrane, ear canal and external ear normal.   Eyes:      General: No scleral icterus.     Pupils: Pupils are equal, round, and reactive to light.   Neck:      Vascular: No carotid bruit.   Cardiovascular:      Heart sounds: Normal heart sounds, S1 normal and S2 normal. No murmur heard.     No friction rub.   Pulmonary:      Effort: Pulmonary effort is normal.      Breath sounds: Decreased breath sounds (throughout) present.   Abdominal:      Palpations: There is no hepatomegaly, splenomegaly or mass.   Musculoskeletal:         General: No swelling or deformity. Normal range of motion.      Cervical back: Neck supple.      Right lower leg: No edema.      Left lower leg: No edema.   Lymphadenopathy:      Cervical: No cervical adenopathy.      Upper Body:      Right upper body: No axillary adenopathy.      Left upper body: No axillary adenopathy.      Lower Body: No right inguinal adenopathy. No left inguinal adenopathy.   Neurological:      Mental Status: She is alert.      Cranial Nerves: Cranial nerves 2-12  are intact. No cranial nerve deficit.      Sensory: No sensory deficit.      Motor: Motor function is intact. No weakness.      Gait: Gait is intact.      Deep Tendon Reflexes: Reflexes normal.      Comments: The patient is oriented x2.   Psychiatric:         Mood and Affect: Mood normal. Mood is not anxious or depressed. Affect is not angry.         Behavior: Behavior is not agitated.         Thought Content: Thought content normal.         Judgment: Judgment normal.     LAB WORK:  Laboratory studies were reviewed.    Assessment/Plan  Problem List Items Addressed This Visit           ICD-10-CM       Cardiac and Vasculature    Hypertensive disorder I10    Hyperlipidemia E78.5       Gastrointestinal and Abdominal    Gastrointestinal hemorrhage, unspecified gastrointestinal hemorrhage type K92.2       Hematology and Neoplasia    Anemia D64.9       Neuro    Dementia F03.90     Other Visit Diagnoses         Codes      Chronic obstructive pulmonary disease, unspecified COPD type (Multi)    -  Primary J44.9      Osteoarthritis, unspecified osteoarthritis type, unspecified site     M19.90      ASHD (arteriosclerotic heart disease)     I25.10      At risk for falling     Z91.81        1. COPD, on bronchodilator therapy.  2. Dementia, unchanged.  3. History of GI bleed, continue to monitor.  4. Anemia, follow CBC.  5. Hypertension, med controlled.  6. Hyperlipidemia, on statin.  7. Osteoarthritis, on Tylenol.  8. ASHD, on aspirin.  9. Fall risk, on fall precautions.    Scribe Attestation  By signing my name below, IIsamar Scribe attest that this documentation has been prepared under the direction and in the presence of Hermann Regan MD.     All medical record entries made by the scribe were personally dictated by me I have reviewed the chart and agree the record accurately reflects my personal performance of his history physical examination and management      Electronically Signed By: Hermann Regan MD   6/8/25  11:08 PM

## 2025-06-06 VITALS
HEART RATE: 80 BPM | TEMPERATURE: 98.1 F | DIASTOLIC BLOOD PRESSURE: 82 MMHG | SYSTOLIC BLOOD PRESSURE: 126 MMHG | RESPIRATION RATE: 18 BRPM

## 2025-06-06 ASSESSMENT — ENCOUNTER SYMPTOMS
FEVER: 0
CHILLS: 0

## 2025-06-06 NOTE — PROGRESS NOTES
PLACE OF SERVICE:   and Rehab.    This is a subsequent visit.    Subjective   Patient ID: Riya Hernandez is a 84 y.o. female who presents for Follow-up.    Ms. Riya Hernandez is an 84-year-old female with history of hypertension and COPD.  She is unable to care for herself and requires supportive care.    Review of Systems   Constitutional:  Negative for chills and fever.   Cardiovascular:  Negative for chest pain.   All other systems reviewed and are negative.    Objective   /82   Pulse 80   Temp 36.7 °C (98.1 °F)   Resp 18     Physical Exam  Vitals reviewed.   Constitutional:       General: She is not in acute distress.     Comments: This is a well-developed and well-nourished female, sitting in a chair.   HENT:      Right Ear: Tympanic membrane, ear canal and external ear normal.      Left Ear: Tympanic membrane, ear canal and external ear normal.   Eyes:      General: No scleral icterus.     Pupils: Pupils are equal, round, and reactive to light.   Neck:      Vascular: No carotid bruit.   Cardiovascular:      Heart sounds: Normal heart sounds, S1 normal and S2 normal. No murmur heard.     No friction rub.   Pulmonary:      Effort: Pulmonary effort is normal.      Breath sounds: Decreased breath sounds (throughout) present.   Abdominal:      Palpations: There is no hepatomegaly, splenomegaly or mass.   Musculoskeletal:         General: No swelling or deformity. Normal range of motion.      Cervical back: Neck supple.      Right lower leg: No edema.      Left lower leg: No edema.   Lymphadenopathy:      Cervical: No cervical adenopathy.      Upper Body:      Right upper body: No axillary adenopathy.      Left upper body: No axillary adenopathy.      Lower Body: No right inguinal adenopathy. No left inguinal adenopathy.   Neurological:      Mental Status: She is alert.      Cranial Nerves: Cranial nerves 2-12 are intact. No cranial nerve deficit.      Sensory: No sensory deficit.       Motor: Motor function is intact. No weakness.      Gait: Gait is intact.      Deep Tendon Reflexes: Reflexes normal.      Comments: The patient is oriented x2.   Psychiatric:         Mood and Affect: Mood normal. Mood is not anxious or depressed. Affect is not angry.         Behavior: Behavior is not agitated.         Thought Content: Thought content normal.         Judgment: Judgment normal.     LAB WORK:  Laboratory studies were reviewed.    Assessment/Plan   Problem List Items Addressed This Visit           ICD-10-CM       Cardiac and Vasculature    Hypertensive disorder I10    Hyperlipidemia E78.5       Gastrointestinal and Abdominal    Gastrointestinal hemorrhage, unspecified gastrointestinal hemorrhage type K92.2       Hematology and Neoplasia    Anemia D64.9       Neuro    Dementia F03.90     Other Visit Diagnoses         Codes      Chronic obstructive pulmonary disease, unspecified COPD type (Multi)    -  Primary J44.9      Osteoarthritis, unspecified osteoarthritis type, unspecified site     M19.90      ASHD (arteriosclerotic heart disease)     I25.10      At risk for falling     Z91.81        1. COPD, on bronchodilator therapy.  2. Dementia, unchanged.  3. History of GI bleed, continue to monitor.  4. Anemia, follow CBC.  5. Hypertension, med controlled.  6. Hyperlipidemia, on statin.  7. Osteoarthritis, on Tylenol.  8. ASHD, on aspirin.  9. Fall risk, on fall precautions.    Scribe Attestation  By signing my name below, I, Estevan Reyes attest that this documentation has been prepared under the direction and in the presence of Hermann Regan MD.     All medical record entries made by the scribe were personally dictated by me I have reviewed the chart and agree the record accurately reflects my personal performance of his history physical examination and management

## 2025-07-06 ENCOUNTER — NURSING HOME VISIT (OUTPATIENT)
Dept: POST ACUTE CARE | Facility: EXTERNAL LOCATION | Age: 85
End: 2025-07-06
Payer: COMMERCIAL

## 2025-07-06 DIAGNOSIS — M19.90 OSTEOARTHRITIS, UNSPECIFIED OSTEOARTHRITIS TYPE, UNSPECIFIED SITE: ICD-10-CM

## 2025-07-06 DIAGNOSIS — F03.90 DEMENTIA, UNSPECIFIED DEMENTIA SEVERITY, UNSPECIFIED DEMENTIA TYPE, UNSPECIFIED WHETHER BEHAVIORAL, PSYCHOTIC, OR MOOD DISTURBANCE OR ANXIETY (MULTI): Primary | ICD-10-CM

## 2025-07-06 DIAGNOSIS — I10 HYPERTENSION, UNSPECIFIED TYPE: ICD-10-CM

## 2025-07-06 DIAGNOSIS — E78.5 HYPERLIPIDEMIA, UNSPECIFIED HYPERLIPIDEMIA TYPE: ICD-10-CM

## 2025-07-06 DIAGNOSIS — I25.10 ASHD (ARTERIOSCLEROTIC HEART DISEASE): ICD-10-CM

## 2025-07-06 DIAGNOSIS — Z87.19 HISTORY OF GI BLEED: ICD-10-CM

## 2025-07-06 DIAGNOSIS — D64.9 ANEMIA, UNSPECIFIED TYPE: ICD-10-CM

## 2025-07-06 DIAGNOSIS — Z91.81 AT RISK FOR FALLING: ICD-10-CM

## 2025-07-06 DIAGNOSIS — R53.1 WEAKNESS: ICD-10-CM

## 2025-07-06 DIAGNOSIS — J44.9 CHRONIC OBSTRUCTIVE PULMONARY DISEASE, UNSPECIFIED COPD TYPE (MULTI): ICD-10-CM

## 2025-07-06 NOTE — LETTER
Patient: Riya Hernandez  : 1940    Encounter Date: 2025    PLACE OF SERVICE:  [facility]    This is a subsequent visit.    Subjective  Patient ID: Riya Hernandez is a 84 y.o. female who presents for Follow-up.    Ms. Riya Hernandez is an 84-year-old female with history of dementia with COPD.  She is unable to care for herself and requires supportive care.    Review of Systems   Constitutional:  Negative for chills and fever.   Cardiovascular:  Negative for chest pain.   All other systems reviewed and are negative.    Objective  /80   Pulse 76   Temp 36.7 °C (98 °F)   Resp 18     Physical Exam  Vitals reviewed.   Constitutional:       General: She is not in acute distress.     Comments: This is a well-developed, well-nourished female, sitting in a chair.   HENT:      Right Ear: Tympanic membrane, ear canal and external ear normal.      Left Ear: Tympanic membrane, ear canal and external ear normal.   Eyes:      General: No scleral icterus.     Pupils: Pupils are equal, round, and reactive to light.   Neck:      Vascular: No carotid bruit.   Cardiovascular:      Heart sounds: Normal heart sounds, S1 normal and S2 normal. No murmur heard.     No friction rub.   Pulmonary:      Breath sounds: Decreased breath sounds (throughout) present.   Abdominal:      Palpations: There is no hepatomegaly, splenomegaly or mass.   Musculoskeletal:         General: No swelling or deformity. Normal range of motion.      Cervical back: Neck supple.      Right lower leg: No edema.      Left lower leg: No edema.   Lymphadenopathy:      Cervical: No cervical adenopathy.      Upper Body:      Right upper body: No axillary adenopathy.      Left upper body: No axillary adenopathy.      Lower Body: No right inguinal adenopathy. No left inguinal adenopathy.   Neurological:      Mental Status: She is alert.      Cranial Nerves: Cranial nerves 2-12 are intact. No cranial nerve deficit.      Sensory:  No sensory deficit.      Motor: Motor function is intact. No weakness.      Gait: Gait is intact.      Deep Tendon Reflexes: Reflexes normal.      Comments: The patient is oriented x2.   Psychiatric:         Mood and Affect: Mood normal. Mood is not anxious or depressed. Affect is not angry.         Behavior: Behavior is not agitated.         Thought Content: Thought content normal.         Judgment: Judgment normal.     LAB WORK: Laboratory studies reviewed.    Assessment/Plan  Problem List Items Addressed This Visit           ICD-10-CM       Cardiac and Vasculature    Hypertensive disorder I10    Hyperlipidemia E78.5       Hematology and Neoplasia    Anemia D64.9       Neuro    Dementia - Primary F03.90     Other Visit Diagnoses         Codes      Chronic obstructive pulmonary disease, unspecified COPD type (Multi)     J44.9      History of GI bleed     Z87.19      ASHD (arteriosclerotic heart disease)     I25.10      Osteoarthritis, unspecified osteoarthritis type, unspecified site     M19.90      Weakness     R53.1      At risk for falling     Z91.81        1. Dementia, unchanged.  2. COPD, on bronchodilator therapy.  3. Hypertension, med controlled.  4. Anemia, follow CBC.  5. History of GI bleed, continue to monitor.  6. ASHD, on aspirin.  7. Osteoarthritis, on Tylenol.  8. Hyperlipidemia, on statin.   9. Weakness, on PT/OT.  10. Fall risk, fall precautions.    Scribe Attestation  By signing my name below, I, Estevan Reyes attest that this documentation has been prepared under the direction and in the presence of Hermann Regan MD.     All medical record entries made by the scribe were personally dictated by me I have reviewed the chart and agree the record accurately reflects my personal performance of his history physical examination and management      Electronically Signed By: Hermann Regan MD   7/11/25 11:38 PM

## 2025-07-10 VITALS
HEART RATE: 76 BPM | RESPIRATION RATE: 18 BRPM | SYSTOLIC BLOOD PRESSURE: 126 MMHG | TEMPERATURE: 98 F | DIASTOLIC BLOOD PRESSURE: 80 MMHG

## 2025-07-10 ASSESSMENT — ENCOUNTER SYMPTOMS
CHILLS: 0
FEVER: 0

## 2025-07-10 NOTE — PROGRESS NOTES
PLACE OF SERVICE:  [facility]    This is a subsequent visit.    Subjective   Patient ID: Riya Hernandez is a 84 y.o. female who presents for Follow-up.    Ms. Riya Hernandez is an 84-year-old female with history of dementia with COPD.  She is unable to care for herself and requires supportive care.    Review of Systems   Constitutional:  Negative for chills and fever.   Cardiovascular:  Negative for chest pain.   All other systems reviewed and are negative.    Objective   /80   Pulse 76   Temp 36.7 °C (98 °F)   Resp 18     Physical Exam  Vitals reviewed.   Constitutional:       General: She is not in acute distress.     Comments: This is a well-developed, well-nourished female, sitting in a chair.   HENT:      Right Ear: Tympanic membrane, ear canal and external ear normal.      Left Ear: Tympanic membrane, ear canal and external ear normal.   Eyes:      General: No scleral icterus.     Pupils: Pupils are equal, round, and reactive to light.   Neck:      Vascular: No carotid bruit.   Cardiovascular:      Heart sounds: Normal heart sounds, S1 normal and S2 normal. No murmur heard.     No friction rub.   Pulmonary:      Breath sounds: Decreased breath sounds (throughout) present.   Abdominal:      Palpations: There is no hepatomegaly, splenomegaly or mass.   Musculoskeletal:         General: No swelling or deformity. Normal range of motion.      Cervical back: Neck supple.      Right lower leg: No edema.      Left lower leg: No edema.   Lymphadenopathy:      Cervical: No cervical adenopathy.      Upper Body:      Right upper body: No axillary adenopathy.      Left upper body: No axillary adenopathy.      Lower Body: No right inguinal adenopathy. No left inguinal adenopathy.   Neurological:      Mental Status: She is alert.      Cranial Nerves: Cranial nerves 2-12 are intact. No cranial nerve deficit.      Sensory: No sensory deficit.      Motor: Motor function is intact. No weakness.       Gait: Gait is intact.      Deep Tendon Reflexes: Reflexes normal.      Comments: The patient is oriented x2.   Psychiatric:         Mood and Affect: Mood normal. Mood is not anxious or depressed. Affect is not angry.         Behavior: Behavior is not agitated.         Thought Content: Thought content normal.         Judgment: Judgment normal.     LAB WORK: Laboratory studies reviewed.    Assessment/Plan   Problem List Items Addressed This Visit           ICD-10-CM       Cardiac and Vasculature    Hypertensive disorder I10    Hyperlipidemia E78.5       Hematology and Neoplasia    Anemia D64.9       Neuro    Dementia - Primary F03.90     Other Visit Diagnoses         Codes      Chronic obstructive pulmonary disease, unspecified COPD type (Multi)     J44.9      History of GI bleed     Z87.19      ASHD (arteriosclerotic heart disease)     I25.10      Osteoarthritis, unspecified osteoarthritis type, unspecified site     M19.90      Weakness     R53.1      At risk for falling     Z91.81        1. Dementia, unchanged.  2. COPD, on bronchodilator therapy.  3. Hypertension, med controlled.  4. Anemia, follow CBC.  5. History of GI bleed, continue to monitor.  6. ASHD, on aspirin.  7. Osteoarthritis, on Tylenol.  8. Hyperlipidemia, on statin.   9. Weakness, on PT/OT.  10. Fall risk, fall precautions.    Scribe Attestation  By signing my name below, I, Estevan Reyes attest that this documentation has been prepared under the direction and in the presence of Hermann Regan MD.     All medical record entries made by the scribe were personally dictated by me I have reviewed the chart and agree the record accurately reflects my personal performance of his history physical examination and management

## 2025-08-01 ENCOUNTER — NURSING HOME VISIT (OUTPATIENT)
Dept: POST ACUTE CARE | Facility: EXTERNAL LOCATION | Age: 85
End: 2025-08-01
Payer: COMMERCIAL

## 2025-08-01 DIAGNOSIS — J44.9 CHRONIC OBSTRUCTIVE PULMONARY DISEASE, UNSPECIFIED COPD TYPE (MULTI): Primary | ICD-10-CM

## 2025-08-01 DIAGNOSIS — D64.9 ANEMIA, UNSPECIFIED TYPE: ICD-10-CM

## 2025-08-01 DIAGNOSIS — F03.90 DEMENTIA, UNSPECIFIED DEMENTIA SEVERITY, UNSPECIFIED DEMENTIA TYPE, UNSPECIFIED WHETHER BEHAVIORAL, PSYCHOTIC, OR MOOD DISTURBANCE OR ANXIETY (MULTI): ICD-10-CM

## 2025-08-01 DIAGNOSIS — Z87.19 HISTORY OF GI BLEED: ICD-10-CM

## 2025-08-01 DIAGNOSIS — I25.10 ASHD (ARTERIOSCLEROTIC HEART DISEASE): ICD-10-CM

## 2025-08-01 DIAGNOSIS — I10 HYPERTENSION, UNSPECIFIED TYPE: ICD-10-CM

## 2025-08-01 DIAGNOSIS — M19.90 OSTEOARTHRITIS, UNSPECIFIED OSTEOARTHRITIS TYPE, UNSPECIFIED SITE: ICD-10-CM

## 2025-08-01 DIAGNOSIS — E78.5 HYPERLIPIDEMIA, UNSPECIFIED HYPERLIPIDEMIA TYPE: ICD-10-CM

## 2025-08-01 DIAGNOSIS — Z91.81 AT RISK FOR FALLING: ICD-10-CM

## 2025-08-07 ENCOUNTER — HOSPITAL ENCOUNTER (INPATIENT)
Facility: HOSPITAL | Age: 85
End: 2025-08-07
Attending: STUDENT IN AN ORGANIZED HEALTH CARE EDUCATION/TRAINING PROGRAM | Admitting: INTERNAL MEDICINE
Payer: MEDICARE

## 2025-08-07 ENCOUNTER — APPOINTMENT (OUTPATIENT)
Dept: CARDIOLOGY | Facility: HOSPITAL | Age: 85
DRG: 371 | End: 2025-08-07
Payer: MEDICARE

## 2025-08-07 ENCOUNTER — APPOINTMENT (OUTPATIENT)
Dept: RADIOLOGY | Facility: HOSPITAL | Age: 85
DRG: 371 | End: 2025-08-07
Payer: MEDICARE

## 2025-08-07 DIAGNOSIS — A04.72 C. DIFFICILE COLITIS: Primary | ICD-10-CM

## 2025-08-07 DIAGNOSIS — R19.7 DIARRHEA, UNSPECIFIED TYPE: Primary | ICD-10-CM

## 2025-08-07 DIAGNOSIS — E87.6 HYPOKALEMIA: ICD-10-CM

## 2025-08-07 DIAGNOSIS — K86.2 PANCREATIC CYST (HHS-HCC): ICD-10-CM

## 2025-08-07 LAB
ALBUMIN SERPL BCP-MCNC: 3.1 G/DL (ref 3.4–5)
ALP SERPL-CCNC: 137 U/L (ref 33–136)
ALT SERPL W P-5'-P-CCNC: 5 U/L (ref 7–45)
ANION GAP SERPL CALCULATED.3IONS-SCNC: 12 MMOL/L (ref 10–20)
AST SERPL W P-5'-P-CCNC: 14 U/L (ref 9–39)
BASOPHILS # BLD AUTO: 0.04 X10*3/UL (ref 0–0.1)
BASOPHILS NFR BLD AUTO: 0.3 %
BILIRUB SERPL-MCNC: 0.5 MG/DL (ref 0–1.2)
BUN SERPL-MCNC: 14 MG/DL (ref 6–23)
CALCIUM SERPL-MCNC: 8.6 MG/DL (ref 8.6–10.3)
CHLORIDE SERPL-SCNC: 108 MMOL/L (ref 98–107)
CO2 SERPL-SCNC: 28 MMOL/L (ref 21–32)
CREAT SERPL-MCNC: 1.02 MG/DL (ref 0.5–1.05)
EGFRCR SERPLBLD CKD-EPI 2021: 54 ML/MIN/1.73M*2
EOSINOPHIL # BLD AUTO: 0.14 X10*3/UL (ref 0–0.4)
EOSINOPHIL NFR BLD AUTO: 1.2 %
ERYTHROCYTE [DISTWIDTH] IN BLOOD BY AUTOMATED COUNT: 15.8 % (ref 11.5–14.5)
GLUCOSE SERPL-MCNC: 80 MG/DL (ref 74–99)
HCT VFR BLD AUTO: 39.9 % (ref 36–46)
HGB BLD-MCNC: 12.6 G/DL (ref 12–16)
IMM GRANULOCYTES # BLD AUTO: 0.05 X10*3/UL (ref 0–0.5)
IMM GRANULOCYTES NFR BLD AUTO: 0.4 % (ref 0–0.9)
LYMPHOCYTES # BLD AUTO: 2.04 X10*3/UL (ref 0.8–3)
LYMPHOCYTES NFR BLD AUTO: 17.4 %
MAGNESIUM SERPL-MCNC: 1.84 MG/DL (ref 1.6–2.4)
MCH RBC QN AUTO: 29 PG (ref 26–34)
MCHC RBC AUTO-ENTMCNC: 31.6 G/DL (ref 32–36)
MCV RBC AUTO: 92 FL (ref 80–100)
MONOCYTES # BLD AUTO: 0.83 X10*3/UL (ref 0.05–0.8)
MONOCYTES NFR BLD AUTO: 7.1 %
NEUTROPHILS # BLD AUTO: 8.63 X10*3/UL (ref 1.6–5.5)
NEUTROPHILS NFR BLD AUTO: 73.6 %
NRBC BLD-RTO: 0 /100 WBCS (ref 0–0)
PLATELET # BLD AUTO: 251 X10*3/UL (ref 150–450)
POTASSIUM SERPL-SCNC: 2.5 MMOL/L (ref 3.5–5.3)
PROT SERPL-MCNC: 6.1 G/DL (ref 6.4–8.2)
RBC # BLD AUTO: 4.34 X10*6/UL (ref 4–5.2)
SODIUM SERPL-SCNC: 145 MMOL/L (ref 136–145)
WBC # BLD AUTO: 11.7 X10*3/UL (ref 4.4–11.3)

## 2025-08-07 PROCEDURE — 2500000004 HC RX 250 GENERAL PHARMACY W/ HCPCS (ALT 636 FOR OP/ED): Performed by: PHYSICIAN ASSISTANT

## 2025-08-07 PROCEDURE — 99285 EMERGENCY DEPT VISIT HI MDM: CPT | Mod: 25 | Performed by: STUDENT IN AN ORGANIZED HEALTH CARE EDUCATION/TRAINING PROGRAM

## 2025-08-07 PROCEDURE — 85025 COMPLETE CBC W/AUTO DIFF WBC: CPT | Performed by: PHYSICIAN ASSISTANT

## 2025-08-07 PROCEDURE — 74177 CT ABD & PELVIS W/CONTRAST: CPT | Mod: FOREIGN READ | Performed by: STUDENT IN AN ORGANIZED HEALTH CARE EDUCATION/TRAINING PROGRAM

## 2025-08-07 PROCEDURE — 2500000001 HC RX 250 WO HCPCS SELF ADMINISTERED DRUGS (ALT 637 FOR MEDICARE OP): Performed by: PHYSICIAN ASSISTANT

## 2025-08-07 PROCEDURE — 36415 COLL VENOUS BLD VENIPUNCTURE: CPT | Performed by: PHYSICIAN ASSISTANT

## 2025-08-07 PROCEDURE — 2550000001 HC RX 255 CONTRASTS: Performed by: PHYSICIAN ASSISTANT

## 2025-08-07 PROCEDURE — 74177 CT ABD & PELVIS W/CONTRAST: CPT

## 2025-08-07 PROCEDURE — 93005 ELECTROCARDIOGRAM TRACING: CPT

## 2025-08-07 PROCEDURE — 83690 ASSAY OF LIPASE: CPT | Performed by: PHYSICIAN ASSISTANT

## 2025-08-07 PROCEDURE — 71045 X-RAY EXAM CHEST 1 VIEW: CPT | Mod: FOREIGN READ | Performed by: RADIOLOGY

## 2025-08-07 PROCEDURE — 93010 ELECTROCARDIOGRAM REPORT: CPT | Performed by: INTERNAL MEDICINE

## 2025-08-07 PROCEDURE — 2500000002 HC RX 250 W HCPCS SELF ADMINISTERED DRUGS (ALT 637 FOR MEDICARE OP, ALT 636 FOR OP/ED): Performed by: PHYSICIAN ASSISTANT

## 2025-08-07 PROCEDURE — 80053 COMPREHEN METABOLIC PANEL: CPT | Performed by: PHYSICIAN ASSISTANT

## 2025-08-07 PROCEDURE — 96366 THER/PROPH/DIAG IV INF ADDON: CPT

## 2025-08-07 PROCEDURE — 96365 THER/PROPH/DIAG IV INF INIT: CPT

## 2025-08-07 PROCEDURE — 71045 X-RAY EXAM CHEST 1 VIEW: CPT

## 2025-08-07 PROCEDURE — 83735 ASSAY OF MAGNESIUM: CPT | Performed by: PHYSICIAN ASSISTANT

## 2025-08-07 RX ORDER — POTASSIUM CHLORIDE 20 MEQ/1
40 TABLET, EXTENDED RELEASE ORAL ONCE
Status: COMPLETED | OUTPATIENT
Start: 2025-08-07 | End: 2025-08-07

## 2025-08-07 RX ORDER — POTASSIUM CHLORIDE 14.9 MG/ML
20 INJECTION INTRAVENOUS ONCE
Status: COMPLETED | OUTPATIENT
Start: 2025-08-07 | End: 2025-08-07

## 2025-08-07 RX ORDER — ACETAMINOPHEN 500 MG
1000 TABLET ORAL ONCE
Status: COMPLETED | OUTPATIENT
Start: 2025-08-07 | End: 2025-08-07

## 2025-08-07 RX ADMIN — IOHEXOL 75 ML: 350 INJECTION, SOLUTION INTRAVENOUS at 22:25

## 2025-08-07 RX ADMIN — SODIUM CHLORIDE 500 ML: 900 INJECTION, SOLUTION INTRAVENOUS at 20:59

## 2025-08-07 RX ADMIN — POTASSIUM CHLORIDE 20 MEQ: 14.9 INJECTION, SOLUTION INTRAVENOUS at 20:59

## 2025-08-07 RX ADMIN — POTASSIUM CHLORIDE EXTENDED-RELEASE 40 MEQ: 1500 TABLET ORAL at 20:59

## 2025-08-07 RX ADMIN — ACETAMINOPHEN 1000 MG: 500 TABLET ORAL at 19:58

## 2025-08-07 ASSESSMENT — LIFESTYLE VARIABLES
EVER FELT BAD OR GUILTY ABOUT YOUR DRINKING: NO
EVER HAD A DRINK FIRST THING IN THE MORNING TO STEADY YOUR NERVES TO GET RID OF A HANGOVER: NO
TOTAL SCORE: 0
HAVE YOU EVER FELT YOU SHOULD CUT DOWN ON YOUR DRINKING: NO
HAVE PEOPLE ANNOYED YOU BY CRITICIZING YOUR DRINKING: NO

## 2025-08-07 ASSESSMENT — PAIN - FUNCTIONAL ASSESSMENT: PAIN_FUNCTIONAL_ASSESSMENT: 0-10

## 2025-08-07 ASSESSMENT — PAIN SCALES - GENERAL
PAINLEVEL_OUTOF10: 0 - NO PAIN
PAINLEVEL_OUTOF10: 0 - NO PAIN

## 2025-08-08 PROBLEM — R19.7 DIARRHEA, UNSPECIFIED TYPE: Status: ACTIVE | Noted: 2025-08-08

## 2025-08-08 LAB
ANION GAP SERPL CALCULATED.3IONS-SCNC: 11 MMOL/L (ref 10–20)
ANION GAP SERPL CALCULATED.3IONS-SCNC: 9 MMOL/L (ref 10–20)
APPEARANCE UR: ABNORMAL
BACTERIA #/AREA URNS AUTO: ABNORMAL /HPF
BILIRUB UR STRIP.AUTO-MCNC: NEGATIVE MG/DL
BUN SERPL-MCNC: 12 MG/DL (ref 6–23)
BUN SERPL-MCNC: 14 MG/DL (ref 6–23)
C DIF TOX TCDA+TCDB STL QL NAA+PROBE: DETECTED
CALCIUM SERPL-MCNC: 8.2 MG/DL (ref 8.6–10.3)
CALCIUM SERPL-MCNC: 8.3 MG/DL (ref 8.6–10.3)
CHLORIDE SERPL-SCNC: 110 MMOL/L (ref 98–107)
CHLORIDE SERPL-SCNC: 110 MMOL/L (ref 98–107)
CHLORIDE UR-SCNC: 58 MMOL/L
CHLORIDE/CREATININE (MMOL/G) IN URINE: 57 MMOL/G CREAT (ref 38–318)
CO2 SERPL-SCNC: 26 MMOL/L (ref 21–32)
CO2 SERPL-SCNC: 28 MMOL/L (ref 21–32)
COLOR UR: YELLOW
CREAT SERPL-MCNC: 1.06 MG/DL (ref 0.5–1.05)
CREAT SERPL-MCNC: 1.1 MG/DL (ref 0.5–1.05)
CREAT UR-MCNC: 101.2 MG/DL (ref 20–320)
CREAT UR-MCNC: 101.3 MG/DL (ref 20–320)
CREAT UR-MCNC: 101.3 MG/DL (ref 20–320)
EGFRCR SERPLBLD CKD-EPI 2021: 49 ML/MIN/1.73M*2
EGFRCR SERPLBLD CKD-EPI 2021: 51 ML/MIN/1.73M*2
GLUCOSE SERPL-MCNC: 142 MG/DL (ref 74–99)
GLUCOSE SERPL-MCNC: 78 MG/DL (ref 74–99)
GLUCOSE UR STRIP.AUTO-MCNC: NORMAL MG/DL
HOLD SPECIMEN: NORMAL
HYALINE CASTS #/AREA URNS AUTO: ABNORMAL /LPF
KETONES UR STRIP.AUTO-MCNC: NEGATIVE MG/DL
LEUKOCYTE ESTERASE UR QL STRIP.AUTO: ABNORMAL
LIPASE SERPL-CCNC: 30 U/L (ref 9–82)
MAGNESIUM SERPL-MCNC: 1.87 MG/DL (ref 1.6–2.4)
MUCOUS THREADS #/AREA URNS AUTO: ABNORMAL /LPF
NITRITE UR QL STRIP.AUTO: NEGATIVE
PH UR STRIP.AUTO: 5.5 [PH]
POTASSIUM SERPL-SCNC: 2.5 MMOL/L (ref 3.5–5.3)
POTASSIUM SERPL-SCNC: 2.9 MMOL/L (ref 3.5–5.3)
POTASSIUM SERPL-SCNC: 3 MMOL/L (ref 3.5–5.3)
POTASSIUM UR-SCNC: 16 MMOL/L
POTASSIUM/CREAT UR-RTO: 16 MMOL/G CREAT
PROT UR STRIP.AUTO-MCNC: ABNORMAL MG/DL
RBC # UR STRIP.AUTO: ABNORMAL MG/DL
RBC #/AREA URNS AUTO: ABNORMAL /HPF
SODIUM SERPL-SCNC: 144 MMOL/L (ref 136–145)
SODIUM SERPL-SCNC: 144 MMOL/L (ref 136–145)
SODIUM UR-SCNC: 30 MMOL/L
SODIUM/CREAT UR-RTO: 30 MMOL/G CREAT
SP GR UR STRIP.AUTO: 1.03
SQUAMOUS #/AREA URNS AUTO: ABNORMAL /HPF
UROBILINOGEN UR STRIP.AUTO-MCNC: NORMAL MG/DL
WBC #/AREA URNS AUTO: >50 /HPF
WBC CLUMPS #/AREA URNS AUTO: ABNORMAL /HPF

## 2025-08-08 PROCEDURE — 81001 URINALYSIS AUTO W/SCOPE: CPT | Performed by: PHYSICIAN ASSISTANT

## 2025-08-08 PROCEDURE — 36415 COLL VENOUS BLD VENIPUNCTURE: CPT | Performed by: PHYSICIAN ASSISTANT

## 2025-08-08 PROCEDURE — 84133 ASSAY OF URINE POTASSIUM: CPT | Performed by: INTERNAL MEDICINE

## 2025-08-08 PROCEDURE — 87086 URINE CULTURE/COLONY COUNT: CPT | Mod: WESLAB | Performed by: PHYSICIAN ASSISTANT

## 2025-08-08 PROCEDURE — 84132 ASSAY OF SERUM POTASSIUM: CPT | Performed by: INTERNAL MEDICINE

## 2025-08-08 PROCEDURE — 36415 COLL VENOUS BLD VENIPUNCTURE: CPT | Performed by: INTERNAL MEDICINE

## 2025-08-08 PROCEDURE — 2500000005 HC RX 250 GENERAL PHARMACY W/O HCPCS: Performed by: INTERNAL MEDICINE

## 2025-08-08 PROCEDURE — 87324 CLOSTRIDIUM AG IA: CPT | Mod: WESLAB | Performed by: PHYSICIAN ASSISTANT

## 2025-08-08 PROCEDURE — 2500000004 HC RX 250 GENERAL PHARMACY W/ HCPCS (ALT 636 FOR OP/ED): Performed by: INTERNAL MEDICINE

## 2025-08-08 PROCEDURE — 2500000001 HC RX 250 WO HCPCS SELF ADMINISTERED DRUGS (ALT 637 FOR MEDICARE OP): Performed by: INTERNAL MEDICINE

## 2025-08-08 PROCEDURE — 87493 C DIFF AMPLIFIED PROBE: CPT | Performed by: PHYSICIAN ASSISTANT

## 2025-08-08 PROCEDURE — 87506 IADNA-DNA/RNA PROBE TQ 6-11: CPT | Mod: WESLAB | Performed by: PHYSICIAN ASSISTANT

## 2025-08-08 PROCEDURE — 80048 BASIC METABOLIC PNL TOTAL CA: CPT | Performed by: INTERNAL MEDICINE

## 2025-08-08 PROCEDURE — 83735 ASSAY OF MAGNESIUM: CPT | Performed by: INTERNAL MEDICINE

## 2025-08-08 PROCEDURE — 80048 BASIC METABOLIC PNL TOTAL CA: CPT | Performed by: PHYSICIAN ASSISTANT

## 2025-08-08 PROCEDURE — 2500000001 HC RX 250 WO HCPCS SELF ADMINISTERED DRUGS (ALT 637 FOR MEDICARE OP): Performed by: NURSE PRACTITIONER

## 2025-08-08 PROCEDURE — 84300 ASSAY OF URINE SODIUM: CPT | Performed by: INTERNAL MEDICINE

## 2025-08-08 PROCEDURE — 2500000002 HC RX 250 W HCPCS SELF ADMINISTERED DRUGS (ALT 637 FOR MEDICARE OP, ALT 636 FOR OP/ED): Performed by: INTERNAL MEDICINE

## 2025-08-08 PROCEDURE — 1200000002 HC GENERAL ROOM WITH TELEMETRY DAILY

## 2025-08-08 PROCEDURE — 82436 ASSAY OF URINE CHLORIDE: CPT | Performed by: INTERNAL MEDICINE

## 2025-08-08 RX ORDER — ALBUTEROL SULFATE 0.83 MG/ML
2.5 SOLUTION RESPIRATORY (INHALATION) EVERY 6 HOURS PRN
Status: ACTIVE | OUTPATIENT
Start: 2025-08-08

## 2025-08-08 RX ORDER — POTASSIUM CHLORIDE 1.5 G/1.58G
40 POWDER, FOR SOLUTION ORAL ONCE
Status: COMPLETED | OUTPATIENT
Start: 2025-08-08 | End: 2025-08-08

## 2025-08-08 RX ORDER — METOPROLOL SUCCINATE 25 MG/1
25 TABLET, EXTENDED RELEASE ORAL DAILY
Status: DISPENSED | OUTPATIENT
Start: 2025-08-08

## 2025-08-08 RX ORDER — MEMANTINE HYDROCHLORIDE 5 MG/1
5 TABLET ORAL DAILY
Status: DISPENSED | OUTPATIENT
Start: 2025-08-08

## 2025-08-08 RX ORDER — TALC
3 POWDER (GRAM) TOPICAL NIGHTLY PRN
Status: DISPENSED | OUTPATIENT
Start: 2025-08-08

## 2025-08-08 RX ORDER — ACETAMINOPHEN 325 MG/1
650 TABLET ORAL EVERY 4 HOURS PRN
Status: DISPENSED | OUTPATIENT
Start: 2025-08-08

## 2025-08-08 RX ORDER — ONDANSETRON 4 MG/1
4 TABLET, ORALLY DISINTEGRATING ORAL EVERY 8 HOURS PRN
Status: ACTIVE | OUTPATIENT
Start: 2025-08-08

## 2025-08-08 RX ORDER — DONEPEZIL HYDROCHLORIDE 10 MG/1
10 TABLET, FILM COATED ORAL NIGHTLY
Status: DISPENSED | OUTPATIENT
Start: 2025-08-08

## 2025-08-08 RX ORDER — ATORVASTATIN CALCIUM 80 MG/1
80 TABLET, FILM COATED ORAL NIGHTLY
Status: DISPENSED | OUTPATIENT
Start: 2025-08-08

## 2025-08-08 RX ORDER — VANCOMYCIN HYDROCHLORIDE 125 MG/1
250 CAPSULE ORAL 4 TIMES DAILY
Status: DISPENSED | OUTPATIENT
Start: 2025-08-08

## 2025-08-08 RX ORDER — HYDROXYZINE HYDROCHLORIDE 25 MG/1
25 TABLET, FILM COATED ORAL EVERY 6 HOURS PRN
Status: DISPENSED | OUTPATIENT
Start: 2025-08-08

## 2025-08-08 RX ORDER — GUAIFENESIN/DEXTROMETHORPHAN 100-10MG/5
5 SYRUP ORAL EVERY 4 HOURS PRN
Status: ACTIVE | OUTPATIENT
Start: 2025-08-08

## 2025-08-08 RX ORDER — POTASSIUM CHLORIDE 14.9 MG/ML
20 INJECTION INTRAVENOUS
Status: COMPLETED | OUTPATIENT
Start: 2025-08-08 | End: 2025-08-08

## 2025-08-08 RX ORDER — ONDANSETRON HYDROCHLORIDE 2 MG/ML
4 INJECTION, SOLUTION INTRAVENOUS EVERY 8 HOURS PRN
Status: DISPENSED | OUTPATIENT
Start: 2025-08-08

## 2025-08-08 RX ORDER — POTASSIUM CHLORIDE 1.5 G/1.58G
40 POWDER, FOR SOLUTION ORAL EVERY 4 HOURS
Status: COMPLETED | OUTPATIENT
Start: 2025-08-08 | End: 2025-08-09

## 2025-08-08 RX ORDER — PANTOPRAZOLE SODIUM 40 MG/1
40 TABLET, DELAYED RELEASE ORAL
Status: DISPENSED | OUTPATIENT
Start: 2025-08-08

## 2025-08-08 RX ADMIN — VANCOMYCIN HYDROCHLORIDE 250 MG: 125 CAPSULE ORAL at 16:07

## 2025-08-08 RX ADMIN — Medication 3 MG: at 21:37

## 2025-08-08 RX ADMIN — POTASSIUM CHLORIDE 20 MEQ: 14.9 INJECTION, SOLUTION INTRAVENOUS at 14:44

## 2025-08-08 RX ADMIN — ATORVASTATIN CALCIUM 80 MG: 80 TABLET, FILM COATED ORAL at 21:37

## 2025-08-08 RX ADMIN — MEMANTINE 5 MG: 5 TABLET ORAL at 08:22

## 2025-08-08 RX ADMIN — POTASSIUM CHLORIDE 20 MEQ: 14.9 INJECTION, SOLUTION INTRAVENOUS at 12:32

## 2025-08-08 RX ADMIN — ACETAMINOPHEN 650 MG: 325 TABLET ORAL at 10:59

## 2025-08-08 RX ADMIN — ONDANSETRON HYDROCHLORIDE 4 MG: 2 SOLUTION INTRAMUSCULAR; INTRAVENOUS at 21:49

## 2025-08-08 RX ADMIN — METOPROLOL SUCCINATE 25 MG: 25 TABLET, EXTENDED RELEASE ORAL at 08:22

## 2025-08-08 RX ADMIN — VANCOMYCIN HYDROCHLORIDE 250 MG: 125 CAPSULE ORAL at 21:37

## 2025-08-08 RX ADMIN — POTASSIUM CHLORIDE 40 MEQ: 1.5 POWDER, FOR SOLUTION ORAL at 18:57

## 2025-08-08 RX ADMIN — POTASSIUM CHLORIDE 40 MEQ: 1.5 POWDER, FOR SOLUTION ORAL at 21:37

## 2025-08-08 RX ADMIN — PANTOPRAZOLE SODIUM 40 MG: 40 TABLET, DELAYED RELEASE ORAL at 08:22

## 2025-08-08 RX ADMIN — DONEPEZIL HYDROCHLORIDE 10 MG: 10 TABLET, FILM COATED ORAL at 21:37

## 2025-08-08 RX ADMIN — HYDROXYZINE HYDROCHLORIDE 25 MG: 25 TABLET ORAL at 16:07

## 2025-08-08 SDOH — HEALTH STABILITY: MENTAL HEALTH: HOW MANY DRINKS CONTAINING ALCOHOL DO YOU HAVE ON A TYPICAL DAY WHEN YOU ARE DRINKING?: PATIENT UNABLE TO ANSWER

## 2025-08-08 SDOH — ECONOMIC STABILITY: HOUSING INSECURITY: IN THE PAST 12 MONTHS, HOW MANY TIMES HAVE YOU MOVED WHERE YOU WERE LIVING?: 0

## 2025-08-08 SDOH — ECONOMIC STABILITY: HOUSING INSECURITY
IN THE LAST 12 MONTHS, WAS THERE A TIME WHEN YOU WERE NOT ABLE TO PAY THE MORTGAGE OR RENT ON TIME?: PATIENT UNABLE TO ANSWER

## 2025-08-08 SDOH — SOCIAL STABILITY: SOCIAL INSECURITY
WITHIN THE LAST YEAR, HAVE YOU BEEN RAPED OR FORCED TO HAVE ANY KIND OF SEXUAL ACTIVITY BY YOUR PARTNER OR EX-PARTNER?: PATIENT UNABLE TO ANSWER

## 2025-08-08 SDOH — SOCIAL STABILITY: SOCIAL INSECURITY
WITHIN THE LAST YEAR, HAVE YOU BEEN KICKED, HIT, SLAPPED, OR OTHERWISE PHYSICALLY HURT BY YOUR PARTNER OR EX-PARTNER?: PATIENT UNABLE TO ANSWER

## 2025-08-08 SDOH — SOCIAL STABILITY: SOCIAL INSECURITY
WITHIN THE LAST YEAR, HAVE YOU BEEN HUMILIATED OR EMOTIONALLY ABUSED IN OTHER WAYS BY YOUR PARTNER OR EX-PARTNER?: PATIENT UNABLE TO ANSWER

## 2025-08-08 SDOH — HEALTH STABILITY: MENTAL HEALTH: HOW OFTEN DO YOU HAVE SIX OR MORE DRINKS ON ONE OCCASION?: PATIENT UNABLE TO ANSWER

## 2025-08-08 SDOH — ECONOMIC STABILITY: TRANSPORTATION INSECURITY
IN THE PAST 12 MONTHS, HAS LACK OF TRANSPORTATION KEPT YOU FROM MEDICAL APPOINTMENTS OR FROM GETTING MEDICATIONS?: PATIENT UNABLE TO ANSWER

## 2025-08-08 SDOH — SOCIAL STABILITY: SOCIAL INSECURITY: WITHIN THE LAST YEAR, HAVE YOU BEEN AFRAID OF YOUR PARTNER OR EX-PARTNER?: PATIENT UNABLE TO ANSWER

## 2025-08-08 SDOH — ECONOMIC STABILITY: FOOD INSECURITY
WITHIN THE PAST 12 MONTHS, YOU WORRIED THAT YOUR FOOD WOULD RUN OUT BEFORE YOU GOT THE MONEY TO BUY MORE.: PATIENT UNABLE TO ANSWER

## 2025-08-08 SDOH — ECONOMIC STABILITY: INCOME INSECURITY
IN THE PAST 12 MONTHS HAS THE ELECTRIC, GAS, OIL, OR WATER COMPANY THREATENED TO SHUT OFF SERVICES IN YOUR HOME?: PATIENT UNABLE TO ANSWER

## 2025-08-08 SDOH — ECONOMIC STABILITY: HOUSING INSECURITY: AT ANY TIME IN THE PAST 12 MONTHS, WERE YOU HOMELESS OR LIVING IN A SHELTER (INCLUDING NOW)?: PATIENT UNABLE TO ANSWER

## 2025-08-08 SDOH — HEALTH STABILITY: MENTAL HEALTH: HOW OFTEN DO YOU HAVE A DRINK CONTAINING ALCOHOL?: PATIENT UNABLE TO ANSWER

## 2025-08-08 SDOH — ECONOMIC STABILITY: FOOD INSECURITY
HOW HARD IS IT FOR YOU TO PAY FOR THE VERY BASICS LIKE FOOD, HOUSING, MEDICAL CARE, AND HEATING?: PATIENT UNABLE TO ANSWER

## 2025-08-08 SDOH — SOCIAL STABILITY: SOCIAL INSECURITY: WERE YOU ABLE TO COMPLETE ALL THE BEHAVIORAL HEALTH SCREENINGS?: NO

## 2025-08-08 SDOH — ECONOMIC STABILITY: FOOD INSECURITY
WITHIN THE PAST 12 MONTHS, THE FOOD YOU BOUGHT JUST DIDN'T LAST AND YOU DIDN'T HAVE MONEY TO GET MORE.: PATIENT UNABLE TO ANSWER

## 2025-08-08 ASSESSMENT — COGNITIVE AND FUNCTIONAL STATUS - GENERAL
MOBILITY SCORE: 12
WALKING IN HOSPITAL ROOM: A LOT
PATIENT BASELINE BEDBOUND: NO
TOILETING: TOTAL
STANDING UP FROM CHAIR USING ARMS: A LOT
DAILY ACTIVITIY SCORE: 12
DRESSING REGULAR UPPER BODY CLOTHING: TOTAL
TURNING FROM BACK TO SIDE WHILE IN FLAT BAD: A LOT
DAILY ACTIVITIY SCORE: 9
STANDING UP FROM CHAIR USING ARMS: A LOT
EATING MEALS: A LOT
CLIMB 3 TO 5 STEPS WITH RAILING: TOTAL
PERSONAL GROOMING: TOTAL
DRESSING REGULAR UPPER BODY CLOTHING: A LOT
PERSONAL GROOMING: A LOT
MOBILITY SCORE: 11
DRESSING REGULAR LOWER BODY CLOTHING: A LOT
DRESSING REGULAR LOWER BODY CLOTHING: TOTAL
MOVING FROM LYING ON BACK TO SITTING ON SIDE OF FLAT BED WITH BEDRAILS: A LOT
HELP NEEDED FOR BATHING: TOTAL
TOILETING: A LOT
WALKING IN HOSPITAL ROOM: A LOT
HELP NEEDED FOR BATHING: A LOT
TURNING FROM BACK TO SIDE WHILE IN FLAT BAD: A LOT
MOVING TO AND FROM BED TO CHAIR: A LOT
MOVING TO AND FROM BED TO CHAIR: A LOT
MOVING FROM LYING ON BACK TO SITTING ON SIDE OF FLAT BED WITH BEDRAILS: A LOT
CLIMB 3 TO 5 STEPS WITH RAILING: A LOT

## 2025-08-08 ASSESSMENT — ACTIVITIES OF DAILY LIVING (ADL)
LACK_OF_TRANSPORTATION: PATIENT UNABLE TO ANSWER
TOILETING: DEPENDENT
JUDGMENT_ADEQUATE_SAFELY_COMPLETE_DAILY_ACTIVITIES: UNABLE TO ASSESS
FEEDING YOURSELF: DEPENDENT
GROOMING: DEPENDENT
PATIENT'S MEMORY ADEQUATE TO SAFELY COMPLETE DAILY ACTIVITIES?: UNABLE TO ASSESS
HEARING - RIGHT EAR: FUNCTIONAL
WALKS IN HOME: NEEDS ASSISTANCE
DRESSING YOURSELF: UNABLE TO ASSESS
ASSISTIVE_DEVICE: WALKER
HEARING - LEFT EAR: FUNCTIONAL
ADEQUATE_TO_COMPLETE_ADL: UNABLE TO ASSESS
BATHING: DEPENDENT

## 2025-08-08 ASSESSMENT — ENCOUNTER SYMPTOMS
DIARRHEA: 1
EYES NEGATIVE: 1
RESPIRATORY NEGATIVE: 1
WEAKNESS: 1
APPETITE CHANGE: 1
ACTIVITY CHANGE: 1
ALLERGIC/IMMUNOLOGIC NEGATIVE: 1
CARDIOVASCULAR NEGATIVE: 1
ENDOCRINE NEGATIVE: 1
FATIGUE: 1
HEMATOLOGIC/LYMPHATIC NEGATIVE: 1

## 2025-08-08 ASSESSMENT — PAIN SCALES - GENERAL
PAINLEVEL_OUTOF10: 0 - NO PAIN
PAINLEVEL_OUTOF10: 3

## 2025-08-08 ASSESSMENT — PAIN - FUNCTIONAL ASSESSMENT: PAIN_FUNCTIONAL_ASSESSMENT: 0-10

## 2025-08-08 ASSESSMENT — LIFESTYLE VARIABLES
SKIP TO QUESTIONS 9-10: 0
AUDIT-C TOTAL SCORE: -1

## 2025-08-08 NOTE — PROGRESS NOTES
Patient was received in signout at start of shift.    IN BRIEF   84-year-old female presented with abnormal labs.    Patient reportedly had a low potassium.  Plan for admission to private hospitalist pending results of BMP.       ED COURSE   BMP resulted with a potassium of 3.  Patient at this time is stable for admission to regular nursing 4.  Admission order placed.    FINAL IMPRESSION      1. Diarrhea, unspecified type    2. Hypokalemia    3. Pancreatic cyst ([facility])          DISPOSITION    Admit 08/08/2025 02:12:38 AM

## 2025-08-08 NOTE — CONSULTS
"Nutrition Initial Assessment:   Nutrition Assessment    Reason for Assessment: Admission nursing screening    Patient is a 84 y.o. female presenting with abnormal labs - low potassium. Noted pt code status is comfort measures only.  Medical History[1]      Patient was assessed virtually.    Nutrition History:  Food and Nutrient History: Pt documented as disoriented to place, time and situation; not appropriate for phone interview. Pt residing in LTC facility, so presume receiving 3 meals/day. Per RN, pt tolerating clear liquids fine with plans to advance diet.       Anthropometrics:  Height: 157.5 cm (5' 2\")   Weight: 63.5 kg (140 lb)   BMI (Calculated): 25.6  IBW/kg (Dietitian Calculated): 50 kg  Percent of IBW: 127 %                      Weight History:   Wt Readings from Last 10 Encounters:   08/07/25 63.5 kg (140 lb)   09/24/24 63.5 kg (140 lb)   07/05/24 64.1 kg (141 lb 6.4 oz)   12/19/23 62.6 kg (138 lb)   10/10/23 67.1 kg (148 lb)   04/12/23 64.9 kg (143 lb)       Weight Change %:  Weight History / % Weight Change: Stated weight only this admission so unable to assess for weight changes.    Nutrition Focused Physical Exam Findings:    Subcutaneous Fat Loss:   Defer Subcutaneous Fat Loss Assessment: Defer all  Defer All Reason: remote assessment  Muscle Wasting:  Defer Muscle Wasting Assessment: Defer all  Defer All Reason: remote assessment  Edema:  Edema: none  Physical Findings:  Skin: Negative  Digestive System Findings: Diarrhea    Nutrition Significant Labs:  BMP Trend:   Results from last 7 days   Lab Units 08/08/25  1050 08/08/25  0142 08/07/25  1959   GLUCOSE mg/dL 142* 78 80   CALCIUM mg/dL 8.2* 8.3* 8.6   SODIUM mmol/L 144 144 145   POTASSIUM mmol/L 2.5* 3.0* 2.5*   CO2 mmol/L 26 28 28   CHLORIDE mmol/L 110* 110* 108*   BUN mg/dL 12 14 14   CREATININE mg/dL 1.06* 1.10* 1.02    , A1C:No results found for: \"HGBA1C\", Vit D: No results found for: \"VITD25\"     Nutrition Specific Medications:  Scheduled " medications  Scheduled Medications[2]  Continuous medications  Continuous Medications[3]  PRN medications  PRN Medications[4]      I/O:   Last BM Date: 08/08/25; Stool Appearance: Loose (08/08/25 0900)    Dietary Orders (From admission, onward)       Start     Ordered    08/08/25 0653  Adult diet Clear Liquid  Diet effective now        Question:  Diet type  Answer:  Clear Liquid    08/08/25 0652    08/08/25 0627  May Participate in Room Service  ( ROOM SERVICE MAY PARTICIPATE)  Once        Question:  .  Answer:  Yes    08/08/25 0626                     Estimated Needs:   Total Energy Estimated Needs in 24 hours (kCal): 1500 kCal  Method for Estimating Needs: 30 kcal/kg IBW  Total Protein Estimated Needs in 24 Hours (g): 60 g  Method for Estimating 24 Hour Protein Needs: 1.2 g/kg IBW  Total Fluid Estimated Needs in 24 Hours (mL): 1500 mL  Method for Estimating 24 Hour Fluid Needs: 1 ml/kcal or per provider        Nutrition Diagnosis   Malnutrition Diagnosis  Patient has Malnutrition Diagnosis: No    Nutrition Diagnosis  Patient has Nutrition Diagnosis: No (No indication of nutrition diagnosis at this time)       Nutrition Interventions/Recommendations   Nutrition prescription for oral nutrition    Nutrition Recommendations:  Individualized Nutrition Prescription Provided for : Advance diet as medically feasible. If po intakes consistently <50% of meals, consider addition of Ensure Plus High Protein BID.    Nutrition Interventions/Goals:   Meals and Snacks: General healthful diet  Goal: Advance diet beyond clear liquids within 5 days  Coordination of Care with Providers: Nursing      Education Documentation  No documentation found.            Nutrition Monitoring and Evaluation   Intake / Amount of food: Consumes at least 50% or more of meals/snacks/supplements    Body Weight: Body weight - Maintain stable weight              Goal Status: New goal(s) identified    Time Spent (min): 45 minutes            [1]   Past  Medical History:  Diagnosis Date    Alzheimer disease (Multi)     AMI (acute myocardial infarction) (Multi)     Anemia     Anxiety     Arrhythmia     ASHD (arteriosclerotic heart disease)     At risk for falling     Chest pain     COPD (chronic obstructive pulmonary disease) (Multi)     Coronary artery disease     Delirium     Delirium     Dementia     GI bleed     Hyperlipidemia     Hypertension     Lightheadedness     Myocardial infarction (Multi)     Near syncope     Osteoarthritis     Rhabdomyolysis     Syncope     Urinary tract infection     Weakness    [2] atorvastatin, 80 mg, oral, Nightly  donepezil, 10 mg, oral, Nightly  memantine, 5 mg, oral, Daily  metoprolol succinate XL, 25 mg, oral, Daily  pantoprazole, 40 mg, oral, Daily before breakfast  potassium chloride, 20 mEq, intravenous, q2h  vancomycin, 250 mg, oral, 4x daily     [3]    [4] PRN medications: acetaminophen, albuterol, benzocaine-menthol, dextromethorphan-guaifenesin, hydrOXYzine HCL, melatonin, ondansetron, ondansetron ODT

## 2025-08-08 NOTE — CARE PLAN
The patient's goals for the shift include remain safe and comfortable    The clinical goals for the shift include monitor labs    Problem: Pain - Adult  Goal: Verbalizes/displays adequate comfort level or baseline comfort level  Outcome: Progressing     Problem: Safety - Adult  Goal: Free from fall injury  Outcome: Progressing     Problem: Discharge Planning  Goal: Discharge to home or other facility with appropriate resources  Outcome: Progressing     Problem: Chronic Conditions and Co-morbidities  Goal: Patient's chronic conditions and co-morbidity symptoms are monitored and maintained or improved  Outcome: Progressing     Problem: Nutrition  Goal: Nutrient intake appropriate for maintaining nutritional needs  Outcome: Progressing     Problem: Skin  Goal: Decreased wound size/increased tissue granulation at next dressing change  Outcome: Progressing  Goal: Participates in plan/prevention/treatment measures  Outcome: Progressing  Flowsheets (Taken 8/8/2025 1020)  Participates in plan/prevention/treatment measures: Elevate heels  Goal: Prevent/manage excess moisture  Outcome: Progressing  Goal: Prevent/minimize sheer/friction injuries  Outcome: Progressing  Goal: Promote/optimize nutrition  Outcome: Progressing  Goal: Promote skin healing  Outcome: Progressing

## 2025-08-08 NOTE — ED PROVIDER NOTES
HPI   Chief Complaint   Patient presents with    abnormal labs        HPI  84-year-old female presenting to the emergency room for evaluation of hypokalemia.  Patient does reside at skilled nursing facility.  Per her daughter who is at bedside the patient has had a functional decline in the last month or so.  She has not been eating and drinking as much.  She has been sleeping much more.  She has also had 2 episodes of watery diarrhea for the last several weeks.  The patient denies any abdominal pain, chest pain or shortness of breath.  She denies fevers, chills, night sweats.    Please see HPI for pertinent positive and negative ROS.     Patient History   Medical History[1]  Surgical History[2]  Family History[3]  Social History[4]    Physical Exam   ED Triage Vitals [08/07/25 1822]   Temperature Heart Rate Respirations BP   37.2 °C (99 °F) 59 18 109/67      Pulse Ox Temp Source Heart Rate Source Patient Position   97 % Temporal Monitor --      BP Location FiO2 (%)     -- --       Physical Exam  GENERAL APPEARANCE: Awake and alert. No acute respiratory distress.   VITAL SIGNS: As per the nurses' triage record.  HEENT: Normocephalic, atraumatic.   NECK: Soft, nontender and supple  CHEST: Nontender to palpation. Clear to auscultation bilaterally. Symmetric rise and fall of chest wall.   HEART: Clear S1 and S2. Regular rate and rhythm. Strong and equal pulses in the extremities.  ABDOMEN: Soft, nontender, nondistended  RECTAL: No evidence of grossly blood per rectum  MUSCULOSKELETAL: Full gross active range of motion. Ambulating on own with no acute difficulties  NEUROLOGICAL: Awake, alert and oriented x 3. Motor power intact in the upper and lower extremities. Sensation is intact to light touch in the upper and lower extremities. Patient answering questions appropriately.   IMMUNOLOGICAL: No lymphatic streaking noted  DERMATOLOGIC: Warm and dry   PYSCH: Cooperative with appropriate mood and affect.    ED Course & MDM    ED Course as of 08/08/25 0048   Thu Aug 07, 2025   2041 Twelve-lead EKG notes sinus bradycardia 57 beats minute.  Normal intervals.  Nonspecific ST-T wave abnormality but no elevations or depressions. [ML]      ED Course User Index  [ML] Marty R Lejeune, DO         Diagnoses as of 08/08/25 0048   Diarrhea, unspecified type   Hypokalemia   Pancreatic cyst ([facility])                 No data recorded     B[place] Coma Scale Score: 15 (08/07/25 1820 : Ivis Avendano RN)                           Medical Decision Making  Parts of this chart have been completed using voice recognition software. Please excuse any errors of transcription.  My thought process and reason for plan has been formulated from the time that I saw the patient until the time of disposition and is not specific to one specific moment during their visit and furthermore my MDM encompasses this entire chart and not only this text box.      HPI: Detailed above.    Exam: A medically appropriate exam performed, outlined above, given the known history and presentation.    History obtained from: Patient    EKG: See my supervising physicians EKG interpretation    Medications given during visit:  Medications   acetaminophen (Tylenol) tablet 1,000 mg (1,000 mg oral Given 8/7/25 1958)   potassium chloride CR (Klor-Con M20) ER tablet 40 mEq (40 mEq oral Given 8/7/25 2059)   potassium chloride 20 mEq in sterile water for injection 100 mL (0 mEq intravenous Stopped 8/7/25 2305)   sodium chloride 0.9 % bolus 500 mL (0 mL intravenous Stopped 8/7/25 2305)   iohexol (OMNIPaque) 350 mg iodine/mL solution 75 mL (75 mL intravenous Given 8/7/25 2225)        Diagnostic/tests  Labs Reviewed   CBC WITH AUTO DIFFERENTIAL - Abnormal       Result Value    WBC 11.7 (*)     nRBC 0.0      RBC 4.34      Hemoglobin 12.6      Hematocrit 39.9      MCV 92      MCH 29.0      MCHC 31.6 (*)     RDW 15.8 (*)     Platelets 251      Neutrophils % 73.6      Immature Granulocytes %, Automated  0.4      Lymphocytes % 17.4      Monocytes % 7.1      Eosinophils % 1.2      Basophils % 0.3      Neutrophils Absolute 8.63 (*)     Immature Granulocytes Absolute, Automated 0.05      Lymphocytes Absolute 2.04      Monocytes Absolute 0.83 (*)     Eosinophils Absolute 0.14      Basophils Absolute 0.04     COMPREHENSIVE METABOLIC PANEL - Abnormal    Glucose 80      Sodium 145      Potassium 2.5 (*)     Chloride 108 (*)     Bicarbonate 28      Anion Gap 12      Urea Nitrogen 14      Creatinine 1.02      eGFR 54 (*)     Calcium 8.6      Albumin 3.1 (*)     Alkaline Phosphatase 137 (*)     Total Protein 6.1 (*)     AST 14      Bilirubin, Total 0.5      ALT 5 (*)    MAGNESIUM - Normal    Magnesium 1.84     LIPASE - Normal    Lipase 30      Narrative:     Venipuncture immediately after or during the administration of Metamizole may lead to falsely low results. Testing should be performed immediately prior to Metamizole dosing.   C. DIFFICILE, PCR   STOOL PATHOGEN PANEL, PCR   URINALYSIS WITH REFLEX CULTURE AND MICROSCOPIC    Narrative:     The following orders were created for panel order Urinalysis with Reflex Culture and Microscopic.  Procedure                               Abnormality         Status                     ---------                               -----------         ------                     Urinalysis with Reflex C...[[id number]]                                                 Extra Urine Gray Tube[[id number]]                                                         Please view results for these tests on the individual orders.   URINALYSIS WITH REFLEX CULTURE AND MICROSCOPIC   EXTRA URINE GRAY TUBE      CT abdomen pelvis w IV contrast   Final Result   1.Mild/moderate compression deformity of the L4 vertebral body.    Correlate with point tenderness.   2.Numerous pancreatic tail cystic lesions including multiple   clusters, for example measuring 1.6 cm. consider further evaluation   with MRI/MRCP.   3.A 1.0 cm  calculus in the right renal pelvis. No hydronephrosis   bilaterally.   Signed by Tapan Morales      XR chest 1 view   Final Result   No acute pulmonary pathology.   Signed by Joby Miranda MD           Considerations/further MDM:    Differential diagnosis includes was not limited to electrolyte sermons versus dehydration versus gastritis versus gastroenteritis versus colitis/infectious diarrhea    Chest x-ray unremarkable.  CMP does show a hypokalemia with a potassium of 2.5.  Patient was given both IV potassium at 20 mEq and oral potassium 40 mEq.  Urinalysis is pending at time of admission.  CBC shows a mild leukocytosis.  CT abdomen pelvis with IV contrast shows mild to moderate compression deformity of L4.  Pancreatic tail cystic lesions.  Chest x-ray unremarkable.  Patient was also given IV fluids and oral Tylenol.  At time of admission, her C. difficile and stool pathogen panel are pending.  She will be admitted for further evaluation of diarrhea and hypokalemia.  The patient was admitted in stable condition to accepting physician, Dr. Snyder.       Procedure  Procedures       [1]   Past Medical History:  Diagnosis Date    Alzheimer disease (Multi)     AMI (acute myocardial infarction) (Multi)     Anemia     Anxiety     Arrhythmia     ASHD (arteriosclerotic heart disease)     At risk for falling     Chest pain     COPD (chronic obstructive pulmonary disease) (Multi)     Coronary artery disease     Delirium     Delirium     Dementia     GI bleed     Hyperlipidemia     Hypertension     Lightheadedness     Myocardial infarction (Multi)     Near syncope     Osteoarthritis     Rhabdomyolysis     Syncope     Urinary tract infection     Weakness    [2]   Past Surgical History:  Procedure Laterality Date    CARDIAC CATHETERIZATION      CORONARY STENT PLACEMENT     [3]   Family History  Problem Relation Name Age of Onset    Heart disease Mother      Other (triple bypass) Mother      Heart disease Brother      Sudden  death Brother          cardiac death    Hypertension Other     [4]   Social History  Tobacco Use    Smoking status: Every Day     Current packs/day: 0.25     Average packs/day: 0.3 packs/day for 10.0 years (2.5 ttl pk-yrs)     Types: Cigarettes     Passive exposure: Never    Smokeless tobacco: Never   Substance Use Topics    Alcohol use: Never    Drug use: Never        Misty Pak PA-C  08/10/25 1148

## 2025-08-08 NOTE — H&P
History Of Present Illness  Riya Hernandez is a 84 y.o. female presenting with diarrhea, weak unable to walk, c diff colitis low potassium.     Past Medical History  She has a past medical history of Alzheimer disease (Multi), AMI (acute myocardial infarction) (Multi), Anemia, Anxiety, Arrhythmia, ASHD (arteriosclerotic heart disease), At risk for falling, Chest pain, COPD (chronic obstructive pulmonary disease) (Multi), Coronary artery disease, Delirium, Delirium, Dementia, GI bleed, Hyperlipidemia, Hypertension, Lightheadedness, Myocardial infarction (Multi), Near syncope, Osteoarthritis, Rhabdomyolysis, Syncope, Urinary tract infection, and Weakness.    Surgical History  She has a past surgical history that includes Cardiac catheterization and Coronary stent placement.     Social History  She reports that she has been smoking cigarettes. She has a 2.5 pack-year smoking history. She has never been exposed to tobacco smoke. She has never used smokeless tobacco. Alcohol use questions deferred to the physician. She reports that she does not use drugs.    Family History  Family History[1]     Allergies  Patient has no known allergies.    Review of Systems   Constitutional:  Positive for activity change, appetite change and fatigue.   HENT: Negative.     Eyes: Negative.    Respiratory: Negative.     Cardiovascular: Negative.    Gastrointestinal:  Positive for diarrhea.   Endocrine: Negative.    Genitourinary: Negative.    Musculoskeletal:  Positive for gait problem.   Skin: Negative.    Allergic/Immunologic: Negative.    Neurological:  Positive for weakness.   Hematological: Negative.    Psychiatric/Behavioral:          Sleepy        Physical Exam  Constitutional:       Appearance: She is ill-appearing.   HENT:      Head: Normocephalic and atraumatic.      Nose: Nose normal.      Mouth/Throat:      Mouth: Mucous membranes are moist.     Eyes:      Extraocular Movements: Extraocular movements intact.       Conjunctiva/sclera: Conjunctivae normal.      Pupils: Pupils are equal, round, and reactive to light.       Cardiovascular:      Pulses: Normal pulses.      Heart sounds: Normal heart sounds.   Pulmonary:      Effort: Pulmonary effort is normal.   Abdominal:      General: There is distension.      Palpations: Abdomen is soft.     Musculoskeletal:      Cervical back: Normal range of motion and neck supple.      Comments: Weak ,unable to walk     Neurological:      General: No focal deficit present.      Mental Status: She is alert. Mental status is at baseline.     Psychiatric:         Mood and Affect: Mood normal.          Last Recorded Vitals  /61 (BP Location: Right arm, Patient Position: Lying)   Pulse 57   Temp 36.5 °C (97.7 °F) (Oral)   Resp 18   Wt 63.5 kg (140 lb)   SpO2 97%     Relevant Results           Assessment/Plan   Assessment & Plan  Diarrhea, unspecified type      C diff colitis       Hypokalemia persistent hx hypokalemia 10 years ago    Zuly Snyder MD         [1]   Family History  Problem Relation Name Age of Onset    Heart disease Mother      Other (triple bypass) Mother      Heart disease Brother      Sudden death Brother          cardiac death    Hypertension Other

## 2025-08-08 NOTE — CONSULTS
Inpatient consult to Infectious Diseases  Consult performed by: Pete Wilson MD  Consult ordered by: Zuly Snyder MD            Primary MD: Trini Regan MD    Reason For Consult  C. difficile infection    History Of Present Illness  Riya Hernandez is a 84 y.o. female presenting with abnormal labs.  She has history of dementia, and is a nursing home resident.  She is unable to provide any comprehensive history, history was obtained from her daughter.  She is reported to have diarrhea for the last couple of weeks.  She was found to be hypokalemic.  Her stool tested positive for C. difficile  She is on oral vancomycin.       Past Medical History  She has a past medical history of Alzheimer disease (Multi), AMI (acute myocardial infarction) (Multi), Anemia, Anxiety, Arrhythmia, ASHD (arteriosclerotic heart disease), At risk for falling, Chest pain, COPD (chronic obstructive pulmonary disease) (Multi), Coronary artery disease, Delirium, Delirium, Dementia, GI bleed, Hyperlipidemia, Hypertension, Lightheadedness, Myocardial infarction (Multi), Near syncope, Osteoarthritis, Rhabdomyolysis, Syncope, Urinary tract infection, and Weakness.    Surgical History  She has a past surgical history that includes Cardiac catheterization and Coronary stent placement.     Social History     Occupational History    Not on file   Tobacco Use    Smoking status: Every Day     Current packs/day: 0.25     Average packs/day: 0.3 packs/day for 10.0 years (2.5 ttl pk-yrs)     Types: Cigarettes     Passive exposure: Never    Smokeless tobacco: Never   Substance and Sexual Activity    Alcohol use: Defer    Drug use: Never    Sexual activity: Not Currently     Travel History   Travel since 07/08/25    No documented travel since 07/08/25           Family History  Family History[1]  Allergies  Patient has no known allergies.     Immunization History   Administered Date(s) Administered    COVID-19, mRNA, LNP-S, PF, 30 mcg/0.3 mL dose  "03/13/2021, 04/10/2021, 11/24/2021    Flu vaccine (IIV4), preservative free *Check age/dose* 09/17/2020    Flu vaccine, quadrivalent, high-dose, preservative free, age 65y+ (FLUZONE) 12/22/2021, 10/09/2022    Influenza, Seasonal, Quadrivalent, Adjuvanted 11/10/2023    Influenza, Unspecified 12/15/2003    Influenza, seasonal, injectable 10/03/2013, 10/22/2014    Pfizer COVID-19 vaccine, 12 years and older, (30mcg/0.3mL) (Comirnaty) 11/10/2023    Pfizer COVID-19 vaccine, bivalent, age 12 years and older (30 mcg/0.3 mL) 10/09/2022    Pfizer Gray Cap SARS-CoV-2 07/08/2022    Pneumococcal conjugate vaccine, 13-valent (PREVNAR 13) 06/01/2015    Pneumococcal polysaccharide vaccine, 23-valent, age 2 years and older (PNEUMOVAX 23) 11/01/2013    Tdap vaccine, age 7 year and older (BOOSTRIX, ADACEL) 01/02/2020, 07/05/2024     Medications  Home medications:  Prescriptions Prior to Admission[2]  Current medications:  Scheduled medications  Scheduled Medications[3]  Continuous medications  Continuous Medications[4]  PRN medications  PRN Medications[5]    Review of Systems   Unable to perform ROS: Dementia        Objective  Range of Vitals (last 24 hours)  Heart Rate:  [56-62]   Temp:  [36.4 °C (97.5 °F)-37.2 °C (99 °F)]   Resp:  [18]   BP: (109-150)/(52-69)   Height:  [157.5 cm (5' 2\")]   Weight:  [63.5 kg (140 lb)]   SpO2:  [94 %-99 %]        Daily Weight  08/07/25 : 63.5 kg (140 lb)    Body mass index is 25.61 kg/m².     Physical Exam  Constitutional:       General: She is awake.   HENT:      Head: Normocephalic and atraumatic.      Right Ear: External ear normal.      Left Ear: External ear normal.      Nose: Nose normal.     Eyes:      General: No scleral icterus.     Extraocular Movements: Extraocular movements intact.      Conjunctiva/sclera: Conjunctivae normal.       Cardiovascular:      Rate and Rhythm: Normal rate and regular rhythm.      Heart sounds: Normal heart sounds.   Pulmonary:      Breath sounds: Normal breath " "sounds.   Abdominal:      Palpations: Abdomen is soft.      Tenderness: There is no abdominal tenderness.     Musculoskeletal:      Cervical back: Normal range of motion and neck supple.      Right lower leg: No edema.      Left lower leg: No edema.     Skin:     General: Skin is warm and dry.     Neurological:      Mental Status: She is confused.     Psychiatric:         Behavior: Behavior is not agitated.        Relevant Results  Outside Hospital Results    Labs  Results from last 72 hours   Lab Units 08/07/25  1904   WBC AUTO x10*3/uL 11.7*   HEMOGLOBIN g/dL 12.6   HEMATOCRIT % 39.9   PLATELETS AUTO x10*3/uL 251   NEUTROS PCT AUTO % 73.6   LYMPHS PCT AUTO % 17.4   MONOS PCT AUTO % 7.1   EOS PCT AUTO % 1.2     Results from last 72 hours   Lab Units 08/08/25  1050 08/08/25  0142 08/07/25 1959   SODIUM mmol/L 144 144 145   POTASSIUM mmol/L 2.5* 3.0* 2.5*   CHLORIDE mmol/L 110* 110* 108*   CO2 mmol/L 26 28 28   BUN mg/dL 12 14 14   CREATININE mg/dL 1.06* 1.10* 1.02   GLUCOSE mg/dL 142* 78 80   CALCIUM mg/dL 8.2* 8.3* 8.6   ANION GAP mmol/L 11 9* 12   EGFR mL/min/1.73m*2 51* 49* 54*     Results from last 72 hours   Lab Units 08/07/25 1959   ALK PHOS U/L 137*   BILIRUBIN TOTAL mg/dL 0.5   PROTEIN TOTAL g/dL 6.1*   ALT U/L 5*   AST U/L 14   ALBUMIN g/dL 3.1*     Estimated Creatinine Clearance: 33.4 mL/min (A) (by C-G formula based on SCr of 1.06 mg/dL (H)).  No results found for: \"CRP\", \"SEDRATE\"  No results found for: \"HIV1X2\", \"HIVCONF\", \"KNNYCC0CD\"  No results found for: \"HEPCABINIT\", \"HEPCAB\", \"HCVPCRQUANT\"  Microbiology  Reviewed-urine culture pending  Imaging  ECG 12 lead  Result Date: 8/8/2025  Sinus bradycardia Possible Right ventricular hypertrophy Nonspecific ST abnormality Abnormal ECG When compared with ECG of 05-JUL-2024 11:36, T wave inversion no longer evident in Inferior leads T wave inversion less evident in Anterior leads Nonspecific T wave abnormality, worse in Lateral leads QT has lengthened    CT " abdomen pelvis w IV contrast  Result Date: 8/7/2025  STUDY: CT Abdomen and Pelvis with IV Contrast; 08/07/2025 10:34 PM INDICATION: Diarrhea. COMPARISON: None. ACCESSION NUMBER(S): [id number] ORDERING CLINICIAN: GLEN GUERRA TECHNIQUE: CT of the abdomen and pelvis was performed.  Contiguous axial images were obtained at 3 mm slice thickness through the abdomen and pelvis. Coronal and sagittal reconstructions at 3 mm slice thickness were performed.  75 mL Omnipaque-350 was administered intravenously.  670 DICOM images received. FINDINGS: LOWER CHEST: No cardiomegaly.  No pericardial effusion.  Lung bases are clear.  ABDOMEN:  LIVER: No hepatomegaly.  Smooth surface contour.  Normal attenuation.  BILE DUCTS: No intrahepatic or extrahepatic biliary ductal dilatation.  GALLBLADDER: The gallbladder is unremarkable. STOMACH: No abnormalities identified.  PANCREAS: Numerous pancreatic tail cystic lesions including multiple clusters, for example measuring 1.6 cm (series 3 image 35).  SPLEEN: No splenomegaly or focal splenic lesion.  ADRENAL GLANDS: No thickening or nodules.  KIDNEYS AND URETERS: 1.0 cm calculus in the right renal pelvis.  Left renal cortical scarring.  No hydronephrosis bilaterally.  PELVIS:  BLADDER: No abnormalities identified.  REPRODUCTIVE ORGANS: No abnormalities identified.  BOWEL: No abnormalities identified.  VESSELS: Abdominal aorta is normal in caliber.  Atherosclerotic vascular calcifications.  PERITONEUM/RETROPERITONEUM/LYMPH NODES: No free fluid.  No pneumoperitoneum.  No lymphadenopathy.  ABDOMINAL WALL: No abnormalities identified. SOFT TISSUES: No abnormalities identified.  BONES: Mild/moderate compression deformity of the L4 vertebral body. Multilevel degenerative changes.  Osteopenia.    1.Mild/moderate compression deformity of the L4 vertebral body. Correlate with point tenderness. 2.Numerous pancreatic tail cystic lesions including multiple clusters, for example measuring 1.6 cm.  consider further evaluation with MRI/MRCP. 3.A 1.0 cm calculus in the right renal pelvis. No hydronephrosis bilaterally. Signed by Tapan Morales    XR chest 1 view  Result Date: 8/7/2025  STUDY: Chest Radiograph;  08/07/2025 at 9:37 PM INDICATION: Weakness. COMPARISON: None available. ACCESSION NUMBER(S): [id number] ORDERING CLINICIAN: GLEN GUERRA TECHNIQUE:  Frontal chest was obtained at 2137 hours. FINDINGS: CARDIOMEDIASTINAL SILHOUETTE: Cardiomediastinal silhouette is normal in size and configuration.  LUNGS: Lungs are clear.  ABDOMEN: No remarkable upper abdominal findings.  BONES: No acute osseous changes.    No acute pulmonary pathology. Signed by Joby Miranda MD     Assessment/Plan   Encephalopathy  Pyuria versus urinary tract infection  Dementia  Clostridium difficile infection-positive PCR, EIA pending    Continue oral vancomycin  Follow-up urine culture  Monitor stool  Follow-up pending workup  Contact plus precautions  Supportive care  Monitor temperature and WBC    This is a complex infectious disease issue and the following was performed today (for more details please see the above note): Management decisions reflecting the added complexity (e.g., changes in antimicrobial therapy, infection control strategies).       Pete Wilson MD         [1]   Family History  Problem Relation Name Age of Onset    Heart disease Mother      Other (triple bypass) Mother      Heart disease Brother      Sudden death Brother          cardiac death    Hypertension Other     [2]   Medications Prior to Admission   Medication Sig Dispense Refill Last Dose/Taking    acetaminophen (Tylenol) 325 mg tablet Take 2 tablets (650 mg) by mouth every 4 hours if needed for mild pain (1 - 3). 30 tablet 0     acetaminophen (Tylenol) 325 mg tablet Take 2 tablets (650 mg) by mouth every 4 hours if needed for mild pain (1 - 3). 30 tablet 0     albuterol 1.25 mg/3 mL nebulizer solution Take 3 mL (1.25 mg) by nebulization 3 times  a day.       atorvastatin (Lipitor) 80 mg tablet Take 1 tablet (80 mg) by mouth once daily at bedtime. For 90       benzocaine-menthol (Cepastat Sore Throat) lozenge Dissolve 1 lozenge in the mouth every 2 hours if needed for sore throat.       dextromethorphan-guaifenesin (Robitussin DM)  mg/5 mL oral liquid Take 5 mL by mouth every 4 hours if needed for cough. 118 mL 0     donepezil (Aricept) 10 mg tablet Take 1 tablet (10 mg) by mouth once daily at bedtime.       hydrOXYzine pamoate (Vistaril) 25 mg capsule Take 1 capsule (25 mg) by mouth every 8 hours if needed for itching. 30 capsule 0     melatonin 3 mg tablet Take 1 tablet (3 mg) by mouth as needed at bedtime for sleep.       memantine (Namenda) 5 mg tablet Take 1 tablet (5 mg) by mouth once daily.       metoprolol succinate XL (Toprol-XL) 25 mg 24 hr tablet Take 1 tablet (25 mg) by mouth once daily. Do not crush or chew.       ondansetron (Zofran) 4 mg/2 mL injection Infuse 2 mL (4 mg) into a venous catheter every 8 hours if needed for nausea.       ondansetron ODT (Zofran-ODT) 4 mg disintegrating tablet Take 1 tablet (4 mg) by mouth every 8 hours if needed for nausea.       pantoprazole (ProtoNix) 40 mg EC tablet Take 1 tablet (40 mg) by mouth once daily in the morning. Take before meals. Do not crush, chew, or split.      [3] atorvastatin, 80 mg, oral, Nightly  donepezil, 10 mg, oral, Nightly  memantine, 5 mg, oral, Daily  metoprolol succinate XL, 25 mg, oral, Daily  pantoprazole, 40 mg, oral, Daily before breakfast  potassium chloride, 20 mEq, intravenous, q2h  vancomycin, 250 mg, oral, 4x daily    [4]    [5] PRN medications: acetaminophen, albuterol, benzocaine-menthol, dextromethorphan-guaifenesin, hydrOXYzine HCL, melatonin, ondansetron, ondansetron ODT

## 2025-08-08 NOTE — PROGRESS NOTES
08/08/25 4528   Discharge Planning   Living Arrangements Other (Comment)  (CM spoke to patient and dtr at bedside. Pt is now long term resident at [facility]. SHe has medicaid and medicare)   Support Systems Children   Assistance Needed pt is LTC at [facility]. Pt is a 1-2 assist from bed to chair at baseline   Type of Residence Skilled nursing facility  (long term care)   Do you have animals or pets at home? No   Who is requesting discharge planning? Provider   Home or Post Acute Services Post acute facilities (Rehab/SNF/etc)   Type of Post Acute Facility Services Long term care   Expected Discharge Disposition Inter  ([facility])   Does the patient need discharge transport arranged? Yes   [facility] coordination needed? Yes   Has discharge transport been arranged? No     Pt remains in obs status at the time of CM visit. Plan to treat potassium and diarrhea. CM sent referral back to [facility] to confirm her admission status.     Dispo: Return to [facility] Long term placement  GARY: per team    Discharge plan NOT finalized. Please contact [facility] prior to attempting to discharge this patient. Thank you.

## 2025-08-09 ENCOUNTER — APPOINTMENT (OUTPATIENT)
Dept: RADIOLOGY | Facility: HOSPITAL | Age: 85
DRG: 371 | End: 2025-08-09
Payer: MEDICARE

## 2025-08-09 LAB
ANION GAP SERPL CALCULATED.3IONS-SCNC: 10 MMOL/L (ref 10–20)
ANION GAP SERPL CALCULATED.3IONS-SCNC: 11 MMOL/L (ref 10–20)
ATRIAL RATE: 57 BPM
BUN SERPL-MCNC: 12 MG/DL (ref 6–23)
BUN SERPL-MCNC: 14 MG/DL (ref 6–23)
C COLI+JEJ+UPSA DNA STL QL NAA+PROBE: NOT DETECTED
C DIFF TOX A+B STL QL IA: NEGATIVE
CALCIUM SERPL-MCNC: 8.4 MG/DL (ref 8.6–10.3)
CALCIUM SERPL-MCNC: 8.8 MG/DL (ref 8.6–10.3)
CHLORIDE SERPL-SCNC: 117 MMOL/L (ref 98–107)
CHLORIDE SERPL-SCNC: 118 MMOL/L (ref 98–107)
CO2 SERPL-SCNC: 23 MMOL/L (ref 21–32)
CO2 SERPL-SCNC: 24 MMOL/L (ref 21–32)
CREAT SERPL-MCNC: 1.21 MG/DL (ref 0.5–1.05)
CREAT SERPL-MCNC: 1.22 MG/DL (ref 0.5–1.05)
EC STX1 GENE STL QL NAA+PROBE: NOT DETECTED
EC STX2 GENE STL QL NAA+PROBE: NOT DETECTED
EGFRCR SERPLBLD CKD-EPI 2021: 43 ML/MIN/1.73M*2
EGFRCR SERPLBLD CKD-EPI 2021: 44 ML/MIN/1.73M*2
ERYTHROCYTE [DISTWIDTH] IN BLOOD BY AUTOMATED COUNT: 17.1 % (ref 11.5–14.5)
GLUCOSE SERPL-MCNC: 108 MG/DL (ref 74–99)
GLUCOSE SERPL-MCNC: 97 MG/DL (ref 74–99)
HCT VFR BLD AUTO: 45.8 % (ref 36–46)
HGB BLD-MCNC: 13.8 G/DL (ref 12–16)
MCH RBC QN AUTO: 29.6 PG (ref 26–34)
MCHC RBC AUTO-ENTMCNC: 30.1 G/DL (ref 32–36)
MCV RBC AUTO: 98 FL (ref 80–100)
NOROVIRUS GI + GII RNA STL NAA+PROBE: NOT DETECTED
NRBC BLD-RTO: 0 /100 WBCS (ref 0–0)
P AXIS: 61 DEGREES
P OFFSET: 182 MS
P ONSET: 142 MS
PLATELET # BLD AUTO: 260 X10*3/UL (ref 150–450)
POTASSIUM SERPL-SCNC: 4.5 MMOL/L (ref 3.5–5.3)
POTASSIUM SERPL-SCNC: 5.2 MMOL/L (ref 3.5–5.3)
PR INTERVAL: 170 MS
Q ONSET: 227 MS
QRS COUNT: 9 BEATS
QRS DURATION: 82 MS
QT INTERVAL: 562 MS
QTC CALCULATION(BAZETT): 547 MS
QTC FREDERICIA: 552 MS
R AXIS: 109 DEGREES
RBC # BLD AUTO: 4.67 X10*6/UL (ref 4–5.2)
RV RNA STL NAA+PROBE: NOT DETECTED
SALMONELLA DNA STL QL NAA+PROBE: NOT DETECTED
SHIGELLA DNA SPEC QL NAA+PROBE: NOT DETECTED
SODIUM SERPL-SCNC: 145 MMOL/L (ref 136–145)
SODIUM SERPL-SCNC: 148 MMOL/L (ref 136–145)
T AXIS: 70 DEGREES
T OFFSET: 508 MS
V CHOLERAE DNA STL QL NAA+PROBE: NOT DETECTED
VENTRICULAR RATE: 57 BPM
WBC # BLD AUTO: 13.9 X10*3/UL (ref 4.4–11.3)
Y ENTEROCOL DNA STL QL NAA+PROBE: NOT DETECTED

## 2025-08-09 PROCEDURE — 2500000001 HC RX 250 WO HCPCS SELF ADMINISTERED DRUGS (ALT 637 FOR MEDICARE OP): Performed by: INTERNAL MEDICINE

## 2025-08-09 PROCEDURE — 80048 BASIC METABOLIC PNL TOTAL CA: CPT | Performed by: INTERNAL MEDICINE

## 2025-08-09 PROCEDURE — 36415 COLL VENOUS BLD VENIPUNCTURE: CPT | Performed by: INTERNAL MEDICINE

## 2025-08-09 PROCEDURE — 2500000002 HC RX 250 W HCPCS SELF ADMINISTERED DRUGS (ALT 637 FOR MEDICARE OP, ALT 636 FOR OP/ED): Performed by: INTERNAL MEDICINE

## 2025-08-09 PROCEDURE — 2500000004 HC RX 250 GENERAL PHARMACY W/ HCPCS (ALT 636 FOR OP/ED): Performed by: INTERNAL MEDICINE

## 2025-08-09 PROCEDURE — 85027 COMPLETE CBC AUTOMATED: CPT | Performed by: INTERNAL MEDICINE

## 2025-08-09 PROCEDURE — 1200000002 HC GENERAL ROOM WITH TELEMETRY DAILY

## 2025-08-09 PROCEDURE — 72131 CT LUMBAR SPINE W/O DYE: CPT | Performed by: RADIOLOGY

## 2025-08-09 PROCEDURE — 72131 CT LUMBAR SPINE W/O DYE: CPT

## 2025-08-09 RX ORDER — SODIUM CHLORIDE 9 MG/ML
75 INJECTION, SOLUTION INTRAVENOUS CONTINUOUS
Status: ACTIVE | OUTPATIENT
Start: 2025-08-09 | End: 2025-08-10

## 2025-08-09 RX ADMIN — SODIUM CHLORIDE 75 ML/HR: 900 INJECTION, SOLUTION INTRAVENOUS at 13:35

## 2025-08-09 RX ADMIN — VANCOMYCIN HYDROCHLORIDE 250 MG: 125 CAPSULE ORAL at 17:00

## 2025-08-09 RX ADMIN — VANCOMYCIN HYDROCHLORIDE 250 MG: 125 CAPSULE ORAL at 20:20

## 2025-08-09 RX ADMIN — VANCOMYCIN HYDROCHLORIDE 250 MG: 125 CAPSULE ORAL at 13:38

## 2025-08-09 RX ADMIN — POTASSIUM CHLORIDE 40 MEQ: 1.5 POWDER, FOR SOLUTION ORAL at 00:43

## 2025-08-09 RX ADMIN — POTASSIUM CHLORIDE 40 MEQ: 1.5 POWDER, FOR SOLUTION ORAL at 05:31

## 2025-08-09 RX ADMIN — DONEPEZIL HYDROCHLORIDE 10 MG: 10 TABLET, FILM COATED ORAL at 20:20

## 2025-08-09 RX ADMIN — VANCOMYCIN HYDROCHLORIDE 250 MG: 125 CAPSULE ORAL at 06:50

## 2025-08-09 RX ADMIN — ATORVASTATIN CALCIUM 80 MG: 80 TABLET, FILM COATED ORAL at 20:20

## 2025-08-09 RX ADMIN — PANTOPRAZOLE SODIUM 40 MG: 40 TABLET, DELAYED RELEASE ORAL at 06:50

## 2025-08-09 RX ADMIN — METOPROLOL SUCCINATE 25 MG: 25 TABLET, EXTENDED RELEASE ORAL at 08:50

## 2025-08-09 RX ADMIN — MEMANTINE 5 MG: 5 TABLET ORAL at 08:50

## 2025-08-09 ASSESSMENT — COGNITIVE AND FUNCTIONAL STATUS - GENERAL
HELP NEEDED FOR BATHING: TOTAL
DRESSING REGULAR UPPER BODY CLOTHING: TOTAL
CLIMB 3 TO 5 STEPS WITH RAILING: TOTAL
CLIMB 3 TO 5 STEPS WITH RAILING: TOTAL
HELP NEEDED FOR BATHING: TOTAL
EATING MEALS: A LITTLE
WALKING IN HOSPITAL ROOM: A LOT
DRESSING REGULAR UPPER BODY CLOTHING: TOTAL
TURNING FROM BACK TO SIDE WHILE IN FLAT BAD: A LOT
MOVING TO AND FROM BED TO CHAIR: A LOT
MOVING FROM LYING ON BACK TO SITTING ON SIDE OF FLAT BED WITH BEDRAILS: A LOT
TOILETING: TOTAL
MOBILITY SCORE: 11
WALKING IN HOSPITAL ROOM: A LOT
STANDING UP FROM CHAIR USING ARMS: A LOT
MOVING TO AND FROM BED TO CHAIR: A LOT
DAILY ACTIVITIY SCORE: 8
PERSONAL GROOMING: TOTAL
DAILY ACTIVITIY SCORE: 9
STANDING UP FROM CHAIR USING ARMS: A LOT
MOVING FROM LYING ON BACK TO SITTING ON SIDE OF FLAT BED WITH BEDRAILS: A LOT
TOILETING: TOTAL
PERSONAL GROOMING: TOTAL
DRESSING REGULAR LOWER BODY CLOTHING: TOTAL
DRESSING REGULAR LOWER BODY CLOTHING: TOTAL
MOBILITY SCORE: 11
TURNING FROM BACK TO SIDE WHILE IN FLAT BAD: A LOT

## 2025-08-09 ASSESSMENT — PAIN SCALES - PAIN ASSESSMENT IN ADVANCED DEMENTIA (PAINAD)
FACIALEXPRESSION: SMILING OR INEXPRESSIVE
CONSOLABILITY: NO NEED TO CONSOLE
BODYLANGUAGE: RELAXED
BREATHING: NORMAL
TOTALSCORE: 0

## 2025-08-09 ASSESSMENT — PAIN - FUNCTIONAL ASSESSMENT
PAIN_FUNCTIONAL_ASSESSMENT: PAINAD (PAIN ASSESSMENT IN ADVANCED DEMENTIA SCALE)
PAIN_FUNCTIONAL_ASSESSMENT: 0-10

## 2025-08-09 ASSESSMENT — PAIN SCALES - GENERAL: PAINLEVEL_OUTOF10: 0 - NO PAIN

## 2025-08-09 NOTE — PROGRESS NOTES
Riya Hernandez is a 84 y.o. female on day 1 of admission presenting with Diarrhea, unspecified type.    Subjective   Interval History:   Afebrile, no chills  Denies any diarrhea  No abdominal pain, nausea vomiting     Review of Systems   All other systems reviewed and are negative.      Objective   Range of Vitals (last 24 hours)  Heart Rate:  [62-73]   Temp:  [36.4 °C (97.5 °F)-36.7 °C (98.1 °F)]   Resp:  [18]   BP: (108-122)/(50-70)   SpO2:  [93 %-99 %]        Daily Weight  08/07/25 : 63.5 kg (140 lb)    Body mass index is 25.61 kg/m².    Physical Exam  Constitutional:       General: She is awake.   HENT:      Head: Normocephalic and atraumatic.      Right Ear: External ear normal.      Left Ear: External ear normal.      Nose: Nose normal.      Eyes:      General: No scleral icterus.     Extraocular Movements: Extraocular movements intact.      Conjunctiva/sclera: Conjunctivae normal.         Cardiovascular:      Rate and Rhythm: Normal rate and regular rhythm.      Heart sounds: Normal heart sounds.   Pulmonary:      Breath sounds: Normal breath sounds.   Abdominal:      Palpations: Abdomen is soft.      Tenderness: There is no abdominal tenderness.      Musculoskeletal:      Cervical back: Normal range of motion and neck supple.      Right lower leg: No edema.      Left lower leg: No edema.      Skin:     General: Skin is warm and dry.      Neurological:      Mental Status: She is awake     Psychiatric:         Behavior: Behavior is not agitated.         Antibiotics  vancomycin - 125 mg    Relevant Results  Labs  Results from last 72 hours   Lab Units 08/09/25  0654 08/07/25  1904   WBC AUTO x10*3/uL 13.9* 11.7*   HEMOGLOBIN g/dL 13.8 12.6   HEMATOCRIT % 45.8 39.9   PLATELETS AUTO x10*3/uL 260 251   NEUTROS PCT AUTO %  --  73.6   LYMPHS PCT AUTO %  --  17.4   MONOS PCT AUTO %  --  7.1   EOS PCT AUTO %  --  1.2     Results from last 72 hours   Lab Units 08/09/25  0654 08/08/25  1742 08/08/25  1050  "25  0142   SODIUM mmol/L 148*  --  144 144   POTASSIUM mmol/L 5.2 2.9* 2.5* 3.0*   CHLORIDE mmol/L 118*  --  110* 110*   CO2 mmol/L 24  --  26 28   BUN mg/dL 12  --  12 14   CREATININE mg/dL 1.22*  --  1.06* 1.10*   GLUCOSE mg/dL 108*  --  142* 78   CALCIUM mg/dL 8.8  --  8.2* 8.3*   ANION GAP mmol/L 11  --  11 9*   EGFR mL/min/1.73m*2 43*  --  51* 49*     Results from last 72 hours   Lab Units 25   ALK PHOS U/L 137*   BILIRUBIN TOTAL mg/dL 0.5   PROTEIN TOTAL g/dL 6.1*   ALT U/L 5*   AST U/L 14   ALBUMIN g/dL 3.1*     Estimated Creatinine Clearance: 29 mL/min (A) (by C-G formula based on SCr of 1.22 mg/dL (H)).  No results found for: \"CRP\"  Microbiology  Reviewed-positive C. difficile PCR, negative EIA  Imaging  CT lumbar spine wo IV contrast  Result Date: 2025  Interpreted By:  Allison Lowery, STUDY: CT LUMBAR SPINE WO IV CONTRAST;  2025 12:45 pm   INDICATION: Signs/Symptoms:unable to walk, weak, bowel and urine incontinence right foot droop   COMPARISON: CT abdomen and pelvis 2025 CT hip 2023.   ACCESSION NUMBER(S): [id number]   ORDERING CLINICIAN: LAZ ROONEY   TECHNIQUE: Noncontrast CT axial images were obtained through the lumbar spine. Coronal and sagittal reformats were performed.   FINDINGS: NUMBERIN lumbar type vertebral bodies.   ALIGNMENT: Normal.   VERTEBRAE: Compression deformity of the L4 vertebral body with a proximally 30% loss of vertebral body height; this is of uncertain chronicity but is new from .  No suspicious osseous lesions. Significant hypodensity of the L1 vertebral body consistent with osteoporosis.   SOFT TISSUES: The prevertebral soft tissues are unremarkable.   LUMBAR DISC SPACES: L1-2: Disc bulge, bilateral facet arthropathy and ligamentum flavum thickening. No spinal canal or foraminal narrowing.   L2-3: Disc bulge, bilateral facet arthropathy and ligamentum flavum thickening. Mild spinal canal stenosis. No foraminal stenosis.   L3-4: Disc " bulge, bilateral facet arthropathy, ligamentum flavum thickening. Severe spinal canal stenosis. No foraminal stenosis.   L4-5: Disc bulge, bilateral facet arthropathy, ligamentum flavum thickening. Severe spinal canal stenosis. Mild bilateral foraminal stenosis.   L5-S1: Disc bulge, bilateral facet arthropathy. No spinal canal stenosis. Mild bilateral foraminal stenosis.   OTHER: Extensive vascular calcifications.       1. Diffuse degenerative changes throughout the lumbar spine, worse at L3-L4 and L4-L5 where there is severe spinal canal stenosis. No significant foraminal stenosis. 2. Indeterminate age compression fracture of L4 with approximately 30% loss of vertebral body height. 3. Findings consistent with osteoporosis; this can be confirmed with DEXA scan.     I personally reviewed the images/study and I agree with the findings as stated.   MACRO: None.   Signed by: Allison Lowery 8/9/2025 1:11 PM Dictation workstation:   [id number]    ECG 12 lead  Result Date: 8/8/2025  Sinus bradycardia Possible Right ventricular hypertrophy Nonspecific ST abnormality Abnormal ECG When compared with ECG of 05-JUL-2024 11:36, T wave inversion no longer evident in Inferior leads T wave inversion less evident in Anterior leads Nonspecific T wave abnormality, worse in Lateral leads QT has lengthened    CT abdomen pelvis w IV contrast  Result Date: 8/7/2025  STUDY: CT Abdomen and Pelvis with IV Contrast; 08/07/2025 10:34 PM INDICATION: Diarrhea. COMPARISON: None. ACCESSION NUMBER(S): [id number] ORDERING CLINICIAN: GLEN GUERRA TECHNIQUE: CT of the abdomen and pelvis was performed.  Contiguous axial images were obtained at 3 mm slice thickness through the abdomen and pelvis. Coronal and sagittal reconstructions at 3 mm slice thickness were performed.  75 mL Omnipaque-350 was administered intravenously.  670 DICOM images received. FINDINGS: LOWER CHEST: No cardiomegaly.  No pericardial effusion.  Lung bases are clear.  ABDOMEN:   LIVER: No hepatomegaly.  Smooth surface contour.  Normal attenuation.  BILE DUCTS: No intrahepatic or extrahepatic biliary ductal dilatation.  GALLBLADDER: The gallbladder is unremarkable. STOMACH: No abnormalities identified.  PANCREAS: Numerous pancreatic tail cystic lesions including multiple clusters, for example measuring 1.6 cm (series 3 image 35).  SPLEEN: No splenomegaly or focal splenic lesion.  ADRENAL GLANDS: No thickening or nodules.  KIDNEYS AND URETERS: 1.0 cm calculus in the right renal pelvis.  Left renal cortical scarring.  No hydronephrosis bilaterally.  PELVIS:  BLADDER: No abnormalities identified.  REPRODUCTIVE ORGANS: No abnormalities identified.  BOWEL: No abnormalities identified.  VESSELS: Abdominal aorta is normal in caliber.  Atherosclerotic vascular calcifications.  PERITONEUM/RETROPERITONEUM/LYMPH NODES: No free fluid.  No pneumoperitoneum.  No lymphadenopathy.  ABDOMINAL WALL: No abnormalities identified. SOFT TISSUES: No abnormalities identified.  BONES: Mild/moderate compression deformity of the L4 vertebral body. Multilevel degenerative changes.  Osteopenia.    1.Mild/moderate compression deformity of the L4 vertebral body. Correlate with point tenderness. 2.Numerous pancreatic tail cystic lesions including multiple clusters, for example measuring 1.6 cm. consider further evaluation with MRI/MRCP. 3.A 1.0 cm calculus in the right renal pelvis. No hydronephrosis bilaterally. Signed by Tapan [employer]    XR chest 1 view  Result Date: 8/7/2025  STUDY: Chest Radiograph;  08/07/2025 at 9:37 PM INDICATION: Weakness. COMPARISON: None available. ACCESSION NUMBER(S): [id number] ORDERING CLINICIAN: GLEN GUERRA TECHNIQUE:  Frontal chest was obtained at 2137 hours. FINDINGS: CARDIOMEDIASTINAL SILHOUETTE: Cardiomediastinal silhouette is normal in size and configuration.  LUNGS: Lungs are clear.  ABDOMEN: No remarkable upper abdominal findings.  BONES: No acute osseous changes.    No acute  pulmonary pathology. Signed by Joby Miranda MD         Assessment/Plan   Encephalopathy, resolving   Pyuria versus urinary tract infection, urine culture pending  Dementia  Clostridium difficile infection-positive PCR, EIA pending     Continue oral vancomycin  Follow-up urine culture  Monitor stool  Follow-up pending workup  Contact plus precautions  Supportive care  Monitor temperature and WBC     This is a complex infectious disease issue and the following was performed today (for more details please see the above note): Management decisions reflecting the added complexity (e.g., changes in antimicrobial therapy, infection control strategies).        Pete Wilson MD

## 2025-08-09 NOTE — CARE PLAN
The patient's goals for the shift include remain safe and comfortable    The clinical goals for the shift include monitor labs, VS, manage pain    Over the shift, the patient did not make progress toward the following goals. Barriers to progression include na. Recommendations to address these barriers include na.    Problem: Pain - Adult  Goal: Verbalizes/displays adequate comfort level or baseline comfort level  Outcome: Progressing     Problem: Safety - Adult  Goal: Free from fall injury  Outcome: Progressing     Problem: Discharge Planning  Goal: Discharge to home or other facility with appropriate resources  Outcome: Progressing     Problem: Chronic Conditions and Co-morbidities  Goal: Patient's chronic conditions and co-morbidity symptoms are monitored and maintained or improved  Outcome: Progressing     Problem: Skin  Goal: Decreased wound size/increased tissue granulation at next dressing change  Outcome: Progressing  Flowsheets (Taken 8/9/2025 2747)  Decreased wound size/increased tissue granulation at next dressing change:   Promote sleep for wound healing   Protective dressings over bony prominences  Goal: Participates in plan/prevention/treatment measures  Outcome: Progressing  Goal: Prevent/manage excess moisture  Outcome: Progressing  Goal: Prevent/minimize sheer/friction injuries  Outcome: Progressing  Goal: Promote/optimize nutrition  Outcome: Progressing  Goal: Promote skin healing  Outcome: Progressing     Problem: Nutrition  Goal: Nutrient intake appropriate for maintaining nutritional needs  Outcome: Progressing

## 2025-08-09 NOTE — PROGRESS NOTES
Riya Hernandez is a 84 y.o. female on day 1 of admission presenting with Diarrhea, unspecified type.      Subjective   More awake and alert daughter came in ,patient feeding herself       Objective     Last Recorded Vitals  /50 (BP Location: Right arm, Patient Position: Lying)   Pulse 68   Temp 36.6 °C (97.9 °F) (Axillary)   Resp 18   Wt 63.5 kg (140 lb)   SpO2 99%   Intake/Output last 3 Shifts:    Intake/Output Summary (Last 24 hours) at 8/9/2025 1215  Last data filed at 8/8/2025 1332  Gross per 24 hour   Intake 240 ml   Output --   Net 240 ml       Admission Weight  Weight: 63.5 kg (140 lb) (08/07/25 1822)    Daily Weight  08/07/25 : 63.5 kg (140 lb)    Image Results  ECG 12 lead  Sinus bradycardia  Possible Right ventricular hypertrophy  Nonspecific ST abnormality  Abnormal ECG  When compared with ECG of 05-JUL-2024 11:36,  T wave inversion no longer evident in Inferior leads  T wave inversion less evident in Anterior leads  Nonspecific T wave abnormality, worse in Lateral leads  QT has lengthened      Physical Exam/right foot droop, weak, bowel and urine incontinence    Relevant Results                            Assessment & Plan  Diarrhea, unspecified type    C diff on vanco     Urine and bowel incontinence, neuro consult ,ct lumbar      Zuly Snyder MD

## 2025-08-09 NOTE — DOCUMENTATION CLARIFICATION NOTE
"    PATIENT:               NIC CHÁVEZ  ACCT #:                  [id number]  MRN:                       [id number]  :                       1940  ADMIT DATE:       2025 6:17 PM  DISCH DATE:  RESPONDING PROVIDER #:        [id number]          PROVIDER RESPONSE TEXT:    Toxic metabolic encephalopathy    CDI QUERY TEXT:    Clarification        Instruction:    Based on your assessment of the patient and the clinical information, please provide the requested documentation by clicking on the appropriate radio button and enter any additional information if prompted.    Question: Please further clarify the most likely etiology of the altered mental status as    When answering this query, please exercise your independent professional judgment. The fact that a question is being asked, does not imply that any particular answer is desired or expected.    The patient's clinical indicators include:  Clinical Information: 84 y.o. female presenting with diarrhea, weak unable to walk, c diff colitis low potassium.    Clinical Indicators:    Labs: : Potassium 2.5, wbc 11.7,  C diff positive    : ID: \"Assessment/Plan: Encephalopathy  Pyuria versus urinary tract infection  Dementia  Clostridium difficile infection-positive PCR, EIA pending\"    : ID: \"Neurological: Mental Status: She is confused.\"    Treatment: Treat infection: vancomycin 250 mg po 4 x daily. Treat hypokalemia: potassium chloride 40 meq po q 4 hrs, potassium chloride 20 meq iv x 2    Risk Factors: Dementia, C diff positive  Options provided:  -- Metabolic encephalopathy  -- Toxic encephalopathy  -- Toxic metabolic encephalopathy  -- Other - I will add my own diagnosis  -- Refer to Clinical Documentation Reviewer    Query created by: Blaze Gutierrez on 2025 10:23 AM      Electronically signed by:  BEATRIS MYRICK MD 2025 2:57 PM          "

## 2025-08-09 NOTE — CONSULTS
Nephrology Consult Note                                                                                                                                         Consults                                                                                                         HPI  Patient is a 84 y.o. female   here for Cdiff colitis . Nephrology consulted in view of KEIRY , Hypokalemia .     Riya Hernandez is a 84 y.o.  has history of dementia, and is a nursing home resident,  She is unable to provide any comprehensive history, history was obtained from note   She is reported to have diarrhea for the last couple of weeks.,  was found to be hypokalemic.  Her stool tested positive for C. difficile  She is on oral vancomycin.    Per daughter,  patient has chronic hypokalemia     Medical History[1]   Social History     Socioeconomic History    Marital status:      Spouse name: Not on file    Number of children: Not on file    Years of education: Not on file    Highest education level: Not on file   Occupational History    Not on file   Tobacco Use    Smoking status: Every Day     Current packs/day: 0.25     Average packs/day: 0.3 packs/day for 10.0 years (2.5 ttl pk-yrs)     Types: Cigarettes     Passive exposure: Never    Smokeless tobacco: Never   Substance and Sexual Activity    Alcohol use: Defer    Drug use: Never    Sexual activity: Not Currently   Other Topics Concern    Not on file   Social History Narrative    Not on file     Social Drivers of Health     Financial Resource Strain: Patient Unable To Answer (8/8/2025)    Overall Financial Resource Strain (CARDIA)     Difficulty of Paying Living Expenses: Patient unable to answer   Food Insecurity: Patient Unable To Answer (8/8/2025)    Hunger Vital Sign     Worried About Running Out of Food in the Last Year: Patient unable to answer     Ran Out of Food in the Last Year: Patient unable to answer   Transportation Needs: Patient Unable To Answer (8/8/2025)     "PRAPARE - Transportation     Lack of Transportation (Medical): Patient unable to answer     Lack of Transportation (Non-Medical): Patient unable to answer   Physical Activity: Inactive (9/25/2024)    Exercise Vital Sign     Days of Exercise per Week: 0 days     Minutes of Exercise per Session: 0 min   Stress: No Stress Concern Present (9/25/2024)    [employer] - Occupational Stress Questionnaire     Feeling of Stress : Not at all   Social Connections: Socially Isolated (9/25/2024)    Social Connection and Isolation Panel     Frequency of Communication with Friends and Family: Never     Frequency of Social Gatherings with Friends and Family: More than three times a week     Attends Baptism Services: Never     Active Member of Clubs or Organizations: No     Attends Club or Organization Meetings: Never     Marital Status:    Intimate Partner Violence: Patient Unable To Answer (8/8/2025)    Humiliation, Afraid, Rape, and Kick questionnaire     Fear of Current or Ex-Partner: Patient unable to answer     Emotionally Abused: Patient unable to answer     Physically Abused: Patient unable to answer     Sexually Abused: Patient unable to answer   Housing Stability: Unknown (8/8/2025)    Housing Stability Vital Sign     Unable to Pay for Housing in the Last Year: Patient unable to answer     Number of Times Moved in the Last Year: 0     Homeless in the Last Year: Patient unable to answer      Family History[2]   Medications Ordered Prior to Encounter[3]   Scheduled medications  Scheduled Medications[4]  Continuous medications  Continuous Medications[5]  PRN medications  PRN Medications[6]     Review of systems  as per HPI otherwise 10 point review systems negative    /50 (BP Location: Right arm, Patient Position: Lying)   Pulse 68   Temp 36.6 °C (97.9 °F) (Axillary)   Resp 18   Ht 1.575 m (5' 2\")   Wt 63.5 kg (140 lb)   SpO2 99%   BMI 25.61 kg/m²     Input / Output:  24 HR: "   Intake/Output Summary (Last 24 hours) at 8/9/2025 1436  Last data filed at 8/9/2025 1401  Gross per 24 hour   Intake 32.5 ml   Output --   Net 32.5 ml       Physical Exam   Alert and oriented x 1, NAD  EOMI  OP clear  Neck: supple, No JVD  CV: RRR without m/r/g  Lungs: CTA bilaterally  Abd: soft NT/ND +BS  Ext: no  lower extremity edema   : no kraft  Neuro: grossly intact  Skin: no rashes    Results from last 7 days   Lab Units 08/09/25  0654 08/08/25  1742 08/08/25  1050 08/08/25  0142   SODIUM mmol/L 148*  --  144 144   POTASSIUM mmol/L 5.2 2.9* 2.5* 3.0*   CHLORIDE mmol/L 118*  --  110* 110*   CO2 mmol/L 24  --  26 28   BUN mg/dL 12  --  12 14   CREATININE mg/dL 1.22*  --  1.06* 1.10*   GLUCOSE mg/dL 108*  --  142* 78   CALCIUM mg/dL 8.8  --  8.2* 8.3*        Results from last 7 days   Lab Units 08/09/25  0654 08/08/25  0142 08/07/25 1959   SODIUM mmol/L 148*   < > 145   POTASSIUM mmol/L 5.2   < > 2.5*   CHLORIDE mmol/L 118*   < > 108*   CO2 mmol/L 24   < > 28   BUN mg/dL 12   < > 14   CREATININE mg/dL 1.22*   < > 1.02   CALCIUM mg/dL 8.8   < > 8.6   PROTEIN TOTAL g/dL  --   --  6.1*   BILIRUBIN TOTAL mg/dL  --   --  0.5   ALK PHOS U/L  --   --  137*   ALT U/L  --   --  5*   AST U/L  --   --  14   GLUCOSE mg/dL 108*   < > 80    < > = values in this interval not displayed.      Results from last 7 days   Lab Units 08/08/25  1050 08/07/25 1959   MAGNESIUM mg/dL 1.87 1.84      Results from last 7 days   Lab Units 08/09/25  0654 08/07/25  1904   WBC AUTO x10*3/uL 13.9* 11.7*   HEMOGLOBIN g/dL 13.8 12.6   HEMATOCRIT % 45.8 39.9   PLATELETS AUTO x10*3/uL 260 251        CT abdomen pelvis w IV contrast   Final Result   1.Mild/moderate compression deformity of the L4 vertebral body.    Correlate with point tenderness.   2.Numerous pancreatic tail cystic lesions including multiple   clusters, for example measuring 1.6 cm. consider further evaluation   with MRI/MRCP.   3.A 1.0 cm calculus in the right renal pelvis. No  hydronephrosis   bilaterally.   Signed by Tapan Morales      XR chest 1 view   Final Result   No acute pulmonary pathology.   Signed by Joby Miranda MD           Assessment:   Patient is a 84 y.o. female   here for Cdiff colitis . Nephrology consulted in view of KEIRY , Hypokalemia .     KEIRY   Hypokalemia   Encephalopathy  Dementia  Clostridium difficile infection-positive PCR,   Hyperlipidemia     Recommendations:   KEIRY , related to Diarrhea, and Poor oral intake , start on IV fluid   Avoid nephrotoxin ,   Cont treatment for Cdiff   Hypokalemia , Urine K 16,  suggesting GI losses, currently related to Cdiff , chronically prob related to Laxative use.   K was replaced  Check BMP today and AM,  will replace K as needed     Please message me through [employer] chat with any questions or concerns.     Hemalatha Juarez MD  8/9/2025  2:36 PM                  [1]   Past Medical History:  Diagnosis Date    Alzheimer disease (Multi)     AMI (acute myocardial infarction) (Multi)     Anemia     Anxiety     Arrhythmia     ASHD (arteriosclerotic heart disease)     At risk for falling     Chest pain     COPD (chronic obstructive pulmonary disease) (Multi)     Coronary artery disease     Delirium     Delirium     Dementia     GI bleed     Hyperlipidemia     Hypertension     Lightheadedness     Myocardial infarction (Multi)     Near syncope     Osteoarthritis     Rhabdomyolysis     Syncope     Urinary tract infection     Weakness    [2]   Family History  Problem Relation Name Age of Onset    Heart disease Mother      Other (triple bypass) Mother      Heart disease Brother      Sudden death Brother          cardiac death    Hypertension Other     [3]   No current facility-administered medications on file prior to encounter.     Current Outpatient Medications on File Prior to Encounter   Medication Sig Dispense Refill    acetaminophen (Tylenol) 325 mg tablet Take 2 tablets (650 mg) by mouth every 4 hours if needed for mild pain (1 - 3). 30  tablet 0    acetaminophen (Tylenol) 325 mg tablet Take 2 tablets (650 mg) by mouth every 4 hours if needed for mild pain (1 - 3). 30 tablet 0    albuterol 1.25 mg/3 mL nebulizer solution Take 3 mL (1.25 mg) by nebulization 3 times a day.      atorvastatin (Lipitor) 80 mg tablet Take 1 tablet (80 mg) by mouth once daily at bedtime. For 90      benzocaine-menthol (Cepastat Sore Throat) lozenge Dissolve 1 lozenge in the mouth every 2 hours if needed for sore throat.      dextromethorphan-guaifenesin (Robitussin DM)  mg/5 mL oral liquid Take 5 mL by mouth every 4 hours if needed for cough. 118 mL 0    donepezil (Aricept) 10 mg tablet Take 1 tablet (10 mg) by mouth once daily at bedtime.      hydrOXYzine pamoate (Vistaril) 25 mg capsule Take 1 capsule (25 mg) by mouth every 8 hours if needed for itching. 30 capsule 0    melatonin 3 mg tablet Take 1 tablet (3 mg) by mouth as needed at bedtime for sleep.      memantine (Namenda) 5 mg tablet Take 1 tablet (5 mg) by mouth once daily.      metoprolol succinate XL (Toprol-XL) 25 mg 24 hr tablet Take 1 tablet (25 mg) by mouth once daily. Do not crush or chew.      ondansetron (Zofran) 4 mg/2 mL injection Infuse 2 mL (4 mg) into a venous catheter every 8 hours if needed for nausea.      ondansetron ODT (Zofran-ODT) 4 mg disintegrating tablet Take 1 tablet (4 mg) by mouth every 8 hours if needed for nausea.      pantoprazole (ProtoNix) 40 mg EC tablet Take 1 tablet (40 mg) by mouth once daily in the morning. Take before meals. Do not crush, chew, or split.      [DISCONTINUED] docusate sodium (Colace) 100 mg capsule Take 1 capsule (100 mg) by mouth 2 times a day.      [DISCONTINUED] guaiFENesin (Mucinex) 600 mg 12 hr tablet Take 1 tablet (600 mg) by mouth every 12 hours if needed for congestion. Do not crush, chew, or split.      [DISCONTINUED] polyethylene glycol (Glycolax, Miralax) 17 gram packet Take 17 g by mouth once daily.     [4] atorvastatin, 80 mg, oral,  Nightly  donepezil, 10 mg, oral, Nightly  memantine, 5 mg, oral, Daily  metoprolol succinate XL, 25 mg, oral, Daily  pantoprazole, 40 mg, oral, Daily before breakfast  vancomycin, 250 mg, oral, 4x daily    [5] sodium chloride 0.9%, 75 mL/hr, Last Rate: 75 mL/hr (08/09/25 1401)    [6] PRN medications: acetaminophen, albuterol, benzocaine-menthol, dextromethorphan-guaifenesin, hydrOXYzine HCL, melatonin, ondansetron, ondansetron ODT

## 2025-08-09 NOTE — PROGRESS NOTES
Spiritual Care Visit  Spiritual Care Request    Reason for Visit:  Routine Visit: Introduction     Request Received From:       Focus of Care:  Visited With: Patient and family together         Refer to :          Spiritual Care Assessment    Spiritual Assessment:                      Care Provided:  Intended Effects: Build relationship of care and support, Convey a calming presence, Demonstrate caring and concern    Sense of Community and or [place]ze Affiliation:  Hoahaoism   Values/Beliefs  Spiritual Requests During Hospitalization: Riya asked to be anointed today.     Addressed Needs/Concerns and/or Lolis Through:     Sacramental Encounters  Sacrament of Sick-Anointing: Anointed    Outcome:        Advance Directives:         Spiritual Care Annotation    Annotation:  Riya asked to be anointed today.  Daughter=Pooja  Son-in-law=Chris Dominguezronda Negron

## 2025-08-09 NOTE — NURSING NOTE
Assumed care of pt. RR even and unlabored on RA. Pt very pleasant this AM. BSSR complete. Pt finished regimen of potassium for K of 2.9. Pt has no further needs at this time. Care ongoing.

## 2025-08-10 VITALS
DIASTOLIC BLOOD PRESSURE: 39 MMHG | OXYGEN SATURATION: 90 % | BODY MASS INDEX: 25.76 KG/M2 | RESPIRATION RATE: 18 BRPM | TEMPERATURE: 98.4 F | WEIGHT: 140 LBS | SYSTOLIC BLOOD PRESSURE: 109 MMHG | HEIGHT: 62 IN | HEART RATE: 62 BPM

## 2025-08-10 LAB
ALBUMIN SERPL BCP-MCNC: 2.8 G/DL (ref 3.4–5)
ANION GAP SERPL CALCULATED.3IONS-SCNC: 8 MMOL/L (ref 10–20)
BACTERIA UR CULT: NORMAL
BUN SERPL-MCNC: 15 MG/DL (ref 6–23)
CALCIUM SERPL-MCNC: 8.3 MG/DL (ref 8.6–10.3)
CHLORIDE SERPL-SCNC: 119 MMOL/L (ref 98–107)
CO2 SERPL-SCNC: 22 MMOL/L (ref 21–32)
CREAT SERPL-MCNC: 1.12 MG/DL (ref 0.5–1.05)
EGFRCR SERPLBLD CKD-EPI 2021: 48 ML/MIN/1.73M*2
GLUCOSE SERPL-MCNC: 78 MG/DL (ref 74–99)
HOLD SPECIMEN: NORMAL
PHOSPHATE SERPL-MCNC: 2.5 MG/DL (ref 2.5–4.9)
POTASSIUM SERPL-SCNC: 4.3 MMOL/L (ref 3.5–5.3)
SODIUM SERPL-SCNC: 145 MMOL/L (ref 136–145)

## 2025-08-10 PROCEDURE — 2500000002 HC RX 250 W HCPCS SELF ADMINISTERED DRUGS (ALT 637 FOR MEDICARE OP, ALT 636 FOR OP/ED): Performed by: INTERNAL MEDICINE

## 2025-08-10 PROCEDURE — 99222 1ST HOSP IP/OBS MODERATE 55: CPT | Performed by: STUDENT IN AN ORGANIZED HEALTH CARE EDUCATION/TRAINING PROGRAM

## 2025-08-10 PROCEDURE — 80069 RENAL FUNCTION PANEL: CPT | Performed by: INTERNAL MEDICINE

## 2025-08-10 PROCEDURE — 1200000002 HC GENERAL ROOM WITH TELEMETRY DAILY

## 2025-08-10 PROCEDURE — 36415 COLL VENOUS BLD VENIPUNCTURE: CPT | Performed by: INTERNAL MEDICINE

## 2025-08-10 PROCEDURE — 2500000001 HC RX 250 WO HCPCS SELF ADMINISTERED DRUGS (ALT 637 FOR MEDICARE OP): Performed by: INTERNAL MEDICINE

## 2025-08-10 RX ADMIN — METOPROLOL SUCCINATE 25 MG: 25 TABLET, EXTENDED RELEASE ORAL at 08:49

## 2025-08-10 RX ADMIN — ATORVASTATIN CALCIUM 80 MG: 80 TABLET, FILM COATED ORAL at 21:54

## 2025-08-10 RX ADMIN — PANTOPRAZOLE SODIUM 40 MG: 40 TABLET, DELAYED RELEASE ORAL at 06:22

## 2025-08-10 RX ADMIN — VANCOMYCIN HYDROCHLORIDE 250 MG: 125 CAPSULE ORAL at 06:22

## 2025-08-10 RX ADMIN — MEMANTINE 5 MG: 5 TABLET ORAL at 08:49

## 2025-08-10 RX ADMIN — DONEPEZIL HYDROCHLORIDE 10 MG: 10 TABLET, FILM COATED ORAL at 21:54

## 2025-08-10 RX ADMIN — VANCOMYCIN HYDROCHLORIDE 250 MG: 125 CAPSULE ORAL at 12:16

## 2025-08-10 RX ADMIN — VANCOMYCIN HYDROCHLORIDE 250 MG: 125 CAPSULE ORAL at 16:20

## 2025-08-10 ASSESSMENT — COGNITIVE AND FUNCTIONAL STATUS - GENERAL
HELP NEEDED FOR BATHING: A LOT
MOVING TO AND FROM BED TO CHAIR: A LOT
PERSONAL GROOMING: A LOT
DAILY ACTIVITIY SCORE: 14
TOILETING: A LOT
DRESSING REGULAR LOWER BODY CLOTHING: A LOT
MOBILITY SCORE: 12
DRESSING REGULAR LOWER BODY CLOTHING: A LOT
MOVING TO AND FROM BED TO CHAIR: A LOT
EATING MEALS: A LITTLE
TOILETING: A LOT
WALKING IN HOSPITAL ROOM: TOTAL
TURNING FROM BACK TO SIDE WHILE IN FLAT BAD: A LITTLE
MOVING FROM LYING ON BACK TO SITTING ON SIDE OF FLAT BED WITH BEDRAILS: A LITTLE
DRESSING REGULAR UPPER BODY CLOTHING: A LOT
WALKING IN HOSPITAL ROOM: TOTAL
PERSONAL GROOMING: A LOT
HELP NEEDED FOR BATHING: A LOT
DRESSING REGULAR UPPER BODY CLOTHING: A LITTLE
MOBILITY SCORE: 12
CLIMB 3 TO 5 STEPS WITH RAILING: TOTAL
CLIMB 3 TO 5 STEPS WITH RAILING: TOTAL
MOVING FROM LYING ON BACK TO SITTING ON SIDE OF FLAT BED WITH BEDRAILS: A LITTLE
STANDING UP FROM CHAIR USING ARMS: A LOT
EATING MEALS: A LITTLE
STANDING UP FROM CHAIR USING ARMS: A LOT
TURNING FROM BACK TO SIDE WHILE IN FLAT BAD: A LITTLE
DAILY ACTIVITIY SCORE: 13

## 2025-08-10 ASSESSMENT — PAIN SCALES - GENERAL
PAINLEVEL_OUTOF10: 0 - NO PAIN
PAINLEVEL_OUTOF10: 0 - NO PAIN

## 2025-08-10 ASSESSMENT — PAIN SCALES - PAIN ASSESSMENT IN ADVANCED DEMENTIA (PAINAD)
BODYLANGUAGE: RELAXED
FACIALEXPRESSION: SMILING OR INEXPRESSIVE
BREATHING: NORMAL
TOTALSCORE: 0
CONSOLABILITY: NO NEED TO CONSOLE

## 2025-08-10 ASSESSMENT — PAIN - FUNCTIONAL ASSESSMENT: PAIN_FUNCTIONAL_ASSESSMENT: 0-10

## 2025-08-10 NOTE — NURSING NOTE
Obtained bedside shift report from nightshift RN with patient laying in bed sleeping on arrival and through report. Patient had a BM overnight and RN reported it was soft and almost formed which was an improvement from the loose stool she was having before. Patient still sleeping with call light in reach and bed alarm engaged for safety.

## 2025-08-10 NOTE — NURSING NOTE
Per ID we will be holding the Vanco for now and monitoring the patient's rash to see if it gets any better or worse. Will continue to check on the patient.

## 2025-08-10 NOTE — PROGRESS NOTES
" Nephrology Progress Note    Riya Hernandez is 84 y.o. on day 2 of admission presenting with Hypokalemia [E87.6]  Pancreatic cyst ([facility]) [K86.2]  Diarrhea, unspecified type [R19.7]      Nephrology following for Hypokalemia, KEIRY .   Events over night:   Confused, still have diarrhea       BP (!) 113/37 (BP Location: Right arm, Patient Position: Lying) Comment: Nurse notified  Pulse 71   Temp 37.1 °C (98.8 °F) (Axillary)   Resp 18   Ht 1.575 m (5' 2\")   Wt 63.5 kg (140 lb)   SpO2 94%   BMI 25.61 kg/m²     Input / Output:  24 HR:   Intake/Output Summary (Last 24 hours) at 8/10/2025 1335  Last data filed at 8/10/2025 0956  Gross per 24 hour   Intake 1036.25 ml   Output --   Net 1036.25 ml       Physical Exam   Alert and oriented x 1   Neck: no JVD  CV: RRR  Lungs: CTA bilaterally  Abd: soft, NT, ND   Ext: no  lower extremity edema    Scheduled medications  Scheduled Medications[1]  Continuous medications  Continuous Medications[2]  PRN medications  PRN Medications[3]   Results from last 7 days   Lab Units 08/10/25  0647 08/08/25  0142 08/07/25 1959   SODIUM mmol/L 145   < > 145   POTASSIUM mmol/L 4.3   < > 2.5*   CHLORIDE mmol/L 119*   < > 108*   CO2 mmol/L 22   < > 28   BUN mg/dL 15   < > 14   CREATININE mg/dL 1.12*   < > 1.02   CALCIUM mg/dL 8.3*   < > 8.6   PROTEIN TOTAL g/dL  --   --  6.1*   BILIRUBIN TOTAL mg/dL  --   --  0.5   ALK PHOS U/L  --   --  137*   ALT U/L  --   --  5*   AST U/L  --   --  14   GLUCOSE mg/dL 78   < > 80    < > = values in this interval not displayed.      Results from last 7 days   Lab Units 08/08/25  1050 08/07/25  1959   MAGNESIUM mg/dL 1.87 1.84      Results from last 7 days   Lab Units 08/09/25  0654 08/07/25  1904   WBC AUTO x10*3/uL 13.9* 11.7*   HEMOGLOBIN g/dL 13.8 12.6   HEMATOCRIT % 45.8 39.9   PLATELETS AUTO x10*3/uL 260 251        Assessment & Plan:   Patient is a 84 y.o. female   here for Cdiff colitis . Nephrology consulted in view of KEIRY , Hypokalemia .    "   KEIRY   Hypokalemia   Encephalopathy  Dementia  Clostridium difficile infection-positive PCR,   Hyperlipidemia      Recommendations:   KEIRY , related to Diarrhea, and Poor oral intake , on IV fluid   Avoid nephrotoxin ,   Cont treatment for Cdiff   Hypokalemia , Urine K 16,  suggesting GI losses, currently related to Cdiff , chronically prob related to Laxative use.   K was replaced  Check BMP daily  , replace K as needed            Please message me through [employer] chat with any questions or concerns.     Hemalatha Juarez MD  8/10/2025  1:35 PM           [1] atorvastatin, 80 mg, oral, Nightly  donepezil, 10 mg, oral, Nightly  memantine, 5 mg, oral, Daily  metoprolol succinate XL, 25 mg, oral, Daily  pantoprazole, 40 mg, oral, Daily before breakfast  vancomycin, 250 mg, oral, 4x daily    [2]    [3] PRN medications: acetaminophen, albuterol, benzocaine-menthol, dextromethorphan-guaifenesin, hydrOXYzine HCL, melatonin, ondansetron, ondansetron ODT

## 2025-08-10 NOTE — NURSING NOTE
Patient sitting up in bed, happy, laughing and feeding herself dinner. Still denies any shortness of breath or itching. Doing well feeding herself just requires set up. Has already eaten all of her mac and cheese and working on the veggies now. Drank all of her juice and started on her milk as well.

## 2025-08-10 NOTE — CONSULTS
Inpatient consult to Neurology  Consult performed by: Carmella Owens MD  Consult ordered by: Zuly Snyder MD        General Neurology Consult Note    HPI  Riya Hernandez is a 84 y.o. female with PMH of Alzheimer's disease who presented to [facility] on 8/7/2025 with diarrhea, neurology consulted for bowel/bladder incontinence.    Pt with significant memory issues, unable to provider hx. She is in good spirit, denies any pain, HA, vision changes, weakness.   Per son, pt came in due to diarrhea. Pt has been living with her daughter, now moved into a nursing home. Has behavioral / agitation issues there. Pt has not walked for several months now; her gait has been progressively worsening, was needing a walker, but still had falls. Now non-ambulatory. Requires lots of care at facility.     Came in for bad diarrhea. This has been improving.     CT lumbar spine was done which was personally reviewed -- noted degenerative changes with spinal canal stenosis, likely the underlying cause of pt's BLE weakness / gait impairment.     Past Medical History  Medical History[1]  Surgical History  Surgical History[2]  Social History  Social History[3]  Allergies  Patient has no known allergies.    Home Medications  Current Outpatient Medications   Medication Instructions    acetaminophen (TYLENOL) 650 mg, oral, Every 4 hours PRN    acetaminophen (TYLENOL) 650 mg, oral, Every 4 hours PRN    albuterol 1.25 mg, nebulization, 3 times daily RT    atorvastatin (Lipitor) 80 mg tablet 1 tablet, oral, Nightly, For 90    benzocaine-menthol (Cepastat Sore Throat) lozenge 1 lozenge, Mouth/Throat, Every 2 hour PRN    dextromethorphan-guaifenesin (Robitussin DM)  mg/5 mL oral liquid 5 mL, oral, Every 4 hours PRN    donepezil (ARICEPT) 10 mg, oral, Nightly    hydrOXYzine pamoate (VISTARIL) 25 mg, oral, Every 8 hours PRN    melatonin 3 mg, oral, Nightly PRN    memantine (NAMENDA) 5 mg, oral, Daily    metoprolol succinate XL  "(TOPROL-XL) 25 mg, oral, Daily, Do not crush or chew.    ondansetron (ZOFRAN) 4 mg, intravenous, Every 8 hours PRN    ondansetron ODT (ZOFRAN-ODT) 4 mg, oral, Every 8 hours PRN    pantoprazole (PROTONIX) 40 mg, oral, Daily before breakfast, Do not crush, chew, or split.          Objective   24h Vitals  Heart Rate:  [63-71]   Temp:  [36.1 °C (97 °F)-37.1 °C (98.8 °F)]   Resp:  [18]   BP: (113-118)/(37-42)   SpO2:  [94 %-95 %]      Physical Exam  Neurological Exam  Physical Exam  Oriented to [facility], but not year or month or season, does not know why she is in the hospital  Follows commands, no aphasia/dysarthria.  PERRL, VFF,  EOMI, normal saccades and pursuit, no gaze preference/nystagmus.   Intact facial sensation, no asymmetry. Intact hearing bilaterally. Tongue midline. Symmetric palate elevation.   Motor: 5/5 BUE tested proximally and distally. BLE 2/5 at the hips, knees and 3/5 at the ankles.   Sensation: intact to light touch throughout the legs   Cerebellar: intact finger to nose. BLE limited mobility.   Gait: deferred -- pt non-ambulatory       Recent Labs  Results from last 72 hours   Lab Units 08/10/25  0647 08/09/25  1743 08/09/25  0654 08/08/25  1742 08/08/25  1050 08/08/25  0142 08/07/25  1959   SODIUM mmol/L 145 145 148*  --  144   < > 145   POTASSIUM mmol/L 4.3 4.5 5.2   < > 2.5*   < > 2.5*   BUN mg/dL 15 14 12  --  12   < > 14   CREATININE mg/dL 1.12* 1.21* 1.22*  --  1.06*   < > 1.02   CALCIUM mg/dL 8.3* 8.4* 8.8  --  8.2*   < > 8.6   MAGNESIUM mg/dL  --   --   --   --  1.87  --  1.84    < > = values in this interval not displayed.      Results from last 72 hours   Lab Units 08/09/25  0654 08/07/25  1904   WBC AUTO x10*3/uL 13.9* 11.7*   HEMOGLOBIN g/dL 13.8 12.6   HEMATOCRIT % 45.8 39.9   PLATELETS AUTO x10*3/uL 260 251            No results found for: \"HGBA1C\", \"LDLF\"     Imaging  MRI: No MRI brain results found for the past 12 months  CT Head: No CT head results found for the past 12 " months  CTA: No CT Angio Head Results found for the past  year  Echo: No echocardiogram results found for the past 12 months             Assessment/Plan   Riya Hernandez is a 84 y.o. female with PMH of Alzheimer's disease who presented to [facility] on 8/7/2025 with diarrhea, neurology consulted for bowel/bladder incontinence. Pt has been nonambulatory for several months. CT L spine shows advanced degenerative changes with spinal canal stenosis. Pt is a poor surgical candidate.     Diagnoses:  Lumbar spinal degenerative disease  Alzheimer's disease     Recommendations:  No further acute neurological evaluation necessary   Spoke with son by bedside     Thank you for the consult. Will sign off. Please do not hesitate to reach out with any questions or any new change in patient's neurological status.       I personally spent 70 minutes today, exclusive of procedures, providing care for this patient, including preparation, face to face time, documentation and other services such as review of medical records, diagnostic result, patient education, counseling, coordination of care as specified in the encounter.     Carmella Owens MD   Neurology and Vascular Neurology         [1]   Past Medical History:  Diagnosis Date    Alzheimer disease (Multi)     AMI (acute myocardial infarction) (Multi)     Anemia     Anxiety     Arrhythmia     ASHD (arteriosclerotic heart disease)     At risk for falling     Chest pain     COPD (chronic obstructive pulmonary disease) (Multi)     Coronary artery disease     Delirium     Delirium     Dementia     GI bleed     Hyperlipidemia     Hypertension     Lightheadedness     Myocardial infarction (Multi)     Near syncope     Osteoarthritis     Rhabdomyolysis     Syncope     Urinary tract infection     Weakness    [2]   Past Surgical History:  Procedure Laterality Date    CARDIAC CATHETERIZATION      CORONARY STENT PLACEMENT     [3]   Social History  Tobacco Use    Smoking status: Every Day      Current packs/day: 0.25     Average packs/day: 0.3 packs/day for 10.0 years (2.5 ttl pk-yrs)     Types: Cigarettes     Passive exposure: Never    Smokeless tobacco: Never   Substance Use Topics    Alcohol use: Defer    Drug use: Never

## 2025-08-10 NOTE — NURSING NOTE
Patient was checked and found to have had a BM. Patient was cleaned up and repositioned in bed. Tolerated fairly well, family member is also present at the bedside. Patient's lunch tray arrived so it was placed in front of her, assisted in tray set up but then patient began feeding herself so will let her do as much as she is able by herself.

## 2025-08-10 NOTE — CARE PLAN
Problem: Pain - Adult  Goal: Verbalizes/displays adequate comfort level or baseline comfort level  Outcome: Progressing   The patient's goals for the shift include remain safe and comfortable    The clinical goals for the shift include pt will have less diarrhea

## 2025-08-10 NOTE — NURSING NOTE
Red rash noted to the groin and lower abdomen area, patient unable to tell me if this is new for her or not. Cleaned the area and took of the patient's brief to see if this helps alleviate any of the redness incase it is from irritation.

## 2025-08-10 NOTE — NURSING NOTE
In medicating patient and then checked to see if she was dry and found to be incontinent of urine so she was rolled to be cleaned up and noticed the rash from her groin/abdomen had spread to her back. Skin is warm to the touch but patient remains a febrile. Patient denied any shortness of breath or itchiness. Call placed to Dr. Snyder who requested I reach out to Infectious disease to see if they would like to switch her to another medication. Secure chat sent, waiting on response.

## 2025-08-10 NOTE — NURSING NOTE
Patient changed again after being incontinent of stool and rash appears about the same as earlier this evening. Patient did well and finished almost all of her dinner and finished drinking her milk as well. Still no meal supplement shakes. Called down to dietary and they said they do not see an order though it appears to be in on my side from Dr. Snyder will pass on to nightshift RN so they can make sure she gets one with breakfast tomorrow. Patient denied any other needs at this time. Call light is in reach.

## 2025-08-10 NOTE — PROGRESS NOTES
Riya Hernandez is a 84 y.o. female on day 2 of admission presenting with Diarrhea, unspecified type.      Subjective   BETTER ATE''WHERE AM I''       Objective     Last Recorded Vitals  BP (!) 113/37 (BP Location: Right arm, Patient Position: Lying) Comment: Nurse notified  Pulse 71   Temp 37.1 °C (98.8 °F) (Axillary)   Resp 18   Wt 63.5 kg (140 lb)   SpO2 94%   Intake/Output last 3 Shifts:    Intake/Output Summary (Last 24 hours) at 8/10/2025 0934  Last data filed at 2025 2148  Gross per 24 hour   Intake 616.25 ml   Output --   Net 616.25 ml       Admission Weight  Weight: 63.5 kg (140 lb) (25 1822)    Daily Weight  25 : 63.5 kg (140 lb)    Image Results  ECG 12 lead  Sinus bradycardia  Possible Right ventricular hypertrophy  Nonspecific ST abnormality  Abnormal ECG  Confirmed by Anel Polanco ([id number]) on 2025 10:48:18 PM  CT lumbar spine wo IV contrast  Narrative: Interpreted By:  Allison Lowery,   STUDY:  CT LUMBAR SPINE WO IV CONTRAST;  2025 12:45 pm      INDICATION:  Signs/Symptoms:unable to walk, weak, bowel and urine incontinence  right foot droop      COMPARISON:  CT abdomen and pelvis 2025 CT hip 2023.      ACCESSION NUMBER(S):  [id number]      ORDERING CLINICIAN:  LAZ ROONEY      TECHNIQUE:  Noncontrast CT axial images were obtained through the lumbar spine.  Coronal and sagittal reformats were performed.      FINDINGS:  NUMBERIN lumbar type vertebral bodies.      ALIGNMENT: Normal.      VERTEBRAE: Compression deformity of the L4 vertebral body with a  proximally 30% loss of vertebral body height; this is of uncertain  chronicity but is new from .  No suspicious osseous lesions.  Significant hypodensity of the L1 vertebral body consistent with  osteoporosis.      SOFT TISSUES: The prevertebral soft tissues are unremarkable.      LUMBAR DISC SPACES:  L1-2: Disc bulge, bilateral facet arthropathy and ligamentum flavum  thickening. No spinal canal or foraminal  narrowing.      L2-3: Disc bulge, bilateral facet arthropathy and ligamentum flavum  thickening. Mild spinal canal stenosis. No foraminal stenosis.      L3-4: Disc bulge, bilateral facet arthropathy, ligamentum flavum  thickening. Severe spinal canal stenosis. No foraminal stenosis.      L4-5: Disc bulge, bilateral facet arthropathy, ligamentum flavum  thickening. Severe spinal canal stenosis. Mild bilateral foraminal  stenosis.      L5-S1: Disc bulge, bilateral facet arthropathy. No spinal canal  stenosis. Mild bilateral foraminal stenosis.      OTHER: Extensive vascular calcifications.      Impression: 1. Diffuse degenerative changes throughout the lumbar spine, worse at  L3-L4 and L4-L5 where there is severe spinal canal stenosis. No  significant foraminal stenosis.  2. Indeterminate age compression fracture of L4 with approximately  30% loss of vertebral body height.  3. Findings consistent with osteoporosis; this can be confirmed with  DEXA scan.          I personally reviewed the images/study and I agree with the findings  as stated.      MACRO:  None.      Signed by: Allison Lowery 8/9/2025 1:11 PM  Dictation workstation:   [id number]      Physical Exam/ALERT PLEASANT ABLE TO EAT BY HERSELF    Relevant Results                            Assessment & Plan  Diarrhea, unspecified type    UNABLE TO WALK, FOOT DROOP, THE CT LUMBAR WITH ARTHRITIS           Zuly Snyder MD

## 2025-08-10 NOTE — CARE PLAN
The patient's goals for the shift include remain safe and comfortable    The clinical goals for the shift include Decrease in BMs    Over the shift, the patient did not make progress toward the following goals. Barriers to progression include none. Recommendations to address these barriers include continue to check the patient for incontinence to prevent skin breakdown.

## 2025-08-11 LAB
ALBUMIN SERPL BCP-MCNC: 2.8 G/DL (ref 3.4–5)
ALP SERPL-CCNC: 137 U/L (ref 33–136)
ALT SERPL W P-5'-P-CCNC: 9 U/L (ref 7–45)
ANION GAP SERPL CALCULATED.3IONS-SCNC: 8 MMOL/L (ref 10–20)
AST SERPL W P-5'-P-CCNC: 20 U/L (ref 9–39)
BASOPHILS # BLD AUTO: 0.01 X10*3/UL (ref 0–0.1)
BASOPHILS NFR BLD AUTO: 0.1 %
BILIRUB SERPL-MCNC: 0.4 MG/DL (ref 0–1.2)
BUN SERPL-MCNC: 15 MG/DL (ref 6–23)
CALCIUM SERPL-MCNC: 8.6 MG/DL (ref 8.6–10.3)
CHLORIDE SERPL-SCNC: 113 MMOL/L (ref 98–107)
CO2 SERPL-SCNC: 26 MMOL/L (ref 21–32)
CREAT SERPL-MCNC: 0.98 MG/DL (ref 0.5–1.05)
EGFRCR SERPLBLD CKD-EPI 2021: 56 ML/MIN/1.73M*2
EOSINOPHIL # BLD AUTO: 0.79 X10*3/UL (ref 0–0.4)
EOSINOPHIL NFR BLD AUTO: 7.3 %
ERYTHROCYTE [DISTWIDTH] IN BLOOD BY AUTOMATED COUNT: 17 % (ref 11.5–14.5)
GLUCOSE SERPL-MCNC: 119 MG/DL (ref 74–99)
HCT VFR BLD AUTO: 45.1 % (ref 36–46)
HGB BLD-MCNC: 14 G/DL (ref 12–16)
IMM GRANULOCYTES # BLD AUTO: 0.06 X10*3/UL (ref 0–0.5)
IMM GRANULOCYTES NFR BLD AUTO: 0.6 % (ref 0–0.9)
LYMPHOCYTES # BLD AUTO: 1.19 X10*3/UL (ref 0.8–3)
LYMPHOCYTES NFR BLD AUTO: 10.9 %
MCH RBC QN AUTO: 29.1 PG (ref 26–34)
MCHC RBC AUTO-ENTMCNC: 31 G/DL (ref 32–36)
MCV RBC AUTO: 94 FL (ref 80–100)
MONOCYTES # BLD AUTO: 0.4 X10*3/UL (ref 0.05–0.8)
MONOCYTES NFR BLD AUTO: 3.7 %
NEUTROPHILS # BLD AUTO: 8.42 X10*3/UL (ref 1.6–5.5)
NEUTROPHILS NFR BLD AUTO: 77.4 %
NRBC BLD-RTO: 0 /100 WBCS (ref 0–0)
PLATELET # BLD AUTO: 251 X10*3/UL (ref 150–450)
POTASSIUM SERPL-SCNC: 4.4 MMOL/L (ref 3.5–5.3)
PROT SERPL-MCNC: 5.8 G/DL (ref 6.4–8.2)
RBC # BLD AUTO: 4.81 X10*6/UL (ref 4–5.2)
SODIUM SERPL-SCNC: 143 MMOL/L (ref 136–145)
WBC # BLD AUTO: 10.9 X10*3/UL (ref 4.4–11.3)

## 2025-08-11 PROCEDURE — 80053 COMPREHEN METABOLIC PANEL: CPT | Performed by: INTERNAL MEDICINE

## 2025-08-11 PROCEDURE — 2500000001 HC RX 250 WO HCPCS SELF ADMINISTERED DRUGS (ALT 637 FOR MEDICARE OP): Performed by: INTERNAL MEDICINE

## 2025-08-11 PROCEDURE — 36415 COLL VENOUS BLD VENIPUNCTURE: CPT | Performed by: INTERNAL MEDICINE

## 2025-08-11 PROCEDURE — 85025 COMPLETE CBC W/AUTO DIFF WBC: CPT | Performed by: INTERNAL MEDICINE

## 2025-08-11 PROCEDURE — 1200000002 HC GENERAL ROOM WITH TELEMETRY DAILY

## 2025-08-11 RX ORDER — METRONIDAZOLE 500 MG/1
500 TABLET ORAL EVERY 8 HOURS SCHEDULED
Status: DISCONTINUED | OUTPATIENT
Start: 2025-08-11 | End: 2025-08-12 | Stop reason: HOSPADM

## 2025-08-11 RX ADMIN — ATORVASTATIN CALCIUM 80 MG: 80 TABLET, FILM COATED ORAL at 21:20

## 2025-08-11 RX ADMIN — MEMANTINE 5 MG: 5 TABLET ORAL at 10:06

## 2025-08-11 RX ADMIN — METRONIDAZOLE 500 MG: 500 TABLET ORAL at 14:12

## 2025-08-11 RX ADMIN — METRONIDAZOLE 500 MG: 500 TABLET ORAL at 21:20

## 2025-08-11 RX ADMIN — PANTOPRAZOLE SODIUM 40 MG: 40 TABLET, DELAYED RELEASE ORAL at 10:06

## 2025-08-11 RX ADMIN — DONEPEZIL HYDROCHLORIDE 10 MG: 10 TABLET, FILM COATED ORAL at 21:20

## 2025-08-11 ASSESSMENT — COGNITIVE AND FUNCTIONAL STATUS - GENERAL
MOVING TO AND FROM BED TO CHAIR: A LOT
TURNING FROM BACK TO SIDE WHILE IN FLAT BAD: A LITTLE
PERSONAL GROOMING: A LOT
DAILY ACTIVITIY SCORE: 13
MOVING FROM LYING ON BACK TO SITTING ON SIDE OF FLAT BED WITH BEDRAILS: A LITTLE
MOBILITY SCORE: 12
WALKING IN HOSPITAL ROOM: TOTAL
HELP NEEDED FOR BATHING: A LOT
EATING MEALS: A LITTLE
DRESSING REGULAR UPPER BODY CLOTHING: A LOT
TOILETING: A LOT
STANDING UP FROM CHAIR USING ARMS: A LOT
DRESSING REGULAR LOWER BODY CLOTHING: A LOT
CLIMB 3 TO 5 STEPS WITH RAILING: TOTAL

## 2025-08-11 ASSESSMENT — PAIN SCALES - PAIN ASSESSMENT IN ADVANCED DEMENTIA (PAINAD)
BODYLANGUAGE: RELAXED
FACIALEXPRESSION: SMILING OR INEXPRESSIVE
CONSOLABILITY: NO NEED TO CONSOLE
TOTALSCORE: 0
BREATHING: NORMAL

## 2025-08-11 NOTE — NURSING NOTE
Into assess patient. Patient had a BM and was cleaned and changed, new bedding and underpad provided. No purewick or depends, patient refused. Bed alarm on and in low position, call light and phoine in reach, no additional issues or concerns, all needs met at this time nurse to continue to monitor and assess

## 2025-08-11 NOTE — CARE PLAN
The patient's goals for the shift include remain safe and comfortable    The clinical goals for the shift include safety, antibiotics, monitor vitals

## 2025-08-11 NOTE — PROGRESS NOTES
08/11/25 1308   Discharge Planning   Home or Post Acute Services Post acute facilities (Rehab/SNF/etc)   Type of Post Acute Facility Services Long term care;Other (Comment)  (Dr. Snyder considering adding PT OT so that once they are returned to LTC, the LTC facility may attempt to get some skilled time approved for this patient. Pt will return to [place] under long term care benefit)   Expected Discharge Disposition Inter  (plan to return under long term care; +/- PT OT notes so that the long term care faciltiy can decide to attempt auth for some skilled time. This will not hold up discharge.)   Does the patient need discharge transport arranged? Yes   [facility] coordination needed? Yes   Has discharge transport been arranged? No  (Not medically ready; ID may clear for discharge tomorrow. CM awaiting decision from Dr. Snyder regarding adding PT OT at this time)     Dispo: Return to [facility] under long term care   GARY +1    Facility updated per allscripts     Discharge plan NOT finalized. Please contact [facility] prior to attempting to discharge this patient. Thank you.

## 2025-08-11 NOTE — PROGRESS NOTES
Riya Hernandez is a 84 y.o. female on day 3 of admission presenting with Diarrhea, unspecified type.      Subjective   Rash like''red men syndrome,from vanco'', vanco stopped       Objective     Last Recorded Vitals  /53 (BP Location: Right arm, Patient Position: Lying)   Pulse 84   Temp 36.6 °C (97.9 °F) (Axillary)   Resp 16   Wt 63.5 kg (140 lb)   SpO2 96%   Intake/Output last 3 Shifts:    Intake/Output Summary (Last 24 hours) at 2025 1705  Last data filed at 2025 1000  Gross per 24 hour   Intake 490 ml   Output 1 ml   Net 489 ml       Admission Weight  Weight: 63.5 kg (140 lb) (25 1822)    Daily Weight  25 : 63.5 kg (140 lb)    Image Results  ECG 12 lead  Sinus bradycardia  Possible Right ventricular hypertrophy  Nonspecific ST abnormality  Abnormal ECG  Confirmed by Anel Polanco ([id number]) on 2025 10:48:18 PM  CT lumbar spine wo IV contrast  Narrative: Interpreted By:  Allison Lowery,   STUDY:  CT LUMBAR SPINE WO IV CONTRAST;  2025 12:45 pm      INDICATION:  Signs/Symptoms:unable to walk, weak, bowel and urine incontinence  right foot droop      COMPARISON:  CT abdomen and pelvis 2025 CT hip 2023.      ACCESSION NUMBER(S):  [id number]      ORDERING CLINICIAN:  LAZ ROONEY      TECHNIQUE:  Noncontrast CT axial images were obtained through the lumbar spine.  Coronal and sagittal reformats were performed.      FINDINGS:  NUMBERIN lumbar type vertebral bodies.      ALIGNMENT: Normal.      VERTEBRAE: Compression deformity of the L4 vertebral body with a  proximally 30% loss of vertebral body height; this is of uncertain  chronicity but is new from .  No suspicious osseous lesions.  Significant hypodensity of the L1 vertebral body consistent with  osteoporosis.      SOFT TISSUES: The prevertebral soft tissues are unremarkable.      LUMBAR DISC SPACES:  L1-2: Disc bulge, bilateral facet arthropathy and ligamentum flavum  thickening. No spinal canal or  foraminal narrowing.      L2-3: Disc bulge, bilateral facet arthropathy and ligamentum flavum  thickening. Mild spinal canal stenosis. No foraminal stenosis.      L3-4: Disc bulge, bilateral facet arthropathy, ligamentum flavum  thickening. Severe spinal canal stenosis. No foraminal stenosis.      L4-5: Disc bulge, bilateral facet arthropathy, ligamentum flavum  thickening. Severe spinal canal stenosis. Mild bilateral foraminal  stenosis.      L5-S1: Disc bulge, bilateral facet arthropathy. No spinal canal  stenosis. Mild bilateral foraminal stenosis.      OTHER: Extensive vascular calcifications.      Impression: 1. Diffuse degenerative changes throughout the lumbar spine, worse at  L3-L4 and L4-L5 where there is severe spinal canal stenosis. No  significant foraminal stenosis.  2. Indeterminate age compression fracture of L4 with approximately  30% loss of vertebral body height.  3. Findings consistent with osteoporosis; this can be confirmed with  DEXA scan.          I personally reviewed the images/study and I agree with the findings  as stated.      MACRO:  None.      Signed by: Allison Lowrey 2025 1:11 PM  Dictation workstation:   [id number]      Physical Exam/rash improoving, daughter concerned her son  from''[place] from the iv dye allergies''    Relevant Results                            Assessment & Plan  Diarrhea, unspecified type    Red rash better,spoke with dr Romero CHANGE AT, STILL SOME DIARRHEA           Zuly Snyder MD

## 2025-08-11 NOTE — NURSING NOTE
Bed side shift report received. Patient resting comfortably. Patient asleep. Breathing even and unlabored. Call light within reach. This nurse assumed care. Bed alarm active. Bed in lowest position.

## 2025-08-11 NOTE — PROGRESS NOTES
Patient was seen for KEIRY and hypokalemia is being treated for osteoarthritis his creatinine level is down to 0.98 and potassium is 4.8 I will sign off please call if needed

## 2025-08-11 NOTE — NURSING NOTE
End of shift, bedside shift report given. Patient laying in bed resting comfortably, with call light within reach. Patient verbalizes no new concerns at this time. Patient's son at bedside. Bed alarm active. Bed at lowest position. SCD's in place.

## 2025-08-11 NOTE — PROGRESS NOTES
Riya Hernandez is a 84 y.o. female on day 3 of admission presenting with Diarrhea, unspecified type.    Subjective   Interval History:   Patient seen and examined  Awake  Interval development of rash involving abdominal and chest wall  No diarrhea reported        Review of Systems   Skin:  Positive for rash.       Objective   Range of Vitals (last 24 hours)  Heart Rate:  [62-74]   Temp:  [36.9 °C (98.4 °F)-37 °C (98.6 °F)]   Resp:  [18]   BP: (109-146)/(39-55)   SpO2:  [90 %-96 %]        Daily Weight  08/07/25 : 63.5 kg (140 lb)    Body mass index is 25.61 kg/m².    Physical Exam  Constitutional:       General: She is awake.   HENT:      Head: Normocephalic and atraumatic.      Right Ear: External ear normal.      Left Ear: External ear normal.      Nose: Nose normal.      Eyes:      General: No scleral icterus.     Extraocular Movements: Extraocular movements intact.      Conjunctiva/sclera: Conjunctivae normal.         Cardiovascular:      Rate and Rhythm: Normal rate and regular rhythm.      Heart sounds: Normal heart sounds.   Pulmonary:      Breath sounds: Normal breath sounds.   Abdominal:      Palpations: Abdomen is soft.      Tenderness: There is no abdominal tenderness.      Musculoskeletal:      Cervical back: Normal range of motion and neck supple.      Right lower leg: No edema.      Left lower leg: No edema.      Skin:     General: Erythematous maculopapular rash involving anterior chest and abdominal wall     Neurological:      Mental Status: She is awake     Psychiatric:         Behavior: Behavior is not agitated.     Antibiotics  vancomycin - 125 mg    Relevant Results  Labs  Results from last 72 hours   Lab Units 08/09/25  0654   WBC AUTO x10*3/uL 13.9*   HEMOGLOBIN g/dL 13.8   HEMATOCRIT % 45.8   PLATELETS AUTO x10*3/uL 260     Results from last 72 hours   Lab Units 08/10/25  0647 08/09/25  1743 08/09/25  0654   SODIUM mmol/L 145 145 148*   POTASSIUM mmol/L 4.3 4.5 5.2   CHLORIDE mmol/L 119*  "117* 118*   CO2 mmol/L 22 23 24   BUN mg/dL 15 14 12   CREATININE mg/dL 1.12* 1.21* 1.22*   GLUCOSE mg/dL 78 97 108*   CALCIUM mg/dL 8.3* 8.4* 8.8   ANION GAP mmol/L 8* 10 11   EGFR mL/min/1.73m*2 48* 44* 43*   PHOSPHORUS mg/dL 2.5  --   --      Results from last 72 hours   Lab Units 08/10/25  0647   ALBUMIN g/dL 2.8*     Estimated Creatinine Clearance: 31.6 mL/min (A) (by C-G formula based on SCr of 1.12 mg/dL (H)).  No results found for: \"CRP\"  Microbiology  Reviewed-negative urine culture  Imaging  ECG 12 lead  Result Date: 2025  Sinus bradycardia Possible Right ventricular hypertrophy Nonspecific ST abnormality Abnormal ECG Confirmed by Anel Polanco ([id number]) on 2025 10:48:18 PM    CT lumbar spine wo IV contrast  Result Date: 2025  Interpreted By:  Allison Lowery, STUDY: CT LUMBAR SPINE WO IV CONTRAST;  2025 12:45 pm   INDICATION: Signs/Symptoms:unable to walk, weak, bowel and urine incontinence right foot droop   COMPARISON: CT abdomen and pelvis 2025 CT hip 2023.   ACCESSION NUMBER(S): [id number]   ORDERING CLINICIAN: LAZ ROONEY   TECHNIQUE: Noncontrast CT axial images were obtained through the lumbar spine. Coronal and sagittal reformats were performed.   FINDINGS: NUMBERIN lumbar type vertebral bodies.   ALIGNMENT: Normal.   VERTEBRAE: Compression deformity of the L4 vertebral body with a proximally 30% loss of vertebral body height; this is of uncertain chronicity but is new from .  No suspicious osseous lesions. Significant hypodensity of the L1 vertebral body consistent with osteoporosis.   SOFT TISSUES: The prevertebral soft tissues are unremarkable.   LUMBAR DISC SPACES: L1-2: Disc bulge, bilateral facet arthropathy and ligamentum flavum thickening. No spinal canal or foraminal narrowing.   L2-3: Disc bulge, bilateral facet arthropathy and ligamentum flavum thickening. Mild spinal canal stenosis. No foraminal stenosis.   L3-4: Disc bulge, bilateral facet arthropathy, " ligamentum flavum thickening. Severe spinal canal stenosis. No foraminal stenosis.   L4-5: Disc bulge, bilateral facet arthropathy, ligamentum flavum thickening. Severe spinal canal stenosis. Mild bilateral foraminal stenosis.   L5-S1: Disc bulge, bilateral facet arthropathy. No spinal canal stenosis. Mild bilateral foraminal stenosis.   OTHER: Extensive vascular calcifications.       1. Diffuse degenerative changes throughout the lumbar spine, worse at L3-L4 and L4-L5 where there is severe spinal canal stenosis. No significant foraminal stenosis. 2. Indeterminate age compression fracture of L4 with approximately 30% loss of vertebral body height. 3. Findings consistent with osteoporosis; this can be confirmed with DEXA scan.     I personally reviewed the images/study and I agree with the findings as stated.   MACRO: None.   Signed by: Allison Lowery 8/9/2025 1:11 PM Dictation workstation:   [id number]    CT abdomen pelvis w IV contrast  Result Date: 8/7/2025  STUDY: CT Abdomen and Pelvis with IV Contrast; 08/07/2025 10:34 PM INDICATION: Diarrhea. COMPARISON: None. ACCESSION NUMBER(S): [id number] ORDERING CLINICIAN: GLEN GUERRA TECHNIQUE: CT of the abdomen and pelvis was performed.  Contiguous axial images were obtained at 3 mm slice thickness through the abdomen and pelvis. Coronal and sagittal reconstructions at 3 mm slice thickness were performed.  75 mL Omnipaque-350 was administered intravenously.  670 DICOM images received. FINDINGS: LOWER CHEST: No cardiomegaly.  No pericardial effusion.  Lung bases are clear.  ABDOMEN:  LIVER: No hepatomegaly.  Smooth surface contour.  Normal attenuation.  BILE DUCTS: No intrahepatic or extrahepatic biliary ductal dilatation.  GALLBLADDER: The gallbladder is unremarkable. STOMACH: No abnormalities identified.  PANCREAS: Numerous pancreatic tail cystic lesions including multiple clusters, for example measuring 1.6 cm (series 3 image 35).  SPLEEN: No splenomegaly or focal  splenic lesion.  ADRENAL GLANDS: No thickening or nodules.  KIDNEYS AND URETERS: 1.0 cm calculus in the right renal pelvis.  Left renal cortical scarring.  No hydronephrosis bilaterally.  PELVIS:  BLADDER: No abnormalities identified.  REPRODUCTIVE ORGANS: No abnormalities identified.  BOWEL: No abnormalities identified.  VESSELS: Abdominal aorta is normal in caliber.  Atherosclerotic vascular calcifications.  PERITONEUM/RETROPERITONEUM/LYMPH NODES: No free fluid.  No pneumoperitoneum.  No lymphadenopathy.  ABDOMINAL WALL: No abnormalities identified. SOFT TISSUES: No abnormalities identified.  BONES: Mild/moderate compression deformity of the L4 vertebral body. Multilevel degenerative changes.  Osteopenia.    1.Mild/moderate compression deformity of the L4 vertebral body. Correlate with point tenderness. 2.Numerous pancreatic tail cystic lesions including multiple clusters, for example measuring 1.6 cm. consider further evaluation with MRI/MRCP. 3.A 1.0 cm calculus in the right renal pelvis. No hydronephrosis bilaterally. Signed by Tapan Morales    XR chest 1 view  Result Date: 8/7/2025  STUDY: Chest Radiograph;  08/07/2025 at 9:37 PM INDICATION: Weakness. COMPARISON: None available. ACCESSION NUMBER(S): [id number] ORDERING CLINICIAN: GLEN GUERRA TECHNIQUE:  Frontal chest was obtained at 2137 hours. FINDINGS: CARDIOMEDIASTINAL SILHOUETTE: Cardiomediastinal silhouette is normal in size and configuration.  LUNGS: Lungs are clear.  ABDOMEN: No remarkable upper abdominal findings.  BONES: No acute osseous changes.    No acute pulmonary pathology. Signed by Joby Miranda MD     Assessment/Plan   Encephalopathy, resolving   Pyuria versus urinary tract infection, urine culture negative  Anterior abdominal wall rash, possible allergic reaction  Dementia  Clostridium difficile infection-positive PCR, EIA negative     Discontinue oral vancomycin  Metronidazole  Monitor rash  Monitor stool  Contact plus  precautions  Supportive care  Monitor temperature and WBC     This is a complex infectious disease issue and the following was performed today (for more details please see the above note): Management decisions reflecting the added complexity (e.g., changes in antimicrobial therapy, infection control strategies).     Pete Wilson MD

## 2025-08-11 NOTE — CARE PLAN
The patient's goals for the shift include remain safe and comfortable    The clinical goals for the shift include Decrease in BMs    Over the shift, the patient did not make progress toward the following goals. Barriers to progression include   . Recommendations to address these barriers include     Problem: Pain - Adult  Goal: Verbalizes/displays adequate comfort level or baseline comfort level  Outcome: Progressing     Problem: Safety - Adult  Goal: Free from fall injury  Outcome: Progressing     Problem: Discharge Planning  Goal: Discharge to home or other facility with appropriate resources  Outcome: Progressing     Problem: Chronic Conditions and Co-morbidities  Goal: Patient's chronic conditions and co-morbidity symptoms are monitored and maintained or improved  Outcome: Progressing     Problem: Nutrition  Goal: Nutrient intake appropriate for maintaining nutritional needs  Outcome: Progressing     Problem: Skin  Goal: Decreased wound size/increased tissue granulation at next dressing change  Outcome: Progressing  Goal: Participates in plan/prevention/treatment measures  Outcome: Progressing  Goal: Prevent/manage excess moisture  Outcome: Progressing  Goal: Prevent/minimize sheer/friction injuries  Outcome: Progressing  Goal: Promote/optimize nutrition  Outcome: Progressing  Goal: Promote skin healing  Outcome: Progressing     Problem: Fall/Injury  Goal: Not fall by end of shift  Outcome: Progressing  Goal: Be free from injury by end of the shift  Outcome: Progressing  Goal: Verbalize understanding of personal risk factors for fall in the hospital  Outcome: Progressing  Goal: Verbalize understanding of risk factor reduction measures to prevent injury from fall in the home  Outcome: Progressing  Goal: Use assistive devices by end of the shift  Outcome: Progressing  Goal: Pace activities to prevent fatigue by end of the shift  Outcome: Progressing   .

## 2025-08-12 VITALS
BODY MASS INDEX: 25.76 KG/M2 | WEIGHT: 140 LBS | OXYGEN SATURATION: 97 % | RESPIRATION RATE: 16 BRPM | HEIGHT: 62 IN | TEMPERATURE: 97.9 F | SYSTOLIC BLOOD PRESSURE: 111 MMHG | DIASTOLIC BLOOD PRESSURE: 47 MMHG | HEART RATE: 65 BPM

## 2025-08-12 PROCEDURE — 2500000001 HC RX 250 WO HCPCS SELF ADMINISTERED DRUGS (ALT 637 FOR MEDICARE OP): Performed by: INTERNAL MEDICINE

## 2025-08-12 PROCEDURE — 97162 PT EVAL MOD COMPLEX 30 MIN: CPT | Mod: GP

## 2025-08-12 PROCEDURE — 99232 SBSQ HOSP IP/OBS MODERATE 35: CPT | Performed by: STUDENT IN AN ORGANIZED HEALTH CARE EDUCATION/TRAINING PROGRAM

## 2025-08-12 PROCEDURE — 97530 THERAPEUTIC ACTIVITIES: CPT | Mod: GO

## 2025-08-12 PROCEDURE — 97166 OT EVAL MOD COMPLEX 45 MIN: CPT | Mod: GO

## 2025-08-12 PROCEDURE — 97530 THERAPEUTIC ACTIVITIES: CPT | Mod: GP

## 2025-08-12 RX ORDER — METRONIDAZOLE 500 MG/1
500 TABLET ORAL EVERY 8 HOURS SCHEDULED
Qty: 42 TABLET | Refills: 0 | Status: SHIPPED | OUTPATIENT
Start: 2025-08-12 | End: 2025-08-26

## 2025-08-12 RX ADMIN — MEMANTINE 5 MG: 5 TABLET ORAL at 09:32

## 2025-08-12 RX ADMIN — METRONIDAZOLE 500 MG: 500 TABLET ORAL at 06:48

## 2025-08-12 RX ADMIN — METOPROLOL SUCCINATE 25 MG: 25 TABLET, EXTENDED RELEASE ORAL at 09:32

## 2025-08-12 RX ADMIN — PANTOPRAZOLE SODIUM 40 MG: 40 TABLET, DELAYED RELEASE ORAL at 06:48

## 2025-08-12 ASSESSMENT — COGNITIVE AND FUNCTIONAL STATUS - GENERAL
TURNING FROM BACK TO SIDE WHILE IN FLAT BAD: A LITTLE
TOILETING: TOTAL
PERSONAL GROOMING: A LOT
WALKING IN HOSPITAL ROOM: TOTAL
DRESSING REGULAR UPPER BODY CLOTHING: A LOT
MOVING FROM LYING ON BACK TO SITTING ON SIDE OF FLAT BED WITH BEDRAILS: TOTAL
MOVING FROM LYING ON BACK TO SITTING ON SIDE OF FLAT BED WITH BEDRAILS: A LITTLE
DAILY ACTIVITIY SCORE: 12
MOBILITY SCORE: 12
MOBILITY SCORE: 6
TURNING FROM BACK TO SIDE WHILE IN FLAT BAD: TOTAL
DAILY ACTIVITIY SCORE: 13
EATING MEALS: A LITTLE
EATING MEALS: A LITTLE
STANDING UP FROM CHAIR USING ARMS: A LOT
HELP NEEDED FOR BATHING: A LOT
DRESSING REGULAR LOWER BODY CLOTHING: TOTAL
CLIMB 3 TO 5 STEPS WITH RAILING: TOTAL
CLIMB 3 TO 5 STEPS WITH RAILING: TOTAL
STANDING UP FROM CHAIR USING ARMS: TOTAL
HELP NEEDED FOR BATHING: A LOT
DRESSING REGULAR LOWER BODY CLOTHING: A LOT
WALKING IN HOSPITAL ROOM: TOTAL
MOVING TO AND FROM BED TO CHAIR: TOTAL
PERSONAL GROOMING: A LITTLE
MOVING TO AND FROM BED TO CHAIR: A LOT
DRESSING REGULAR UPPER BODY CLOTHING: A LOT
TOILETING: A LOT

## 2025-08-12 ASSESSMENT — PAIN - FUNCTIONAL ASSESSMENT
PAIN_FUNCTIONAL_ASSESSMENT: 0-10

## 2025-08-12 ASSESSMENT — PAIN SCALES - GENERAL
PAINLEVEL_OUTOF10: 0 - NO PAIN

## 2025-08-12 ASSESSMENT — ACTIVITIES OF DAILY LIVING (ADL)
ADL_ASSISTANCE: NEEDS ASSISTANCE
ADL_ASSISTANCE: NEEDS ASSISTANCE
BATHING_ASSISTANCE: MODERATE

## 2025-08-12 NOTE — CARE PLAN
Problem: Grooming  Goal: LTG - Patient will complete daily grooming tasks with set up and close supervision  Outcome: Progressing     Problem: OT Transfers  Goal: LTG - Patient will transfer from one surface to another with minimal assist and 2ww  Outcome: Progressing     Problem: Therapeutic Activity  Goal: OT Goal 1 patient will be oriented x 3 75% of time with maximum visual/environmental cues.   Outcome: Progressing

## 2025-08-12 NOTE — CARE PLAN
October 10, 2024       Jose Armando Goodman MD   [facility] [address]  Via Fax: [phone]      Patient: Elijah Marino   YOB: 1978   Date of Visit: 10/10/2024       Dear Dr. Goodman:    Thank you for referring Elijah Marino to me for evaluation. Below are my notes for this visit with him.    If you have questions, please do not hesitate to call me. I look forward to following your patient along with you.      Sincerely,        Chavo Deluna MD        CC: No Recipients  Chavo Deluna MD  10/10/2024  4:27 PM  Signed    [facility]  [address], [place] [address]  P [phone]  F [phone]  ELECTROPHYSIOLOGY PROGRESS NOTE         PATIENT:  Elijah Marino   : 1978    CHIEF COMPLAINT  No chief complaint on file.     Referring Physician: Jose Armando Goodman MD     HISTORY OF PRESENT ILLNESS  Mr. Marino is a pleasant 42 year old male with recurrent PAF and NICM who underwent cryo PVI on 3/31/21. He had his 1 month visit and was doing well. He has no acute complaints. He underwent EPS also during his ablation that showed no evidence of an accessory pathway or secondary trigger.     MEDICATIONS  Prior to Admission medications    Medication Sig Start Date End Date Taking? Authorizing Provider   Entresto 24-26 MG per tablet TAKE 1 TABLET BY MOUTH TWICE DAILY 24   Silvestre Leyva CNP   atorvastatin (LIPITOR) 10 MG tablet Take 1 tablet by mouth daily.  Patient taking differently: Take 10 mg by mouth every evening. 3/19/24   Lowell Garcia MD   metoPROLOL succinate (TOPROL-XL) 50 MG 24 hr tablet Take 1 tablet by mouth 2 times daily. 23  Lowell Garcia MD   apixaBAN (ELIQUIS) 5 MG Tab Take 1 tablet by mouth every 12 hours. 21   Lowell Garcia MD     The patient's current medications were reviewed.    ALLERGIES  ALLERGIES:  No Known Allergies     HISTORIES  Past Medical History:   Diagnosis Date   • A-fib  (CMD)     DENIES CP.    Problem: Pain - Adult  Goal: Verbalizes/displays adequate comfort level or baseline comfort level  Outcome: Progressing  Flowsheets (Taken 8/12/2025 0152)  Verbalizes/displays adequate comfort level or baseline comfort level:   Notify Licensed Independent Practitioner if interventions unsuccessful or patient reports new pain   Consider cultural and social influences on pain and pain management   Implement non-pharmacological measures as appropriate and evaluate response   Administer analgesics based on type and severity of pain and evaluate response   Assess pain using appropriate pain scale   Encourage patient to monitor pain and request assistance     Problem: Safety - Adult  Goal: Free from fall injury  Outcome: Progressing  Flowsheets (Taken 8/12/2025 0152)  Free from fall injury:   Based on caregiver fall risk screen, instruct family/caregiver to ask for assistance with transferring infant if caregiver noted to have fall risk factors   Instruct family/caregiver on patient safety     Problem: Discharge Planning  Goal: Discharge to home or other facility with appropriate resources  Outcome: Progressing  Flowsheets (Taken 8/12/2025 0152)  Discharge to home or other facility with appropriate resources:   Refer to discharge planning if patient needs post-hospital services based on physician order or complex needs related to functional status, cognitive ability or social support system   Identify discharge learning needs (meds, wound care, etc)   Arrange for needed discharge resources and transportation as appropriate   Identify barriers to discharge with patient and caregiver     Problem: Chronic Conditions and Co-morbidities  Goal: Patient's chronic conditions and co-morbidity symptoms are monitored and maintained or improved  Outcome: Progressing  Flowsheets (Taken 8/12/2025 0152)  Care Plan - Patient's Chronic Conditions and Co-Morbidity Symptoms are Monitored and Maintained or Improved:   Update acute care   >4METS   • Blood clot associated with vein wall inflammation    • Chronic diastolic heart failure  (CMD)    • COVID-19 virus detected 11/30/2020    no s/s now    • Dilated cardiomyopathy  (CMD)    • Essential (primary) hypertension    • Hyperlipidemia    • Myocardial infarction  (CMD) 11/23/2020   • Stroke  (CMD)     no deficits       Past Surgical History:   Procedure Laterality Date   • Cardiac catherization      NO STENTS   • Cardiac electrophysiology mapping and ablation  03/2021   • Cardioversion         FAMILY HISTORY  Family History   Problem Relation Age of Onset   • Patient is unaware of any medical problems Mother    • Patient is unaware of any medical problems Father        SOCIAL HISTORY  Social History     Tobacco Use   • Smoking status: Never   • Smokeless tobacco: Never   Vaping Use   • Vaping status: never used   Substance Use Topics   • Alcohol use: Never   • Drug use: Never        REVIEW OF SYSTEMS    Constitutional: Negative for fatigue.   HENT: Negative for congestion and ear discharge.    Eyes: Negative for discharge.   Respiratory: Negative for shortness of breath.    Cardiovascular: Negative for chest pain.  Gastrointestinal: Negative for abdominal distention, nausea or vomiting.   Endocrine: Negative for polyphagia.   Genitourinary: Negative for hematuria.   Musculoskeletal: Negative for arthralgias.   Skin: Negative for color change.   Allergic/Immunologic: Negative for environmental allergies.   Neurological: Negative for seizures.   Hematological: Does not bruise/bleed easily.   Psychiatric/Behavioral: Negative for behavioral problems.     PHYSICAL EXAM  There were no vitals taken for this visit.        Constitutional: Appears well-developed and well-nourished.   Head: Normocephalic.   Mouth/Throat: Mucous membranes are normal.   Eyes: Conjunctivae are normal. Pupils are equal, round, and reactive to light.   Neck: Normal range of motion. Neck supple. No JVD present. Carotid bruit is not  plan with appropriate goals if chronic or comorbid symptoms are exacerbated and prevent overall improvement and discharge   Collaborate with multidisciplinary team to address chronic and comorbid conditions and prevent exacerbation or deterioration   Monitor and assess patient's chronic conditions and comorbid symptoms for stability, deterioration, or improvement     Problem: Skin  Goal: Decreased wound size/increased tissue granulation at next dressing change  Outcome: Progressing  Flowsheets (Taken 8/12/2025 0152)  Decreased wound size/increased tissue granulation at next dressing change:   Promote sleep for wound healing   Protective dressings over bony prominences   Utilize specialty bed per algorithm  Goal: Participates in plan/prevention/treatment measures  Outcome: Progressing  Flowsheets (Taken 8/12/2025 0152)  Participates in plan/prevention/treatment measures:   Discuss with provider PT/OT consult   Elevate heels   Increase activity/out of bed for meals  Goal: Prevent/manage excess moisture  Outcome: Progressing  Flowsheets (Taken 8/12/2025 0152)  Prevent/manage excess moisture:   Monitor for/manage infection if present   Use wicking fabric (obtain order)   Follow provider orders for dressing changes   Moisturize dry skin   The patient's goals for the shift include remain safe and comfortable    The clinical goals for the shift include saftey, abx, montor vitals         present.   Cardiovascular: RRR nl S1S2, no m/r/g, no LE edema, no JVD  Pulmonary/Chest: Effort normal and breath sounds normal. No respiratory distress. No wheezes, rhonchi, rales, no tenderness.   Abdominal: Soft. Normal appearance and bowel sounds are normal. There is no tenderness.   Musculoskeletal: Normal range of motion. No joint swelling   Neurological: Grossly normal. Alert and oriented to person, place, and time.   Skin: Skin is warm, dry and intact. No rash noted. No erythema.   Psychiatric: Normal mood and affect. Behavior is normal.     CARDIAC TESTING/IMAGING  I have reviewed the pertinent imaging study reports. These are the pertinent findings:  Ablation Atrial Fib (03/31/2021): Successful cryoablation for paroxysmal atrial fibrillation with isolation of 4 out of 4 pulmonary veins and demonstration of persistent entrance and exit block. No evidence of Accessory pathway, slow pathway or inducible tachycardia     TTE (03/31/2021): Left ventricle: The cavity size is normal. Wall thickness is normal. The ejection fraction is 55%. Left atrium: The atrium is moderately dilated. There is no evidence of  a thrombus in the atrial cavity or appendage. Atrial septum: There is a medium-sized patent foramen ovale.     Coronary angiography (02/23/2021): “The ejection fraction is estimated to be 50%. There is no aortic valve stenosis. There is no mitral valve stenosis, no mitral valve regurgitation and no mitral valve prolapse evident. Normal coronary arteries Mildly dilated left ventricle with borderline normal ejection fraction. No left ventricular apical aneurysm noted in the JOSEPH projection. Normal left heart hemodynamics. The plan is for the patient to resume his anticoagulation later tonight presuming no abnormality at the entry site. The patient continues on his treatment for congestive heart failure and is on track to undergo pulmonary vein isolation given history of atrial fibrillation and arrhythmia mediated  cardiomyopathy. 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     TTE (02/17/2021): “Left ventricle: Systolic function is mildly reduced. The ejection fraction was measured by visual estimation. The ejection fraction is 45-50%. Compared to echocardiogram report on 1/7/21, there has been an improvement in the left ventricular systolic function.”     ASSESSMENT/PLAN  Mr. Marino is a pleasant 42 year old male with recurrent PAF and NICM who underwent cryo PVI on 3/31/21. He had his 1 month visit and was doing well. He has no acute complaints. He underwent EPS also during his ablation that showed no evidence of an accessory pathway or secondary trigger.      1) Persistent atrial fibrillation (CMS/HCC)             - s/p EPS showing no evidence of Accessory pathway or SVT trigger for Afib.              - S/p Cryo PVI 3/21- future ablations should be done with RF or PFA as we did transiently loose phrenic capture with ablation at the [facility].              - CHADSVASC is 4 (Stroke, cardiomyopathy, HTN). We will continue anticoagulation.             - He was once again in Afib and  underwent DCCV on 1/7/22 and went back into afib again in 8/24 and post DCCV again now.    - Today he remains in NSR and feels well. Follow up 6 months time if still NSR then annually.       2) Atherosclerotic heart disease of native coronary artery without angina pectoris             Previous CT angiography reviewed. Due to poor imaging quality, I am unconvinced as to the presence of underlying coronary artery disease.              - Coronary angiogram - unremarkable      3) Chronic systolic heart failure (CMS/HCC)             - He has been on GDMT for > 3 months time and his most recent EF on ECHo yesterday was. 45-50%.              - Thus he dos not require  ICD implantation and his lifevest was discontinued.      4) Dilated cardiomyopathy (CMS/HCC)               - he is being followed by our general cardiology team for this and the plans will be per there recommendations.        Chavo Deluna MD  Cardiology, Cardiac Electrophysiology  [facility]  [facility]               No

## 2025-08-12 NOTE — PROGRESS NOTES
Occupational Therapy    Evaluation/Treatment    Patient Name: Riya Hernandez  MRN: [id number]  Department: [address]  Room: [address] [place] [address]  Today's Date: 08/12/25  Time Calculation  Start Time: 0830  Stop Time: 0853  Time Calculation (min): 23 min       Assessment:  OT Assessment: Referral received, chart reviewed, and evaluation complete. Presents from SNF with weakness, impaired cognition, balance and coordination.  Would benefit from acute OT services.  Recommend moderate intensity upon discharge  Prognosis: Good  Barriers to Discharge Home: No anticipated barriers  Evaluation/Treatment Tolerance: Patient tolerated treatment well  Medical Staff Made Aware: Yes  End of Session Communication: Bedside nurse  End of Session Patient Position: Up in chair, Alarm on  OT Assessment Results: Decreased ADL status, Decreased cognition, Decreased endurance, Decreased functional mobility  Prognosis: Good  Evaluation/Treatment Tolerance: Patient tolerated treatment well  Medical Staff Made Aware: Yes  Barriers to Participation: Comorbidities  Plan:  OT Frequency: 3 times per week  OT Discharge Recommendations: Moderate intensity level of continued care  Equipment Recommended upon Discharge: Wheelchair  OT Recommended Transfer Status: Maximum assist, Assist of 1  OT - OK to Discharge: Yes       Subjective   Current Problem:  1. Diarrhea, unspecified type        2. Hypokalemia        3. Pancreatic cyst ([facility])          OT Visit Info:  OT Received On: 08/12/25  General Visit Info:   General  Reason for Referral: decreased functional status due to Cdiff infection  Referred By: Zuly Snyder MD  Past Medical History Relevant to Rehab: Alzheimers, AMI, Anemia, Anxiety, Arrythmia, ASCHD, CP, COPD, CAD, delirium, dementia, GI bleed, HTN, HLD, MI, near syncope, OA, Rhabdomyolysis, UTI, weakness  Family/Caregiver Present: No  Prior to Session Communication: Bedside nurse  Patient Position Received: Bed, 3 rail up, Alarm on  General  Comment: 84 year old WF admitted from SNF with c/o diarrhea, weakness, and inability to walk.  Positive for Cdiff infection.   Precautions:  Hearing/Visual Limitations: Hearing WFL, no glasses  Medical Precautions: Fall precautions  Precautions Comment: instructed in use of call light     Date/Time Vitals Session Patient Position Pulse Resp SpO2 BP MAP (mmHg)    08/12/25 0726 --  --  69  16  99 %  118/52  68            Pain:  Pain Assessment  Pain Assessment: 0-10  0-10 (Numeric) Pain Score: 0 - No pain    Objective   Cognition:  Orientation Level: Disoriented to time, Disoriented to situation  Memory: Exceptions to WFL  Problem Solving: Exceptions to WFL  Safety/Judgement: Exceptions to WFL  Processing Speed: Delayed           Home Living:  Type of Home: Skilled Nursing facility  Home Adaptive Equipment: Wheelchair-manual  Home Layout: One level  Home Access: Level entry  Bathroom Shower/Tub: Walk-in shower  Bathroom Toilet: Handicapped height  Bathroom Equipment: None, Shower chair with back  Home Living Comments: Unable to provide any information regarding her living situation prior to being admitted to the SNF.  Above information is referring the environment at the SNF  Prior Function:  Level of [place]: Needs assistance with ADLs, Needs assistance with homemaking, Needs assistance with functional transfers  Receives Help From: Personal care attendant  ADL Assistance: Needs assistance  Homemaking Assistance: Needs assistance  Ambulatory Assistance: Needs assistance  Vocational: Retired  Hand Dominance: Right  Prior Function Comments: patient is non ambulatory and needs assist with ADLs at baseline  IADL History:     ADL:  Eating Assistance:  (set up)  Grooming Assistance: Minimal  Bathing Assistance: Moderate  UE Dressing Assistance: Moderate  LE Dressing Assistance: Total  Toileting Assistance with Device: Total  Functional Assistance: Total  Activities of Daily Living: Feeding  Feeding Level of  Assistance: Close supervision, Setup  Feeding Where Assessed: Bed level  Feeding Comments: has spillage due to positioning.  Repositioned in a chair to facilitate self feeding.  Activity Tolerance:  Endurance: Decreased tolerance for upright activites  Functional Standing Tolerance:     Bed Mobility/Transfers: Bed Mobility  Bed Mobility: Yes  Bed Mobility 1  Bed Mobility 1: Supine to sitting  Level of Assistance 1: Moderate assistance    Transfers  Transfer: Yes  Transfer 1  Transfer From 1: Bed to  Transfer to 1: Stand  Technique 1: Sit to stand  Transfer Level of Assistance 1: Maximum assistance  Transfers 2  Transfer From 2: Stand to  Transfer to 2: Chair with arms  Technique 2: Stand to sit  Transfer Level of Assistance 2: Maximum assistance    Sitting Balance:  Static Sitting Balance  Static Sitting-Balance Support: Feet supported  Static Sitting-Level of Assistance: Close supervision  Standing Balance:  Static Standing Balance  Static Standing-Balance Support: No upper extremity supported  Static Standing-Level of Assistance: Maximum assistance  Sensation:  Light Touch: No apparent deficits  Strength:  Strength Comments: DUANE 3/5     Coordination:  Movements are Fluid and Coordinated: No   Hand Function:  Hand Function  Gross Grasp: Functional  Coordination: Impaired        Outcome Measures: [facility] Daily Activity  Putting on and taking off regular lower body clothing: Total  Bathing (including washing, rinsing, drying): A lot  Putting on and taking off regular upper body clothing: A lot  Toileting, which includes using toilet, bedpan or urinal: Total  Taking care of personal grooming such as brushing teeth: A little  Eating Meals: A little  Daily Activity - Total Score: 12        Education Documentation  Precautions, taught by Starla Eduardo OT at 8/12/2025  9:22 AM.  Learner: Patient  Readiness: Acceptance  Method: Demonstration  Response: No Evidence of Learning, Needs Reinforcement  Comment: use of  call light               Goals:    Problem: Grooming  Goal: LTG - Patient will complete daily grooming tasks with set up and close supervision  Outcome: Progressing     Problem: OT Transfers  Goal: LTG - Patient will transfer from one surface to another with minimal assist and 2ww  Outcome: Progressing     Problem: Therapeutic Activity  Goal: OT Goal 1 patient will be oriented x 3 75% of time with maximum visual/environmental cues.   Outcome: Progressing

## 2025-08-12 NOTE — PROGRESS NOTES
"Riya Hernandez is a 84 y.o. female on day 4 of admission presenting with Diarrhea, unspecified type.    Subjective   Discharge to nh on oral atb       Objective     Physical Exam/better    Last Recorded Vitals  Blood pressure 118/52, pulse 69, temperature 36.6 °C (97.9 °F), temperature source Oral, resp. rate 16, height 1.575 m (5' 2\"), weight 63.5 kg (140 lb), SpO2 99%.  Intake/Output last 3 Shifts:  I/O last 3 completed shifts:  In: 600 (9.4 mL/kg) [P.O.:600]  Out: 1 (0 mL/kg) [Stool:1]  Weight: 63.5 kg     Relevant Results                            Assessment & Plan  Diarrhea, unspecified type  C diff colitis family ion the room SNF today        I spent  minutes in the professional and overall care of this patient.      Zuly Snyder MD    "

## 2025-08-12 NOTE — PROGRESS NOTES
Physical Therapy    Physical Therapy Evaluation & Treatment    Patient Name: Riya Hernandez  MRN: [id number]  Department: [address]  Room: [address] [place], [place] [address]  Today's Date: 8/12/2025   Time Calculation  Start Time: 1002  Stop Time: 1025  Time Calculation (min): 23 min    Assessment/Plan   PT Assessment  PT Assessment Results: Decreased strength, Impaired balance, Decreased mobility, Decreased coordination, Decreased cognition  Rehab Prognosis: Fair  Barriers to Discharge Home: Cognition needs, Physical needs  Cognition Needs: 24hr supervision for safety awareness needed, Cognition-related high falls risk  Physical Needs: 24hr mobility assistance needed, 24hr ADL assistance needed, Returning to long-term care/other facility  Evaluation/Treatment Tolerance: Patient tolerated treatment well  Medical Staff Made Aware: Yes  Barriers to Participation: Comorbidities  End of Session Communication: Bedside nurse  Assessment Comment: Return to LTC facility with skilled therapy services to improve mobility.  End of Session Patient Position: Bed, 3 rail up, Alarm on (call button inreach;)  IP OR SWING BED PT PLAN  Inpatient or Swing Bed: Inpatient  PT Plan  Treatment/Interventions: Bed mobility, Transfer training, Balance training, Strengthening, Therapeutic exercise  PT Plan: Ongoing PT  PT Frequency: 3 times per week (during this acute inpatient hospitalization)  PT Discharge Recommendations: Moderate intensity level of continued care (· Based on current functional status and rehab potential, patient is anticipated to tolerate and benefit from 5 or more days per week of skilled rehabilitative therapy after discharge from this acute inpatient hospitalization.)  Equipment Recommended upon Discharge: Wheeled walker  PT Recommended Transfer Status: Assist x2  PT - OK to Discharge: Yes    Subjective     PT Visit Info:  PT Received On: 08/12/25  General Visit Information:  General  Reason for Referral: pt is a 83 y/o female admitted with  diarrhea; pt found to have C-diff; impaired mobility  Referred By: Zuly Snyder MD  Past Medical History Relevant to Rehab: Alzheimers, AMI, Anemia, Anxiety, Arrythmia, ASCHD, CP, COPD, CAD, delirium, dementia, GI bleed, HTN, HLD, MI, near syncope, OA, Rhabdomyolysis, UTI, weakness; falls; diverticulosis; spinal stenosis; L4 compression deformity  Family/Caregiver Present: Yes  Caregiver Feedback: pts daughter present and able to provide pt history  Prior to Session Communication: Bedside nurse  Patient Position Received: Up in chair, Alarm on  General Comment: pt pleasant and cooperative; pt able to follow simple commands      Home Living:  Home Living  Type of Home: Long Term Care facility  Lives With:  (care staff)  Home Adaptive Equipment: Wheelchair-manual  Home Layout: One level  Home Access: Ramped entrance  Bathroom Shower/Tub: Walk-in shower  Bathroom Toilet: Handicapped height  Bathroom Equipment: None, Shower chair with back  Home Living Comments: per pts daughter - pt has been a LTC resident at [facility] for ~ 1 year and was getting intermittent therapy      Prior Level of Function:      Prior Function Per Pt/Caregiver Report  Level of [place]: Needs assistance with ADLs, Needs assistance with homemaking, Needs assistance with functional transfers  Receives Help From: Personal care attendant  ADL Assistance: Needs assistance  Homemaking Assistance: Needs assistance  Ambulatory Assistance: Needs assistance (pt has been non-ambulatory for months; per daughter pt has a fear of falling)  Vocational: Retired  Prior Function Comments: pt is non-ambulaotry. pt required 2 person assistance for ADL's, transfers and up in w/c.    Precautions:  Precautions  Hearing/Visual Limitations: Hearing WFL, wears glasses  Medical Precautions: Fall precautions  Precautions Comment: instructed in use of call light             Objective   Pain:  Pain Assessment  Pain Assessment:   (0/10)    Cognition:  Cognition  Overall Cognitive Status: Impaired at baseline  Orientation Level: Disoriented to time, Disoriented to situation  Safety/Judgement:  (impaired safety awareness)  Insight: Severe  Processing Speed: Delayed    General Assessments:          Activity Tolerance  Endurance:  (FAIR+ activity tolerance)    Sensation  Sensation Comment: denies any  numbness/tingling       Coordination  Coordination Comment: difficulty advancing B LE    Postural Control  Posture Comment: mild kyphosis    Static Sitting Balance  Static Sitting-Comment/Number of Minutes: GOOD-  Dynamic Sitting Balance  Dynamic Sitting-Comments: GOOD-    Static Standing Balance  Static Standing-Comment/Number of Minutes: FAIR-  Dynamic Standing Balance  Dynamic Standing-Comments: POOR      Treatment:     Bed Mobility  Bed Mobility:  (sit to supine with MAX A x 2 for trunk and B LE. pt performed rolling side to side with MOD A. PCA present to perform rowena-care and don clean pads. scooted pt toward HOB with MAX A x 2. placed bed into chair position for comfort)        Transfers  Transfer:  (sit <-> stand x 2 trials with MAX A x 2. on 1st trial attempted to stand using RW. pt very resistive with posterior lean. unable to partially stand. on 2nd trial performed SPT from chair to EOB. pt was incontinent of stool.)    Ambulation/Gait Training  Ambulation/Gait Training Performed:  (N/A)       Extremity/Trunk Assessments:  RUE   RUE :  (WFL with grossly  3/5 strength)  LUE   LUE:  (WFL with grossly 3/5 strength)  RLE   RLE :  (WFL with grossly 3/5 strength; mild swelling noted)  LLE   LLE :  (WFL with grossly 3/5 strength; mild swelling noted.)      Outcome Measures:  [facility] Basic Mobility  Turning from your back to your side while in a flat bed without using bedrails: Total  Moving from lying on your back to sitting on the side of a flat bed without using bedrails: Total  Moving to and from bed to chair (including a wheelchair):  Total  Standing up from a chair using your arms (e.g. wheelchair or bedside chair): Total  To walk in hospital room: Total  Climbing 3-5 steps with railing: Total  Basic Mobility - Total Score: 6      Encounter Problems       Encounter Problems (Active)       PT Transfers       STG - Transfer from bed to chair via SPT with MOD A (Progressing)       Start:  08/12/25    Expected End:  08/26/25            STG - Patient to transfer to and from sit to supine with MOD A (Progressing)       Start:  08/12/25    Expected End:  08/26/25            STG - Patient will transfer sit to and from stand with MOD A (Progressing)       Start:  08/12/25    Expected End:  08/26/25                   Education Documentation  Precautions, taught by Stef Patrick, PT at 8/12/2025 11:50 AM.  Learner: Patient  Readiness: Eager  Method: Explanation  Response: Needs Reinforcement  Comment:     Body Mechanics, taught by Stef Patrick, PT at 8/12/2025 11:50 AM.  Learner: Patient  Readiness: Eager  Method: Explanation  Response: Needs Reinforcement  Comment:     Mobility Training, taught by Stef Patrick, PT at 8/12/2025 11:50 AM.  Learner: Patient  Readiness: Eager  Method: Explanation  Response: Needs Reinforcement  Comment:     Education Comments  No comments found.

## 2025-08-12 NOTE — PROGRESS NOTES
"Neurology Follow Up    Subjective:  Ms. Hernandez is a 84 y.o. female admitted 8/7/2025, LOS 4. Neurology is re-consulted for \"stool and urine incontinence, can t walk\".       Brief HPI per previous consult on 8/10/2025:  \"PMH of Alzheimer's disease who presented to [facility] on 8/7/2025 with diarrhea, neurology consulted for bowel/bladder incontinence.     Pt with significant memory issues, unable to provider hx. She is in good spirit, denies any pain, HA, vision changes, weakness.   Per son, pt came in due to diarrhea. Pt has been living with her daughter, now moved into a nursing home. Has behavioral / agitation issues there. Pt has not walked for several months now; her gait has been progressively worsening, was needing a walker, but still had falls. Now non-ambulatory. Requires lots of care at facility.      Came in for bad diarrhea. This has been improving.      CT lumbar spine was done which was personally reviewed -- noted degenerative changes with spinal canal stenosis, likely the underlying cause of pt's BLE weakness / gait impairment.    consulted for bowel/bladder incontinence. Pt has been nonambulatory for several months. CT L spine shows advanced degenerative changes with spinal canal stenosis. Pt is a poor surgical candidate.      Diagnoses:  Lumbar spinal degenerative disease  Alzheimer's disease   No further acute neurological evaluation necessary\"       Objective:  Blood pressure 118/52, pulse 69, temperature 36.6 °C (97.9 °F), temperature source Oral, resp. rate 16, height 1.575 m (5' 2\"), weight 63.5 kg (140 lb), SpO2 99%.    Physical Exam:  Neurological Exam:  Oriented to [facility], but not year or month or season, does not know why she is in the hospital  Follows commands, no aphasia/dysarthria.  PERRL, VFF,  EOMI, normal saccades and pursuit, no gaze preference/nystagmus.   Intact facial sensation, no asymmetry. Intact hearing bilaterally. Tongue midline. Symmetric palate elevation.   Motor: " 5/5 BUE tested proximally and distally. BLE 2/5 at the hips, right foot drop including inversion and eversion, sparing plantarflexion.  Left lower extremity spared.  Sensation: intact to light touch throughout the legs   Cerebellar: intact finger to nose. BLE limited mobility.   Gait: deferred -- pt non-ambulatory       Reviewed relevant results, independent review/interpretation of imaging:   CT L Spine: 1. Diffuse degenerative changes throughout the lumbar spine, worse at  L3-L4 and L4-L5 where there is severe spinal canal stenosis. No  significant foraminal stenosis.  2. Indeterminate age compression fracture of L4 with approximately  30% loss of vertebral body height.  3. Findings consistent with osteoporosis; this can be confirmed with  DEXA scan.        Assessment:   Ms. Hernandez is a 84 y.o. female with significant baseline disability including Alzheimer's dementia.  She is recovering from diarrhea and C. difficile infection.  We reiterated the history that she has been increasingly immobile over the last several months well and her facility.  She has not walked independently in several months.  This gait disorder is likely multifactorial including her underlying neurodegenerative condition, lumbosacral degenerative disease, and right dropfoot, likely secondary to lumbar sacral radiculopathy.  Given her advanced neurocognitive disease, interventions are likely of minimal benefit.  I discussed the possibility of further evaluation with the patient and her daughter at bedside.  Given her overall declining condition, there was not interested in further evaluation of lumbosacral spinal disease.  Instead focus on safety and movement and transfers with assistance should be pursued.  Assistive devices including ankle braces if attempting to walk with assistance, walkers to be used.  If walking deemed unsafe or as patient's condition continues to decline in the future wheelchair can be provided as well.      Neurology  will sign off      I spent 35 minutes of total professional time in the care of this patient.     Carlito Huerta MD

## 2025-08-12 NOTE — NURSING NOTE
Assumed care of patient.   Patient in bed asleep.  Heart monitor showing SR with HR of 69.  Call light in reach.

## 2025-08-12 NOTE — PROGRESS NOTES
Patient with an active discharge. Patient will return to [facility] as a long term resident. Transport set up for 2PM. Nurse to call report. Paperwork sent to facility. Will follow as needed.      08/12/25 1251   Discharge Planning   Home or Post Acute Services Post acute facilities (Rehab/SNF/etc)   Type of Post Acute Facility Services Long term care   Expected Discharge Disposition Inter  ([facility])   Does the patient need discharge transport arranged? Yes   [facility] coordination needed? Yes

## 2025-08-12 NOTE — PROGRESS NOTES
"Nutrition Follow up Note    Nutrition Assessment      Patient remains DNR, comfort measures only. Discharge planning back to LTC pending clearance from ID. PO intake remains fair. Mentation improving. Patient able to feed self at this time. Will continue to follow and monitor nutrition needs.    Nutrition History:  Energy Intake: Fair 50-75 %     Anthropometrics:  Ht: 157.5 cm (5' 2\"), Wt: 63.5 kg (140 lb), BMI: 25.6  IBW/kg (Dietitian Calculated): 50 kg  Percent of IBW: 127 %     Weight Change:  Daily Weight  08/07/25 : 63.5 kg (140 lb)  09/24/24 : 63.5 kg (140 lb)  07/05/24 : 64.1 kg (141 lb 6.4 oz)  12/19/23 : 62.6 kg (138 lb)  10/10/23 : 67.1 kg (148 lb)  04/12/23 : 64.9 kg (143 lb)     Nutrition Focused Physical Exam Findings:   Subcutaneous Fat Loss  Defer Subcutaneous Fat Loss Assessment: Defer all  Defer All Reason: remote assessment    Muscle Wasting  Defer Muscle Wasting Assessment: Defer all  Defer All Reason: remote assessment    Edema  Edema: none    Physical Findings  Skin: Negative  Digestive System Findings: Diarrhea    Nutrition Significant Labs:  Lab Results   Component Value Date    WBC 10.9 08/11/2025    HGB 14.0 08/11/2025    HCT 45.1 08/11/2025     08/11/2025    ALT 9 08/11/2025    AST 20 08/11/2025     08/11/2025    K 4.4 08/11/2025     (H) 08/11/2025    CREATININE 0.98 08/11/2025    BUN 15 08/11/2025    CO2 26 08/11/2025    INR 1.0 09/25/2024     Nutrition Specific Medications:  Scheduled Medications[1]  Continuous Medications[2]    Dietary Orders (From admission, onward)       Start     Ordered    08/10/25 0904  Oral nutritional supplements  Until discontinued        Question Answer Comment   Deliver with Lunch    Deliver with Dinner    Deliver with Breakfast    Select supplement: Ensure Plus High Protein        08/10/25 0903    08/08/25 1611  Adult diet Regular; Soft and bite sized 6  Diet effective now        Question Answer Comment   Diet type Regular    Texture Soft " and bite sized 6        08/08/25 1610    08/08/25 0627  May Participate in Room Service  ( ROOM SERVICE MAY PARTICIPATE)  Once        Question:  .  Answer:  Yes    08/08/25 0626                   Estimated Needs:   Estimated Energy Needs  Total Energy Estimated Needs in 24 hours (kCal): 1500 kCal  Method for Estimating Needs: 30 kcal/kg IBW    Estimated Protein Needs  Total Protein Estimated Needs in 24 Hours (g): 60 g  Method for Estimating 24 Hour Protein Needs: 1.2 g/kg IBW    Estimated Fluid Needs  Total Fluid Estimated Needs in 24 Hours (mL): 1500 mL  Method for Estimating 24 Hour Fluid Needs: 1 ml/kcal or per provider       Nutrition Diagnosis   Nutrition Diagnosis:  Malnutrition Diagnosis  Patient has Malnutrition Diagnosis: No    Nutrition Diagnosis  Patient has Nutrition Diagnosis: Yes  Diagnosis Status (1): New  Nutrition Diagnosis 1: Inadequate oral intake  Related to (1): decreased ability to consume/tolerate sufficient energy  As Evidenced by (1): low po intake     Nutrition Interventions/Recommendations   Nutrition Interventions and Recommendations:  Nutrition Prescription: Nutrition prescription for oral nutrition    Nutrition Recommendations:  Individualized Nutrition Prescription Provided for : provide per SLP recs    Nutrition Interventions/Goals:   Food and/or Nutrient Delivery Interventions  Interventions: Meals and snacks, Medical food supplement  Meals and Snacks: Texture-modified diet  Goal: provide as ordered  Medical Food Supplement: Commercial beverage medical food supplement therapy  Goal: ensure plus high protein TID to provide 350 kcals and 20g protein each    Education Documentation  No documentation found.            Nutrition Monitoring and Evaluation   Monitoring/Evaluation:   Food/Nutrient Related History Monitoring  Monitoring and Evaluation Plan: Estimated Energy Intake  Estimated Energy Intake: Energy intake 50 -75% of estimated energy needs  Intake / Amount of food: Consumes  at least 50% or more of meals/snacks/supplements    Anthropometric Measurements  Monitoring and Evaluation Plan: Body weight  Body Weight: Body weight - Maintain stable weight    Goal Status: Some progress toward goal(s)    Follow Up  Time Spent (min): 30 minutes  Last Date of Nutrition Visit: 08/12/25  Nutrition Follow-Up Needed?: 5-7 days  Follow up Comment: 8/19/25          [1] atorvastatin, 80 mg, oral, Nightly  donepezil, 10 mg, oral, Nightly  memantine, 5 mg, oral, Daily  metoprolol succinate XL, 25 mg, oral, Daily  metroNIDAZOLE, 500 mg, oral, q8h OTONIEL  pantoprazole, 40 mg, oral, Daily before breakfast     [2]

## 2025-08-16 NOTE — DISCHARGE SUMMARY
Discharge Diagnosis  Diarrhea, unspecified type           Issues Requiring Follow-Up  diarrhea    Discharge Meds     Medication List      START taking these medications     metroNIDAZOLE 500 mg tablet; Commonly known as: Flagyl; Take 1 tablet   (500 mg) by mouth every 8 hours for 14 days.     CHANGE how you take these medications     acetaminophen 325 mg tablet; Commonly known as: Tylenol; Take 2 tablets   (650 mg) by mouth every 4 hours if needed for mild pain (1 - 3).; What   changed: Another medication with the same name was removed. Continue   taking this medication, and follow the directions you see here.     CONTINUE taking these medications     albuterol 1.25 mg/3 mL nebulizer solution   atorvastatin 80 mg tablet; Commonly known as: Lipitor   benzocaine-menthol lozenge; Commonly known as: Cepastat Sore Throat;   Dissolve 1 lozenge in the mouth every 2 hours if needed for sore throat.   dextromethorphan-guaifenesin  mg/5 mL oral liquid; Commonly known   as: Robitussin DM; Take 5 mL by mouth every 4 hours if needed for cough.   donepezil 10 mg tablet; Commonly known as: Aricept   hydrOXYzine pamoate 25 mg capsule; Commonly known as: Vistaril; Take 1   capsule (25 mg) by mouth every 8 hours if needed for itching.   melatonin 3 mg tablet; Take 1 tablet (3 mg) by mouth as needed at   bedtime for sleep.   memantine 5 mg tablet; Commonly known as: Namenda   metoprolol succinate XL 25 mg 24 hr tablet; Commonly known as: Toprol-XL   ondansetron ODT 4 mg disintegrating tablet; Commonly known as:   Zofran-ODT; Take 1 tablet (4 mg) by mouth every 8 hours if needed for   nausea.   pantoprazole 40 mg EC tablet; Commonly known as: ProtoNix; Take 1 tablet   (40 mg) by mouth once daily in the morning. Take before meals. Do not   crush, chew, or split.     STOP taking these medications     ondansetron 4 mg/2 mL injection; Commonly known as: Zofran       Test Results Pending At Discharge  Pending Labs       Order Current  Status    Extra Urine Gray Tube In process    Urinalysis with Reflex Culture and Microscopic In process    Extra Tubes Preliminary result    Extra Tubes Preliminary result    Lavender Top Preliminary result    Lavender Top Preliminary result            Hospital Course   Improoved,discharge to rehab    Pertinent Physical Exam At Time of Discharge  Physical Exam    Outpatient Follow-Up  No future appointments.      Zuly Snyder MD

## 2025-08-25 VITALS
HEART RATE: 80 BPM | SYSTOLIC BLOOD PRESSURE: 126 MMHG | TEMPERATURE: 98 F | RESPIRATION RATE: 18 BRPM | DIASTOLIC BLOOD PRESSURE: 82 MMHG

## 2025-08-25 ASSESSMENT — ENCOUNTER SYMPTOMS
CHILLS: 0
FEVER: 0